# Patient Record
Sex: FEMALE | Race: WHITE | NOT HISPANIC OR LATINO | Employment: FULL TIME | URBAN - METROPOLITAN AREA
[De-identification: names, ages, dates, MRNs, and addresses within clinical notes are randomized per-mention and may not be internally consistent; named-entity substitution may affect disease eponyms.]

---

## 2019-06-18 ENCOUNTER — OFFICE VISIT (OUTPATIENT)
Dept: OBGYN CLINIC | Facility: MEDICAL CENTER | Age: 59
End: 2019-06-18
Payer: COMMERCIAL

## 2019-06-18 ENCOUNTER — TELEPHONE (OUTPATIENT)
Dept: OBGYN CLINIC | Facility: MEDICAL CENTER | Age: 59
End: 2019-06-18

## 2019-06-18 VITALS
SYSTOLIC BLOOD PRESSURE: 112 MMHG | HEIGHT: 62 IN | BODY MASS INDEX: 25.83 KG/M2 | WEIGHT: 140.38 LBS | DIASTOLIC BLOOD PRESSURE: 72 MMHG

## 2019-06-18 DIAGNOSIS — N95.0 POSTMENOPAUSAL BLEEDING: Primary | ICD-10-CM

## 2019-06-18 PROCEDURE — 58100 BIOPSY OF UTERUS LINING: CPT | Performed by: PHYSICIAN ASSISTANT

## 2019-06-18 PROCEDURE — 99202 OFFICE O/P NEW SF 15 MIN: CPT | Performed by: PHYSICIAN ASSISTANT

## 2019-06-18 RX ORDER — ATENOLOL 25 MG/1
25 TABLET ORAL DAILY
Refills: 1 | COMMUNITY
Start: 2019-05-01 | End: 2022-05-24 | Stop reason: SDUPTHER

## 2019-06-18 RX ORDER — MISOPROSTOL 200 UG/1
TABLET ORAL
Qty: 4 TABLET | Refills: 0 | Status: SHIPPED | OUTPATIENT
Start: 2019-06-18 | End: 2022-05-24

## 2019-06-18 RX ORDER — CITALOPRAM 10 MG/1
10 TABLET ORAL DAILY
Refills: 0 | COMMUNITY
Start: 2019-03-13 | End: 2022-05-24 | Stop reason: SDUPTHER

## 2019-06-18 RX ORDER — LORATADINE 10 MG/1
10 TABLET ORAL DAILY
COMMUNITY

## 2019-06-24 ENCOUNTER — TELEPHONE (OUTPATIENT)
Dept: OBGYN CLINIC | Facility: CLINIC | Age: 59
End: 2019-06-24

## 2019-06-24 DIAGNOSIS — N95.0 POSTMENOPAUSAL BLEEDING: ICD-10-CM

## 2019-06-24 RX ORDER — MISOPROSTOL 200 UG/1
TABLET ORAL
Qty: 4 TABLET | Refills: 0 | Status: CANCELLED | OUTPATIENT
Start: 2019-06-24

## 2019-06-26 ENCOUNTER — HOSPITAL ENCOUNTER (OUTPATIENT)
Dept: RADIOLOGY | Facility: MEDICAL CENTER | Age: 59
Discharge: HOME | End: 2019-06-26
Payer: COMMERCIAL

## 2019-06-26 ENCOUNTER — PROCEDURE VISIT (OUTPATIENT)
Dept: OBGYN CLINIC | Facility: CLINIC | Age: 59
End: 2019-06-26
Payer: COMMERCIAL

## 2019-06-26 VITALS — WEIGHT: 140.4 LBS | BODY MASS INDEX: 25.68 KG/M2 | DIASTOLIC BLOOD PRESSURE: 82 MMHG | SYSTOLIC BLOOD PRESSURE: 118 MMHG

## 2019-06-26 DIAGNOSIS — N95.0 POSTMENOPAUSAL BLEEDING: Primary | ICD-10-CM

## 2019-06-26 DIAGNOSIS — N95.0 POSTMENOPAUSAL BLEEDING: ICD-10-CM

## 2019-06-26 PROCEDURE — 76856 US EXAM PELVIC COMPLETE: CPT

## 2019-06-26 PROCEDURE — 58100 BIOPSY OF UTERUS LINING: CPT | Performed by: PHYSICIAN ASSISTANT

## 2019-06-26 PROCEDURE — 76830 TRANSVAGINAL US NON-OB: CPT

## 2019-06-28 ENCOUNTER — TELEPHONE (OUTPATIENT)
Dept: OBGYN CLINIC | Facility: CLINIC | Age: 59
End: 2019-06-28

## 2019-06-28 LAB
CLINICAL INFO: NORMAL
PATH REPORT.COMMENTS IMP SPEC: NORMAL
PATH REPORT.FINAL DX SPEC: NORMAL
PROCEDURE TYPE: NORMAL
SPECIMEN SOURCE: NORMAL

## 2019-06-28 NOTE — TELEPHONE ENCOUNTER
Dear Gilberto Albert:    Pt called office today to request recent pelvic ultrasound result (pelvic ultrasound completed on 6/26/19)  Pt informed that medical staff will notify Pt of said result upon receipt and review of result by provider  Please advise  Thank you!     Katie Loya MA

## 2019-06-28 NOTE — TELEPHONE ENCOUNTER
Attempted to leave voicemail message today, however, unable to leave message due to Pt's answering service stating Pt's  voicemail mailbox is full at this time

## 2019-07-03 ENCOUNTER — OFFICE VISIT (OUTPATIENT)
Dept: DERMATOLOGY | Facility: CLINIC | Age: 59
End: 2019-07-03
Payer: COMMERCIAL

## 2019-07-03 DIAGNOSIS — L81.9 DYSCHROMIA: Primary | ICD-10-CM

## 2019-07-03 DIAGNOSIS — Z13.89 SCREENING FOR SKIN CONDITION: ICD-10-CM

## 2019-07-03 DIAGNOSIS — D22.9 NEVUS: ICD-10-CM

## 2019-07-03 PROCEDURE — 99203 OFFICE O/P NEW LOW 30 MIN: CPT | Performed by: DERMATOLOGY

## 2019-07-03 NOTE — PROGRESS NOTES
[address]  [address] [facility] [address]  [phone]  [phone]     MRN: [id number] : 1960  Encounter: [id number]  Patient Information: Kamran Askew  Chief complaint:  Skin cancer checkup    History of present illness:  70-year-old female who likes to tan presents for overall skin check for concerns regarding skin cancer patient cyst as has squamous cell cancer no specific concerns nose except some darkened area on the chest and face  Past Medical History:   Diagnosis Date    Anxiety     Arthritis     Fibroids     GERD (gastroesophageal reflux disease)     Heart disease     Varicella      Past Surgical History:   Procedure Laterality Date    CARDIAC CATHETERIZATION      DILATION AND EVACUATION      TONSILECTOMY AND ADNOIDECTOMY      TUBAL LIGATION       Social History   Social History     Substance and Sexual Activity   Alcohol Use Yes    Frequency: Monthly or less    Comment: socially      Social History     Substance and Sexual Activity   Drug Use Never     Social History     Tobacco Use   Smoking Status Never Smoker   Smokeless Tobacco Never Used     Family History   Problem Relation Age of Onset    Breast cancer Family     Colon cancer Family     Breast cancer Mother     Colon cancer Father     Ovarian cancer Neg Hx      Meds/Allergies   No Known Allergies    Meds:  Prior to Admission medications    Medication Sig Start Date End Date Taking?  Authorizing Provider   atenolol (TENORMIN) 25 mg tablet Take 25 mg by mouth daily 19  Yes Historical Provider, MD   citalopram (CeleXA) 10 mg tablet Take 10 mg by mouth daily 3/13/19  Yes Historical Provider, MD   loratadine (CLARITIN) 10 mg tablet Take 10 mg by mouth daily   Yes Historical Provider, MD   misoprostol (CYTOTEC) 200 mcg tablet 2 tabs VAGINALLY evening prior to procedure, 2 tabs VAGINALLY morning of procedure  Patient not taking: Reported on 7/3/2019 6/18/19 Chuy Blackwood PA-C       Subjective:     Review of Systems:    General: negative for - chills, fatigue, fever,  weight gain or weight loss  Psychological: negative for - anxiety, behavioral disorder, concentration difficulties, decreased libido, depression, irritability, memory difficulties, mood swings, sleep disturbances or suicidal ideation  ENT: negative for - hearing difficulties , nasal congestion, nasal discharge, oral lesions, sinus pain, sneezing, sore throat  Allergy and Immunology: negative for - hives, insect bite sensitivity,  Hematological and Lymphatic: negative for - bleeding problems, blood clots,bruising, swollen lymph nodes  Endocrine: negative for - hair pattern changes, hot flashes, malaise/lethargy, mood swings, palpitations, polydipsia/polyuria, skin changes, temperature intolerance or unexpected weight change  Respiratory: negative for - cough, hemoptysis, orthopnea, shortness of breath, or wheezing  Cardiovascular: negative for - chest pain, dyspnea on exertion, edema,  Gastrointestinal: negative for - abdominal pain, nausea/vomiting  Genito-Urinary: negative for - dysuria, incontinence, irregular/heavy menses or urinary frequency/urgency  Musculoskeletal: negative for - gait disturbance, joint pain, joint stiffness, joint swelling, muscle pain, muscular weakness  Dermatological:  As in HPI  Neurological: negative for confusion, dizziness, headaches, impaired coordination/balance, memory loss, numbness/tingling, seizures, speech problems, tremors or weakness       Objective: There were no vitals taken for this visit      Physical Exam:    General Appearance:    Alert, cooperative, no distress   Head:    Normocephalic, without obvious abnormality, atraumatic           Skin:   A full skin exam was performed including scalp, head scalp, eyes, ears, nose, lips, neck, chest, axilla, abdomen, back, buttocks, bilateral upper extremities, bilateral lower extremities, hands, feet, fingers, toes, fingernails, and toenails normal pigmented lesion regular shape and color some hyperpigmentation noted on the chest wall and face nothing else remarkable on exam     Assessment:     1  Dyschromia     2  Screening for skin condition     3  Nevus           Plan:   Dyschromia question melasma results of chronic sun exposure no real problems noted  Nevi reviewed the concept of ABCDE and ugly duckling nothing markedly atypical patient reassured  Screening for Dermatologic Disorders: Nothing else of concern noted on complete exam follow up in 1 year       Liana Gitelman, MD  7/3/2019,8:45 AM    Portions of the record may have been created with voice recognition software   Occasional wrong word or "sound a like" substitutions may have occurred due to the inherent limitations of voice recognition software   Read the chart carefully and recognize, using context, where substitutions have occurred

## 2019-07-03 NOTE — PATIENT INSTRUCTIONS
Dyschromia question melasma results of chronic sun exposure no real problems noted  Nevi reviewed the concept of ABCDE and ugly duckling nothing markedly atypical patient reassured  Screening for Dermatologic Disorders: Nothing else of concern noted on complete exam follow up in 1 year

## 2019-07-05 ENCOUNTER — TELEPHONE (OUTPATIENT)
Dept: OBGYN CLINIC | Facility: CLINIC | Age: 59
End: 2019-07-05

## 2019-07-05 NOTE — TELEPHONE ENCOUNTER
Pt called very upset  Pt states that she received a call from [employer] on Monday 7/1 with u/s and biopsy results  Luz Maria Cook explained that everything was normal  Pt states izabel then called on Wednesday 7/3 and left a message for pt to call back  Pt then called back within 15 minutes and we explained that she is with a patient and she will return her call  Pt never received a call back from [employer] and we were closed yesterday 7/4  Pt is leaving for [place] on Monday and would like a call back asap pt is very worried something is wrong   Pt assumed everything was fine from the call with izabel on Monday 7/1 please advise

## 2019-07-05 NOTE — TELEPHONE ENCOUNTER
Spoke with pt    expl I saw nothing new on her results from what izabel discussed with her  She is insisting on  a call on 07/08 early am to confirm from [facility]     Leaving for [place]

## 2019-07-08 NOTE — TELEPHONE ENCOUNTER
Reviewed results with patient, tissue was normal but scant and paired with US, rec consult with physician and possible D&C patient aware and scheduling appointment  Did not seem upset

## 2019-08-15 ENCOUNTER — OFFICE VISIT (OUTPATIENT)
Dept: OBGYN CLINIC | Facility: MEDICAL CENTER | Age: 59
End: 2019-08-15
Payer: COMMERCIAL

## 2019-08-15 VITALS
WEIGHT: 141.8 LBS | SYSTOLIC BLOOD PRESSURE: 118 MMHG | DIASTOLIC BLOOD PRESSURE: 78 MMHG | RESPIRATION RATE: 14 BRPM | BODY MASS INDEX: 26.09 KG/M2 | HEIGHT: 62 IN

## 2019-08-15 DIAGNOSIS — N95.0 PMB (POSTMENOPAUSAL BLEEDING): Primary | ICD-10-CM

## 2019-08-15 DIAGNOSIS — Z01.419 ENCOUNTER FOR ANNUAL ROUTINE GYNECOLOGICAL EXAMINATION: ICD-10-CM

## 2019-08-15 PROCEDURE — 87624 HPV HI-RISK TYP POOLED RSLT: CPT | Performed by: OBSTETRICS & GYNECOLOGY

## 2019-08-15 PROCEDURE — G0145 SCR C/V CYTO,THINLAYER,RESCR: HCPCS | Performed by: OBSTETRICS & GYNECOLOGY

## 2019-08-15 PROCEDURE — 99214 OFFICE O/P EST MOD 30 MIN: CPT | Performed by: OBSTETRICS & GYNECOLOGY

## 2019-08-15 RX ORDER — ALPRAZOLAM 0.25 MG/1
0.25 TABLET ORAL DAILY PRN
Refills: 0 | COMMUNITY
Start: 2019-07-03

## 2019-08-15 NOTE — PROGRESS NOTES
Assessment/Plan:      Diagnoses and all orders for this visit:    PMB (postmenopausal bleeding)    Encounter for annual routine gynecological examination  -     Liquid-based pap, screening    Other orders  -     ALPRAZolam (XANAX) 0 25 mg tablet; Take 0 25 mg by mouth daily as needed        We had a long discussion regarding the ability to properly evaluate the uterus after ablation  Postmenopausal bleeding often results and recommendation for hysterectomy in the setting of endometrial ablation  On the side of being benign was the fact that she had minimal bleeding for 1 day and her endometrium was properly evaluated before the ablation with an endometrial biopsy and D&C; both specimens showed no Hyperplasia or atypia  The biopsy did show disordered proliferative endometrium (Per Dr Garg Beverage note, I was not able to view a copy path report) This may be interpreted as simple hyperplasia without atypia  The rate of progression to endometrial cancer would be expected to be low  We discussed observation, D&C hysteroscopy, hysterectomy  I recommended D&C hysteroscopy despite its limited diagnostic value  I offered a 2nd opinion from GYN/ONC  I spent greater than 30 minutes with the patient greater than 50% of the time was spent counseling and coordinating care  Subjective:     Patient ID: Braxton Hu is a 62 y o  female  Patient is a 80-year-old female, para 2, endometrial ablation 4 years ago, presents for consultation for D&C  Patient had 1 day of very light pink spotting in June of 2019  Patient has had no postmenopausal bleeding since her ablation 4 years ago  Prior to her ablation she had an endometrial biopsy and a hysteroscopy D&C  The pathology report from her D&C in 2015 is available on Care everywhere and it shows proliferative endometrium with no evidence of hyperplasia or atypia  Patient denies further bleeding since that one day of spotting in June     Patient denies pelvic or abdominal pain    Past medical history significant for anxiety irregular heartbeat and mitral valve prolapse  Current medications are citalopram and to [facility] Nazario  Past surgical history significant for tubal ligation cardiac catheterization and T&A  No known drug allergies        Review of Systems      Objective:     Physical Exam

## 2019-08-17 LAB
HPV HR 12 DNA CVX QL NAA+PROBE: NEGATIVE
HPV16 DNA CVX QL NAA+PROBE: NEGATIVE
HPV18 DNA CVX QL NAA+PROBE: NEGATIVE

## 2019-08-21 LAB
LAB AP GYN PRIMARY INTERPRETATION: NORMAL
Lab: NORMAL

## 2020-01-02 ENCOUNTER — OFFICE VISIT (OUTPATIENT)
Dept: URGENT CARE | Facility: MEDICAL CENTER | Age: 60
End: 2020-01-02
Payer: COMMERCIAL

## 2020-01-02 VITALS
TEMPERATURE: 97.6 F | OXYGEN SATURATION: 98 % | RESPIRATION RATE: 20 BRPM | HEART RATE: 52 BPM | DIASTOLIC BLOOD PRESSURE: 88 MMHG | SYSTOLIC BLOOD PRESSURE: 157 MMHG

## 2020-01-02 DIAGNOSIS — H65.02 ACUTE SEROUS OTITIS MEDIA OF LEFT EAR, RECURRENCE NOT SPECIFIED: Primary | ICD-10-CM

## 2020-01-02 PROCEDURE — G0382 LEV 3 HOSP TYPE B ED VISIT: HCPCS | Performed by: PHYSICIAN ASSISTANT

## 2020-01-02 PROCEDURE — S9083 URGENT CARE CENTER GLOBAL: HCPCS | Performed by: PHYSICIAN ASSISTANT

## 2020-01-02 RX ORDER — AMOXICILLIN 500 MG/1
500 TABLET, FILM COATED ORAL 3 TIMES DAILY
Qty: 30 TABLET | Refills: 0 | Status: SHIPPED | OUTPATIENT
Start: 2020-01-02 | End: 2020-01-12

## 2020-01-02 NOTE — PATIENT INSTRUCTIONS
1  Take Amox 500mg 3x daily x 10 days  2  Motrin as needed for ear pain  3   Follow up with PCP in 3-5 days if symptoms persist

## 2020-01-02 NOTE — PROGRESS NOTES
[facility]        NAME: Lucero Miranda is a 61 y o  female  : 1960    MRN: [id number]  DATE: 2020  TIME: 1:20 PM    Assessment and Plan   Acute serous otitis media of left ear, recurrence not specified [H65 02]  1  Acute serous otitis media of left ear, recurrence not specified  amoxicillin (AMOXIL) 500 MG tablet         Patient Instructions     1  Take Amox 500mg 3x daily x 10 days  2  Motrin as needed for ear pain  3  Follow up with PCP in 3-5 days if symptoms persist       Chief Complaint     Chief Complaint   Patient presents with    Earache     x1 week feels movement in left ear, had veritgo a few days ago   feels pain in jaw on left side  History of Present Illness       Bobo Handler is a 49-year-old female presents with the increasing left-sided ear pain that developed over the past 2 days  Patient reports sensation of Tonna Aly in her ear over the last 6 days, she reports increasing left ear and jaw pain that developed over the past 24 hours  Patient denies any new fever or ear drainage  Review of Systems   Review of Systems   Constitutional: Negative  HENT: Positive for ear pain  Negative for ear discharge  Respiratory: Negative            Current Medications       Current Outpatient Medications:     ALPRAZolam (XANAX) 0 25 mg tablet, Take 0 25 mg by mouth daily as needed, Disp: , Rfl: 0    atenolol (TENORMIN) 25 mg tablet, Take 25 mg by mouth daily, Disp: , Rfl: 1    citalopram (CeleXA) 10 mg tablet, Take 10 mg by mouth daily, Disp: , Rfl: 0    loratadine (CLARITIN) 10 mg tablet, Take 10 mg by mouth daily, Disp: , Rfl:     misoprostol (CYTOTEC) 200 mcg tablet, 2 tabs VAGINALLY evening prior to procedure, 2 tabs VAGINALLY morning of procedure, Disp: 4 tablet, Rfl: 0    amoxicillin (AMOXIL) 500 MG tablet, Take 1 tablet (500 mg total) by mouth 3 (three) times a day for 10 days, Disp: 30 tablet, Rfl: 0    Current Allergies     Allergies as of 2020    (No Known Allergies)            The following portions of the patient's history were reviewed and updated as appropriate: allergies, current medications, past family history, past medical history, past social history, past surgical history and problem list      Past Medical History:   Diagnosis Date    Anxiety     Arthritis     Fibroids     GERD (gastroesophageal reflux disease)     Heart disease     Varicella        Past Surgical History:   Procedure Laterality Date    CARDIAC CATHETERIZATION      DILATION AND EVACUATION      TONSILECTOMY AND ADNOIDECTOMY      TUBAL LIGATION         Family History   Problem Relation Age of Onset    Breast cancer Family     Colon cancer Family     Breast cancer Mother     Colon cancer Father     Ovarian cancer Neg Hx          Medications have been verified  Objective   /88   Pulse (!) 52   Temp 97 6 °F (36 4 °C)   Resp 20   SpO2 98%        Physical Exam     Physical Exam   Constitutional: She appears well-developed and well-nourished  No distress  HENT:   Head: Normocephalic and atraumatic  Right Ear: Tympanic membrane and ear canal normal    Left Ear: Tympanic membrane is injected  A middle ear effusion is present  Nose: Nose normal    Mouth/Throat: Uvula is midline, oropharynx is clear and moist and mucous membranes are normal    Cardiovascular: Normal rate, regular rhythm and normal heart sounds  No murmur heard  Pulmonary/Chest: Effort normal and breath sounds normal    Lymphadenopathy:        Head (left side): Submandibular adenopathy present

## 2021-04-16 ENCOUNTER — OFFICE VISIT (OUTPATIENT)
Dept: OBGYN CLINIC | Facility: CLINIC | Age: 61
End: 2021-04-16
Payer: COMMERCIAL

## 2021-04-16 VITALS — WEIGHT: 146.2 LBS | BODY MASS INDEX: 26.74 KG/M2 | DIASTOLIC BLOOD PRESSURE: 80 MMHG | SYSTOLIC BLOOD PRESSURE: 124 MMHG

## 2021-04-16 DIAGNOSIS — Z01.419 ENCOUNTER FOR GYNECOLOGICAL EXAMINATION: ICD-10-CM

## 2021-04-16 DIAGNOSIS — N93.0 POSTCOITAL BLEEDING: Primary | ICD-10-CM

## 2021-04-16 PROCEDURE — 87624 HPV HI-RISK TYP POOLED RSLT: CPT | Performed by: OBSTETRICS & GYNECOLOGY

## 2021-04-16 PROCEDURE — G0145 SCR C/V CYTO,THINLAYER,RESCR: HCPCS | Performed by: OBSTETRICS & GYNECOLOGY

## 2021-04-16 PROCEDURE — 99213 OFFICE O/P EST LOW 20 MIN: CPT | Performed by: OBSTETRICS & GYNECOLOGY

## 2021-04-16 NOTE — PROGRESS NOTES
Assessment/Plan:    Postcoital bleeding  Pap collected  Ultrasound ordered  Exam benign  If lining thickened will need D&C  Has attempt at office biopsy a few years ago and this was painful  Would prefer OR if this is needed  Vulvovaginal atrophy noted - has not needed lubricant but atrophy may be cause of bleeding  Diagnoses and all orders for this visit:    Postcoital bleeding  -     US pelvis complete w transvaginal; Future  -     UA (URINE) with reflex to Scope    Encounter for gynecological examination  -     Liquid-based pap, screening          Subjective:      Patient ID: Anna Cronin is a 61 y o  female  Patient presents for a problem visit  Post coital bleeding  Stated it has only happened twice ( a month ago and this past weekend) and only when wiping  Light pinkish in color  Did not need a pad or panty liner  Also stated she has an odor  61year old G3P 2 0 1 2 with new onset postcoital bleeding  Two episodes noted  Both following intercourse  Not painful  Light pink spotting, quickly resolved  No discharge  No dyspareunia  No lubricant use  Partner x 5 years just got  8 months ago  Gynecologic Exam  The patient's primary symptoms include vaginal bleeding  The patient's pertinent negatives include no genital itching, genital lesions, genital odor, genital rash, pelvic pain or vaginal discharge  This is a new problem  The current episode started 1 to 4 weeks ago  The problem occurs rarely  The problem has been resolved  The patient is experiencing no pain  Pertinent negatives include no chills, constipation, diarrhea, dysuria, fever, frequency, nausea, painful intercourse, sore throat, urgency or vomiting  The vaginal bleeding is spotting  She has not been passing clots  She has not been passing tissue  The symptoms are aggravated by intercourse  She has tried nothing for the symptoms  She is sexually active  She uses nothing for contraception   She is postmenopausal  The following portions of the patient's history were reviewed and updated as appropriate:   She  has a past medical history of Anxiety, Arthritis, Fibroids, GERD (gastroesophageal reflux disease), Heart disease, and Varicella  She   Patient Active Problem List    Diagnosis Date Noted    Postcoital bleeding 04/16/2021    Postmenopausal bleeding 06/18/2019    Uterine leiomyoma 11/10/2015    Mitral valve prolapse 02/26/2015    SI (sacroiliac) pain 02/26/2015     She  has a past surgical history that includes TONSILECTOMY AND ADNOIDECTOMY; Tubal ligation; Cardiac catheterization; and Dilation and evacuation  Her family history includes Breast cancer in her family and mother; Colon cancer in her family and father  She  reports that she has never smoked  She has never used smokeless tobacco  She reports current alcohol use  She reports that she does not use drugs  Current Outpatient Medications   Medication Sig Dispense Refill    ALPRAZolam (XANAX) 0 25 mg tablet Take 0 25 mg by mouth daily as needed  0    atenolol (TENORMIN) 25 mg tablet Take 25 mg by mouth daily  1    citalopram (CeleXA) 10 mg tablet Take 10 mg by mouth daily  0    loratadine (CLARITIN) 10 mg tablet Take 10 mg by mouth daily      misoprostol (CYTOTEC) 200 mcg tablet 2 tabs VAGINALLY evening prior to procedure, 2 tabs VAGINALLY morning of procedure 4 tablet 0     No current facility-administered medications for this visit        Current Outpatient Medications on File Prior to Visit   Medication Sig    ALPRAZolam (XANAX) 0 25 mg tablet Take 0 25 mg by mouth daily as needed    atenolol (TENORMIN) 25 mg tablet Take 25 mg by mouth daily    citalopram (CeleXA) 10 mg tablet Take 10 mg by mouth daily    loratadine (CLARITIN) 10 mg tablet Take 10 mg by mouth daily    misoprostol (CYTOTEC) 200 mcg tablet 2 tabs VAGINALLY evening prior to procedure, 2 tabs VAGINALLY morning of procedure     No current facility-administered medications on file prior to visit  She has No Known Allergies       Review of Systems   Constitutional: Negative for activity change, appetite change, chills, fatigue and fever  HENT: Negative for rhinorrhea, sneezing and sore throat  Eyes: Negative for visual disturbance  Respiratory: Negative for cough, shortness of breath and wheezing  Cardiovascular: Negative for chest pain, palpitations and leg swelling  Gastrointestinal: Negative for abdominal distention, constipation, diarrhea, nausea and vomiting  Genitourinary: Negative for difficulty urinating, dysuria, frequency, pelvic pain, urgency and vaginal discharge  Neurological: Negative for syncope and light-headedness  Objective:      /80   Wt 66 3 kg (146 lb 3 2 oz)   BMI 26 74 kg/m²          Physical Exam  Constitutional:       General: She is not in acute distress  Appearance: She is well-developed  She is not diaphoretic  Abdominal:      General: Bowel sounds are normal  There is no distension  Palpations: Abdomen is soft  There is no mass  Tenderness: There is no abdominal tenderness  There is no guarding or rebound  Genitourinary:     Labia:         Right: No rash, tenderness, lesion or injury  Left: No rash, tenderness, lesion or injury  Vagina: No vaginal discharge or bleeding  Cervix: No cervical motion tenderness, discharge or friability  Uterus: Not deviated, not enlarged, not fixed and not tender  Adnexa:         Right: No mass, tenderness or fullness  Left: No mass, tenderness or fullness  Comments: Urethral meatus- no lesions, non tender  Urethra non tender  Bladder non tender  Thin, atrophic vaginal mucosa    Skin:     General: Skin is warm and dry  Coloration: Skin is not pale  Findings: No erythema or rash

## 2021-04-16 NOTE — PATIENT INSTRUCTIONS
Postmenopausal Bleeding   WHAT YOU NEED TO KNOW:   What do I need to know about postmenopausal bleeding? Postmenopausal bleeding is bleeding that occurs after menopause  A woman who has not had a period for a full year after the age of 36 is considered to be in menopause  Postmenopausal bleeding may range from spotting to very heavy bleeding  What causes postmenopausal bleeding? · Thinning of the endometrium (lining of the uterus) called endometrial atrophy    · Polyps (noncancerous growths) that develop on the inner wall of your uterus or cervix    · Hormone replacement therapy    · Abnormal thickening of the endometrium called endometrial hyperplasia    · Tamoxifen (medicine used to treat breast cancer)    · Cervical, endometrial, or uterine cancer    How is postmenopausal bleeding diagnosed? Your healthcare provider will ask about medical conditions that you have, and that run in your family  He will also do a pelvic exam to check for problems with your cervix, uterus, and ovaries  You may also need any of the following:  · An ultrasound  uses sound waves to show pictures of your uterus on a monitor  Your healthcare provider may place saline fluid into your uterus with a catheter  The fluid helps show more detail in the ultrasound pictures of your uterus  · Endometrial biopsy  is used to collect a sample of cells from the inside of your uterus to be tested for cancer  · Hysteroscopy  is a procedure that is done to look inside your uterus  A small scope with a light and camera is placed into your vagina and cervix  Liquid or gas may be put through the scope to help healthcare providers see better  · Dilation and curettage (D&C)  is a procedure to remove tissue from the lining of your uterus  The tissue is sent to a lab for tests  How is postmenopausal bleeding treated? Treatment depends on the cause of your postmenopausal bleeding  If you have polyps, you may need surgery to remove them  Endometrial atrophy can be treated with medicines  Endometrial hyperplasia may be treated with progestin hormone therapy  Surgery to remove your uterus will be needed if you have endometrial or uterine cancer  Your fallopian tubes, ovaries, and nearby lymph nodes may also be removed  When should I contact my healthcare provider? · You continue to have vaginal bleeding, even with treatment  · You have pain in your abdomen or pelvis  · You have questions or concerns about your condition or care  CARE AGREEMENT:   You have the right to help plan your care  Learn about your health condition and how it may be treated  Discuss treatment options with your healthcare providers to decide what care you want to receive  You always have the right to refuse treatment  The above information is an  only  It is not intended as medical advice for individual conditions or treatments  Talk to your doctor, nurse or pharmacist before following any medical regimen to see if it is safe and effective for you  © Copyright [address] Information is for End User's use only and may not be sold, redistributed or otherwise used for commercial purposes   All illustrations and images included in CareNotes® are the copyrighted property of [employer]  or [address] [place] [address]

## 2021-04-16 NOTE — ASSESSMENT & PLAN NOTE
Pap collected  Ultrasound ordered  Exam benign  If lining thickened will need D&C  Has attempt at office biopsy a few years ago and this was painful  Would prefer OR if this is needed  Vulvovaginal atrophy noted - has not needed lubricant but atrophy may be cause of bleeding

## 2021-04-21 LAB
LAB AP GYN PRIMARY INTERPRETATION: NORMAL
Lab: NORMAL
PATH INTERP SPEC-IMP: NORMAL

## 2021-04-30 ENCOUNTER — HOSPITAL ENCOUNTER (OUTPATIENT)
Dept: RADIOLOGY | Facility: MEDICAL CENTER | Age: 61
Discharge: HOME | End: 2021-04-30
Payer: COMMERCIAL

## 2021-04-30 DIAGNOSIS — N93.0 POSTCOITAL BLEEDING: ICD-10-CM

## 2021-04-30 PROCEDURE — 76856 US EXAM PELVIC COMPLETE: CPT

## 2021-04-30 PROCEDURE — 76830 TRANSVAGINAL US NON-OB: CPT

## 2021-06-04 ENCOUNTER — APPOINTMENT (OUTPATIENT)
Dept: RADIOLOGY | Facility: MEDICAL CENTER | Age: 61
End: 2021-06-04
Payer: COMMERCIAL

## 2021-06-04 ENCOUNTER — OFFICE VISIT (OUTPATIENT)
Dept: URGENT CARE | Facility: MEDICAL CENTER | Age: 61
End: 2021-06-04
Payer: COMMERCIAL

## 2021-06-04 VITALS
SYSTOLIC BLOOD PRESSURE: 143 MMHG | HEIGHT: 62 IN | BODY MASS INDEX: 25.76 KG/M2 | OXYGEN SATURATION: 98 % | HEART RATE: 57 BPM | DIASTOLIC BLOOD PRESSURE: 70 MMHG | TEMPERATURE: 97.6 F | WEIGHT: 140 LBS | RESPIRATION RATE: 18 BRPM

## 2021-06-04 DIAGNOSIS — M79.641 RIGHT HAND PAIN: ICD-10-CM

## 2021-06-04 DIAGNOSIS — M65.4 DE QUERVAIN'S TENOSYNOVITIS, RIGHT: Primary | ICD-10-CM

## 2021-06-04 PROCEDURE — S9083 URGENT CARE CENTER GLOBAL: HCPCS | Performed by: PHYSICIAN ASSISTANT

## 2021-06-04 PROCEDURE — 73130 X-RAY EXAM OF HAND: CPT

## 2021-06-04 PROCEDURE — G0382 LEV 3 HOSP TYPE B ED VISIT: HCPCS | Performed by: PHYSICIAN ASSISTANT

## 2021-06-04 NOTE — PATIENT INSTRUCTIONS
Tenosynovitis   WHAT YOU NEED TO KNOW:   Tenosynovitis is inflammation of a tendon and its synovium  Tendons are cords of tissue that connect muscles to bones  The synovium is the lining of the sheath around the tendon  The tendons may become thickened and not slide smoothly through the swollen lining  The cause of tenosynovitis is not known  DISCHARGE INSTRUCTIONS:   Medicines:   · Pain medicines  may be given  Ask how to take this medicine safely  · NSAIDs , such as ibuprofen, help decrease swelling, pain, and fever  This medicine is available with or without a doctor's order  NSAIDs can cause stomach bleeding or kidney problems in certain people  If you take blood thinner medicine, always ask your healthcare provider if NSAIDs are safe for you  Always read the medicine label and follow directions  · Antibiotics  help treat a bacterial infection  · Antifungals  help treat a fungal infection  · Take your medicine as directed  Contact your healthcare provider if you think your medicine is not helping or if you have side effects  Tell him of her if you are allergic to any medicine  Keep a list of the medicines, vitamins, and herbs you take  Include the amounts, and when and why you take them  Bring the list or the pill bottles to follow-up visits  Carry your medicine list with you in case of an emergency  Follow up with your healthcare provider as directed:  Write down your questions so you remember to ask them during your visits  Self-care:   · Rest  your inflamed area as directed  · Apply ice  on your inflamed area for 15 to 20 minutes every hour or as directed  Use an ice pack, or put crushed ice in a plastic bag  Cover it with a towel  Ice helps prevent tissue damage and decreases swelling and pain  · Elevate  your inflamed area above the level of your heart as often as you can  This will help decrease swelling and pain   Prop your inflamed area on pillows or blankets to keep it elevated comfortably  · Use your assistive device as directed  You may need a brace, splint, walking boot, or cast  You may also need to use crutches or orthotics  These devices prevent movement, decrease pain, and help your tendon heal          · Go to physical or occupational therapy  A physical therapist teaches you exercises to help improve movement and strength and decrease pain  An occupational therapist teaches you skills to help with your daily activities  Contact your healthcare provider if:   · You have a fever  · A joint near your inflamed area is red and swollen  · Your symptoms do not go away or they get worse, even after treatment  · You have questions or concerns about your condition or care  Return to the emergency department if:   · You have sudden trouble breathing or chest pain  · Your arm, leg, fingers, or toes are numb, tingle, or are pale  · You hear or feel a pop  · You have severe pain  © Copyright [address] Information is for End User's use only and may not be sold, redistributed or otherwise used for commercial purposes  All illustrations and images included in CareNotes® are the copyrighted property of A D A M , Inc  or [facility] Abbi Pérez   The above information is an  only  It is not intended as medical advice for individual conditions or treatments  Talk to your doctor, nurse or pharmacist before following any medical regimen to see if it is safe and effective for you

## 2021-06-04 NOTE — PROGRESS NOTES
[facility]        NAME: Ross Morales is a 61 y o  female  : 1960    MRN: [id number]  DATE: 2021  TIME: 8:20 AM    Assessment and Plan   De Quervain's tenosynovitis, right [M65 4]  1  De Quervain's tenosynovitis, right  XR hand 3+ vw right       X-ray shows no acute abnormality  Symptoms consistent with de Quervain  Recommended thumb spica brace and continued icing and NSAIDs  If no improvement follow-up with orthopedics  Patient Instructions     Continue Advil   ice the thumb   use brace during the day   follow-up with orthopedics if symptoms continue  Follow up with PCP in 3-5 days  Proceed to  ER if symptoms worsen  Chief Complaint     Chief Complaint   Patient presents with    Hand Pain     Started week ago with right hand pain, at times the thumb will tingle  Has been taking Advil  History of Present Illness       Patient is a 54-year-old female who comes in today with right thumb pain and swelling for the past week  She reports approximately 2 weeks ago she was carrying luggage in [facility] hts the  Thumb while she was doing so  She reports mild swelling started recently  Has been taking 1-2 Advil daily without much relief  She just bought a thumb spica brace that she started wearing as well  Review of Systems   Review of Systems   Constitutional: Negative for fever  Respiratory: Negative for shortness of breath  Cardiovascular: Negative for chest pain  Musculoskeletal: Positive for arthralgias and joint swelling  Skin: Negative for color change  Neurological: Positive for numbness           Current Medications       Current Outpatient Medications:     ALPRAZolam (XANAX) 0 25 mg tablet, Take 0 25 mg by mouth daily as needed, Disp: , Rfl: 0    atenolol (TENORMIN) 25 mg tablet, Take 25 mg by mouth daily, Disp: , Rfl: 1    citalopram (CeleXA) 10 mg tablet, Take 10 mg by mouth daily, Disp: , Rfl: 0    loratadine (CLARITIN) 10 mg tablet, Take 10 mg by mouth daily, Disp: , Rfl:     misoprostol (CYTOTEC) 200 mcg tablet, 2 tabs VAGINALLY evening prior to procedure, 2 tabs VAGINALLY morning of procedure (Patient not taking: Reported on 6/4/2021), Disp: 4 tablet, Rfl: 0    Current Allergies     Allergies as of 06/04/2021    (No Known Allergies)            The following portions of the patient's history were reviewed and updated as appropriate: allergies, current medications, past family history, past medical history, past social history, past surgical history and problem list      Past Medical History:   Diagnosis Date    Anxiety     Arthritis     Fibroids     GERD (gastroesophageal reflux disease)     Heart disease     Varicella        Past Surgical History:   Procedure Laterality Date    CARDIAC CATHETERIZATION      DILATION AND EVACUATION      TONSILECTOMY AND ADNOIDECTOMY      TUBAL LIGATION         Family History   Problem Relation Age of Onset    Breast cancer Family     Colon cancer Family     Breast cancer Mother     Colon cancer Father     Ovarian cancer Neg Hx          Medications have been verified  Objective   /70   Pulse 57   Temp 97 6 °F (36 4 °C) (Temporal)   Resp 18   Ht 5' 2" (1 575 m)   Wt 63 5 kg (140 lb)   SpO2 98%   BMI 25 61 kg/m²        Physical Exam     Physical Exam  Constitutional:       General: She is not in acute distress  Appearance: Normal appearance  She is not ill-appearing  Cardiovascular:      Rate and Rhythm: Normal rate and regular rhythm  Pulmonary:      Effort: Pulmonary effort is normal    Musculoskeletal:         General: Swelling and tenderness present  No deformity or signs of injury  Right hand: She exhibits decreased range of motion, tenderness, bony tenderness and swelling  She exhibits normal capillary refill, no deformity and no laceration  Normal sensation noted  Normal strength noted  Hands:    Skin:     General: Skin is warm and dry     Neurological: Mental Status: She is alert

## 2022-05-24 ENCOUNTER — OFFICE VISIT (OUTPATIENT)
Dept: FAMILY MEDICINE CLINIC | Facility: MEDICAL CENTER | Age: 62
End: 2022-05-24
Payer: COMMERCIAL

## 2022-05-24 VITALS
WEIGHT: 147 LBS | SYSTOLIC BLOOD PRESSURE: 106 MMHG | HEART RATE: 64 BPM | RESPIRATION RATE: 16 BRPM | BODY MASS INDEX: 27.05 KG/M2 | HEIGHT: 62 IN | DIASTOLIC BLOOD PRESSURE: 70 MMHG | TEMPERATURE: 97.8 F

## 2022-05-24 DIAGNOSIS — F40.243 FEAR OF FLYING: ICD-10-CM

## 2022-05-24 DIAGNOSIS — Z76.89 ENCOUNTER TO ESTABLISH CARE WITH NEW DOCTOR: Primary | ICD-10-CM

## 2022-05-24 DIAGNOSIS — Z11.4 SCREENING FOR HIV (HUMAN IMMUNODEFICIENCY VIRUS): ICD-10-CM

## 2022-05-24 DIAGNOSIS — M25.541 ARTHRALGIA OF BOTH HANDS: ICD-10-CM

## 2022-05-24 DIAGNOSIS — M25.542 ARTHRALGIA OF BOTH HANDS: ICD-10-CM

## 2022-05-24 DIAGNOSIS — Z79.899 CURRENT USE OF BETA BLOCKER: ICD-10-CM

## 2022-05-24 DIAGNOSIS — F41.9 ANXIETY: ICD-10-CM

## 2022-05-24 DIAGNOSIS — M75.42 SHOULDER IMPINGEMENT, LEFT: ICD-10-CM

## 2022-05-24 DIAGNOSIS — Z13.89 SCREENING FOR NEPHROPATHY: ICD-10-CM

## 2022-05-24 DIAGNOSIS — Z11.59 NEED FOR HEPATITIS C SCREENING TEST: ICD-10-CM

## 2022-05-24 DIAGNOSIS — J30.2 SEASONAL ALLERGIES: ICD-10-CM

## 2022-05-24 DIAGNOSIS — Z13.220 SCREENING CHOLESTEROL LEVEL: ICD-10-CM

## 2022-05-24 PROCEDURE — 3008F BODY MASS INDEX DOCD: CPT | Performed by: STUDENT IN AN ORGANIZED HEALTH CARE EDUCATION/TRAINING PROGRAM

## 2022-05-24 PROCEDURE — 3725F SCREEN DEPRESSION PERFORMED: CPT | Performed by: STUDENT IN AN ORGANIZED HEALTH CARE EDUCATION/TRAINING PROGRAM

## 2022-05-24 PROCEDURE — 99214 OFFICE O/P EST MOD 30 MIN: CPT | Performed by: STUDENT IN AN ORGANIZED HEALTH CARE EDUCATION/TRAINING PROGRAM

## 2022-05-24 PROCEDURE — 1036F TOBACCO NON-USER: CPT | Performed by: STUDENT IN AN ORGANIZED HEALTH CARE EDUCATION/TRAINING PROGRAM

## 2022-05-24 RX ORDER — CITALOPRAM 10 MG/1
10 TABLET ORAL DAILY
Qty: 90 TABLET | Refills: 0 | Status: SHIPPED | OUTPATIENT
Start: 2022-05-24

## 2022-05-24 RX ORDER — ATENOLOL 25 MG/1
25 TABLET ORAL DAILY
Qty: 90 TABLET | Refills: 0 | Status: SHIPPED | OUTPATIENT
Start: 2022-05-24

## 2022-05-24 RX ORDER — METAPROTERENOL SULFATE 10 MG
600 TABLET ORAL DAILY
COMMUNITY

## 2022-05-24 RX ORDER — LANOLIN ALCOHOL/MO/W.PET/CERES
1 CREAM (GRAM) TOPICAL DAILY
COMMUNITY

## 2022-05-24 NOTE — PROGRESS NOTES
[address] - Clinic Note  F[place]ando [place], [place], 22     Jose Anderson MRN: [id number] : 1960 Age: 64 y o  Assessment/Plan     1  Encounter to establish care with new doctor    - presents today to establish care with new provider  - patient will complete lab work per orders  - she will return for annual physical and sooner as needed, immunizations to be addressed  - established with gynecologist for woman's health  - patient states she completed colonoscopy 2021, will obtain records    2  Shoulder impingement, left    - Ambulatory Referral to Physical Therapy; Future  - Ambulatory Referral to Sports Medicine; Future    3  Arthralgia of both hands    - Ambulatory Referral to Sports Medicine; Future  - CBC and differential; Future  - C-reactive protein; Future  - Antinuclear Antibodies (MITA), IFA; Future  - RF Screen w/ Reflex to Titer; Future    4  Anxiety    - citalopram (CeleXA) 10 mg tablet; Take 1 tablet (10 mg total) by mouth in the morning  Dispense: 90 tablet; Refill: 0  - uses Xanax p r n  for fear of flying    5  Current use of beta blocker    - atenolol (TENORMIN) 25 mg tablet; Take 1 tablet (25 mg total) by mouth in the morning  Dispense: 90 tablet; Refill: 0    6  Screening cholesterol level    - Lipid Panel with Direct LDL reflex; Future    7  Screening for nephropathy    - patient does have arthralgias, will check renal function prior to advisement about NSAID use p r n   - Comprehensive metabolic panel; Future    8  Need for hepatitis C screening test    - Hepatitis C Antibody ([facility]); Future    9  Screening for HIV (human immunodeficiency virus)    - HIV 1/2 Antigen/Antibody (4th Generation) w Reflex [facility]; Future    10  Seasonal allergies    - continue loratadine 10 milligram p o  daily    Jose Anderson acknowledged understanding of treatment plan, all questions answered      Subjective      Jose Anderson is a 64 y o  female who presents to establish care   Patient has past medical history including anxiety, fear of flying, history of mitral valve prolapse  The patient does have complaints today of pain at bilateral 5th digit MCP joint  Has been ongoing for the past 4 months, notices related swelling at times "almost like a cyst"  "When swollen, shiny"  No erythema, no warmth  No stiffness of hand joints  Patient also complaining of left shoulder pain ongoing for 2 months  No numbness or tingling radiating down extremity  No neck pain  Patient notes was difficulty extension  Works preparing food at school       The following portions of the patient's history were reviewed and updated as appropriate: allergies, current medications, past family history, past medical history, past social history, past surgical history and problem list      Past Medical History:   Diagnosis Date    Anxiety     Arthritis     Fibroids     GERD (gastroesophageal reflux disease)     Heart disease     Varicella        No Known Allergies    Past Surgical History:   Procedure Laterality Date    CARDIAC CATHETERIZATION      DILATION AND EVACUATION      TONSILECTOMY AND ADNOIDECTOMY      TUBAL LIGATION         Family History   Problem Relation Age of Onset    Breast cancer Family     Colon cancer Family     Breast cancer Mother     Colon cancer Father     Ovarian cancer Neg Hx        Social History     Socioeconomic History    Marital status: /Civil Union     Spouse name: None    Number of children: None    Years of education: None    Highest education level: None   Occupational History    None   Tobacco Use    Smoking status: Former Smoker    Smokeless tobacco: Never Used   Vaping Use    Vaping Use: Never used   Substance and Sexual Activity    Alcohol use: Yes     Comment: socially     Drug use: Never    Sexual activity: Yes     Partners: Male     Birth control/protection: Post-menopausal   Other Topics Concern    None   Social History Narrative    None     Social Determinants of Health     Financial Resource Strain: Not on file   Food Insecurity: Not on file   Transportation Needs: Not on file   Physical Activity: Not on file   Stress: Not on file   Social Connections: Not on file   Intimate Partner Violence: Not on file   Housing Stability: Not on file       Current Outpatient Medications   Medication Sig Dispense Refill    ALPRAZolam (XANAX) 0 25 mg tablet Take 0 25 mg by mouth daily as needed  0    atenolol (TENORMIN) 25 mg tablet Take 25 mg by mouth daily  1    citalopram (CeleXA) 10 mg tablet Take 10 mg by mouth daily  0    glucosamine-chondroitin 500-400 MG tablet Take 1 tablet by mouth in the morning   loratadine (CLARITIN) 10 mg tablet Take 10 mg by mouth daily      Omega-3 Fatty Acids (Omega-3 Fish Oil) 500 MG CAPS Take 600 mg by mouth in the morning       No current facility-administered medications for this visit  Review of Systems     As noted in HPI    Objective      /70 (BP Location: Left arm, Patient Position: Sitting, Cuff Size: Adult)   Pulse 64   Temp 97 8 °F (36 6 °C)   Resp 16   Ht 5' 2" (1 575 m)   Wt 66 7 kg (147 lb)   BMI 26 89 kg/m²     Physical Exam  Vitals reviewed  Constitutional:       General: She is not in acute distress  Appearance: Normal appearance  HENT:      Head: Normocephalic and atraumatic  Right Ear: External ear normal       Left Ear: External ear normal       Nose: Nose normal       Mouth/Throat:      Mouth: Mucous membranes are moist       Pharynx: Oropharynx is clear  Eyes:      Extraocular Movements: Extraocular movements intact  Conjunctiva/sclera: Conjunctivae normal       Pupils: Pupils are equal, round, and reactive to light  Cardiovascular:      Rate and Rhythm: Normal rate and regular rhythm  Pulses: Normal pulses  Pulmonary:      Effort: Pulmonary effort is normal       Breath sounds: Normal breath sounds  Abdominal:      General: Abdomen is flat  Bowel sounds are normal       Palpations: Abdomen is soft  Tenderness: There is no abdominal tenderness  Musculoskeletal:      Right shoulder: No swelling or deformity  Normal range of motion  Normal strength  Left shoulder: No swelling or deformity  Normal range of motion  Normal strength  Right hand: Tenderness (5th MCP) present  Normal strength  Normal sensation  Left hand: Tenderness (5th MCP) present  Normal strength  Normal sensation  Comments: Positive Figueroa sign left shoulder    Negative drop-arm test   Skin:     General: Skin is warm and dry  Neurological:      Mental Status: She is alert and oriented to person, place, and time  Psychiatric:         Mood and Affect: Mood normal          Behavior: Behavior normal          Thought Content: Thought content normal          Judgment: Judgment normal              Some portions of this record may have been generated with voice recognition software  There may be translation, syntax, or grammatical errors  Occasional wrong word or "sound-a-like" substitutions may have occurred due to the inherent limitations of the voice recognition software  Read the chart carefully and recognize, using context, where substations may have occurred  If you have any questions, please contact the dictating provider for clarification or correction, as needed

## 2022-05-25 ENCOUNTER — TELEPHONE (OUTPATIENT)
Dept: ADMINISTRATIVE | Facility: OTHER | Age: 62
End: 2022-05-25

## 2022-05-25 NOTE — TELEPHONE ENCOUNTER
----- Message from Js Eddy sent at 5/24/2022  4:25 PM EDT -----  Regarding: colonoscopy  05/24/22 4:27 PM    Hello, our patient  has had CRC: Colonoscopy completed/performed   Please assist in updating the patient chart by making an External outreach to [facility]  The date of service is November 2021    Thank you,  Js Eddy  [facility]

## 2022-05-25 NOTE — LETTER
Procedure Request Form: Colonoscopy      Date Requested: 22  Patient: Gayla Sabot  Patient : 1960   Referring Provider: Joslyn Longk, DO        Date of Procedure ______________________________       The above patient has informed us that they have completed their   most recent Colonoscopy at your facility  Please complete   this form and attach all corresponding procedure reports/results  Comments __________________________________________________________  ____________________________________________________________________  ____________________________________________________________________  ____________________________________________________________________    Facility Completing Procedure _________________________________________    Form Completed By (print name) _______________________________________      Signature __________________________________________________________      These reports are needed for  compliance  Please fax this completed form and a copy of the procedure report to our office located at [place] [address] as soon as possible to [phone] belkis Mckeon: Phone [phone]    We thank you for your assistance in treating our mutual patient

## 2022-05-26 NOTE — TELEPHONE ENCOUNTER
Upon review of the In Basket request we were able to locate, review, and update the patient chart as requested for CRC: Colonoscopy  Any additional questions or concerns should be emailed to the Practice Liaisons via [email or web address] email, please do not reply via In Basket      Thank you  Lili Kenney

## 2022-05-31 ENCOUNTER — EVALUATION (OUTPATIENT)
Dept: PHYSICAL THERAPY | Facility: MEDICAL CENTER | Age: 62
End: 2022-05-31
Payer: COMMERCIAL

## 2022-05-31 DIAGNOSIS — M75.42 SHOULDER IMPINGEMENT, LEFT: ICD-10-CM

## 2022-05-31 PROCEDURE — 97161 PT EVAL LOW COMPLEX 20 MIN: CPT | Performed by: PHYSICAL THERAPIST

## 2022-05-31 NOTE — PROGRESS NOTES
PT Evaluation     Today's date: 2022  Patient name: Mily Longoria  : 1960  MRN: [id number]  Referring provider: Rebecca Matson DO  Dx:   Encounter Diagnosis     ICD-10-CM    1  Shoulder impingement, left  M75 42 Ambulatory Referral to Physical Therapy       Start Time: 934  Stop Time: 1015  Total time in clinic (min): 41 minutes    Assessment  Assessment details: Pt is a 64 y o female who presents with increased L shoulder pain, decreased L shoulder ROM, decreased L shoulder strength and decreased activity tolerance  These impairments limit the patient from participating in work related activities, decreased tolerance to reaching behind herself and decreased ability to perform ADLs at [facility]  Pt presents with signs and symptoms consistent with referring diagnosis  Pt was educated about impingment syndrome, causes of this and plan for PT moving forward  HEP was performed in clinic with good tolerance and no increase in symptoms  I believe this patient is a good candidate for and will benefit from skilled physical therapy for manual therapy to the L shoulder, L shoulder ROM exercises, L shoulder strengthening exercises, postural re-education and mechanics training to improve symptoms and assist the patient to return to PLOF  Thank you for the opportunity to participate in [place] care      Positive Prognostic Indicators: desire to improve    Negative Prognostic Indicators: chronic pathology  Impairments: abnormal muscle firing, abnormal muscle tone, abnormal or restricted ROM, activity intolerance, impaired physical strength, lacks appropriate home exercise program, pain with function, scapular dyskinesis and poor posture     Symptom irritability: lowUnderstanding of Dx/Px/POC: good   Prognosis: good    Goals  STGs: 4 weeks  1) Pt will have SPR decrease of 2 units at worse  2) pt will have improved L shoulder flexion strength by 1/2 muscle grade  3) pt will have improved foto score of 10 points    LTGs: 8 weeks  1) pt will be independent with HEP by D/C  2) pt will be independent with symptom management by D/C  3) pt will pt will subjectively report improved ability to reach behind her with no more than 2/10 pain in order to demonstrate improved functional ability with improved symptom management by DC  Plan  Patient would benefit from: skilled physical therapy  Planned modality interventions: cryotherapy and thermotherapy: hydrocollator packs  Planned therapy interventions: joint mobilization, manual therapy, neuromuscular re-education, patient education, postural training, strengthening, stretching, therapeutic activities, therapeutic exercise, home exercise program and functional ROM exercises  Frequency: 2x week  Duration in weeks: 12  Plan of Care beginning date: 5/31/2022  Plan of Care expiration date: 8/23/2022  Treatment plan discussed with: patient        Subjective Evaluation    History of Present Illness  Mechanism of injury: DOO: 3 months  RAÚL: insidious       Subjective Comments: pt reports that she has shoulder pain for about 3 months  She reports pain with motion but the worse being reaching backwards  Pt denies pain is not radiating, takes a couple seconds to go away with massage  Denies N&T in the UE  Denies neck pain  Pt is unaware of any injury to the shoulder  Pt describes the pain as sharp  RHD  Pt feels the L shoulder is weaker than the R      Pain   Rest: 0/10   Best: 0/10   Worst: 8-9/10    Relieving Factors: massage, has not tried ice or heat, advil    Exacerbating Factors: reaching backwards, reaching up, lifting    Sleeping: increased difficulty sleeping, not due to shoulder  Will have pain with laying on that side    Home Set-up: putting dishes away on tall shelves  Changing sheets is difficult    ADLs: putting something over head is painful    Work/Hobbies: prepares food high school   Lifting heavy trays is difficult    Previous Treatment: n/a    Goals:  Wants to improve lifting ability  Has plans to paint room over the summer and wants to be able to do this  Objective     Palpation   Left   Hypertonic in the infraspinatus and upper trapezius  Tenderness of the infraspinatus and rhomboids  Right   Hypertonic in the upper trapezius  Tenderness of the rhomboids  Tenderness     Left Shoulder   Tenderness in the [facility] joint and biceps tendon (proximal)  Active Range of Motion   Left Shoulder   Flexion: 131 degrees with pain  Abduction: 110 degrees with pain  External rotation BTH: T5   Internal rotation BTB: T12 with pain    Right Shoulder   Flexion: 174 degrees   Abduction: 155 degrees   External rotation BTH: T6   Internal rotation BTB: T4     Passive Range of Motion   Left Shoulder   Flexion: 165 degrees   Abduction: 165 degrees   External rotation 45°: 90 degrees   Internal rotation 45°: 70 degrees     Additional Passive Range of Motion Details  Pain at end range in all directions  Strength/Myotome Testing     Left Shoulder     Planes of Motion   Flexion: 4+   Abduction: 4+   External rotation at 0°: 4+   Internal rotation at 0°: 4+     Isolated Muscles   Infraspinatus: 3+   Rhomboids: 4     Right Shoulder     Planes of Motion   Flexion: 5   Abduction: 5   External rotation at 0°: 5   Internal rotation at 0°: 5     Isolated Muscles   Infraspinatus: 3+   Rhomboids: 4     Tests     Left Shoulder   Positive empty can, Deric/Aguayo, Neer's and Speed's  Negative drop arm                Precautions: MVP, anxiety      Manuals 5/31            L shoulder PROM  RK                                                   Neuro Re-Ed             scap retract 5"x10            No monies             Band rows             Band ext             Band ER             Band IR                          Ther Ex             pulleys             Wall slides x10            UT stretch 30"x3            Cane flexion             [employer] ER Ther Activity                                       Gait Training                                       Modalities

## 2022-06-02 ENCOUNTER — OFFICE VISIT (OUTPATIENT)
Dept: PHYSICAL THERAPY | Facility: MEDICAL CENTER | Age: 62
End: 2022-06-02
Payer: COMMERCIAL

## 2022-06-02 DIAGNOSIS — M75.42 SHOULDER IMPINGEMENT, LEFT: Primary | ICD-10-CM

## 2022-06-02 PROCEDURE — 97110 THERAPEUTIC EXERCISES: CPT | Performed by: PHYSICAL THERAPIST

## 2022-06-02 PROCEDURE — 97112 NEUROMUSCULAR REEDUCATION: CPT | Performed by: PHYSICAL THERAPIST

## 2022-06-02 PROCEDURE — 97140 MANUAL THERAPY 1/> REGIONS: CPT | Performed by: PHYSICAL THERAPIST

## 2022-06-02 NOTE — PROGRESS NOTES
Daily Note     Today's date: 2022  Patient name: Jesus Infante  : 1960  MRN: [id number]  Referring provider: Eric Jones DO  Dx:   Encounter Diagnosis     ICD-10-CM    1  Shoulder impingement, left  M75 42        Start Time: 929  Stop Time: 1013  Total time in clinic (min): 44 minutes    Subjective: pt reports that she continues to have increased symptoms  Exercises were tolerated well at home  Objective: See treatment diary below      Assessment: Tolerated treatment well  Pt reported increased tightness in the shoulder with ROM exercises that improved with continued repetitions  Gentle postural and RTC strengthening was completed and tolerated well  HEP progressed with additional ROM exercises  We will continue to progress as tolerated  Patient would benefit from continued PT      Plan: Continue per plan of care        Precautions: MVP, anxiety      Manuals            L shoulder PROM  RK RK                                                  Neuro Re-Ed             scap retract 5"x10 5"x10           No monies             Band rows  rtb 2x10 3"           Band ext  rtb 2x10 3"           Band ER  rtb 2x10            Band IR  rtb 2x10                         Ther Ex             pulleys             Wall slides x10            UT stretch 30"x3            Cane flexion  x10           Cane abd  x10           Cane ER  x10                                     Ther Activity                                       Gait Training                                       Modalities

## 2022-06-07 ENCOUNTER — APPOINTMENT (OUTPATIENT)
Dept: LAB | Facility: MEDICAL CENTER | Age: 62
End: 2022-06-07
Payer: COMMERCIAL

## 2022-06-07 ENCOUNTER — OFFICE VISIT (OUTPATIENT)
Dept: PHYSICAL THERAPY | Facility: MEDICAL CENTER | Age: 62
End: 2022-06-07
Payer: COMMERCIAL

## 2022-06-07 DIAGNOSIS — Z11.59 NEED FOR HEPATITIS C SCREENING TEST: ICD-10-CM

## 2022-06-07 DIAGNOSIS — M75.42 SHOULDER IMPINGEMENT, LEFT: Primary | ICD-10-CM

## 2022-06-07 DIAGNOSIS — Z13.220 SCREENING CHOLESTEROL LEVEL: ICD-10-CM

## 2022-06-07 DIAGNOSIS — M25.541 ARTHRALGIA OF BOTH HANDS: ICD-10-CM

## 2022-06-07 DIAGNOSIS — Z13.89 SCREENING FOR NEPHROPATHY: ICD-10-CM

## 2022-06-07 DIAGNOSIS — Z11.4 SCREENING FOR HIV (HUMAN IMMUNODEFICIENCY VIRUS): ICD-10-CM

## 2022-06-07 DIAGNOSIS — M25.542 ARTHRALGIA OF BOTH HANDS: ICD-10-CM

## 2022-06-07 LAB
ALBUMIN SERPL BCP-MCNC: 3.8 G/DL (ref 3.5–5)
ALP SERPL-CCNC: 74 U/L (ref 46–116)
ALT SERPL W P-5'-P-CCNC: 36 U/L (ref 12–78)
ANION GAP SERPL CALCULATED.3IONS-SCNC: 6 MMOL/L (ref 4–13)
AST SERPL W P-5'-P-CCNC: 18 U/L (ref 5–45)
BASOPHILS # BLD AUTO: 0.04 THOUSANDS/ΜL (ref 0–0.1)
BASOPHILS NFR BLD AUTO: 1 % (ref 0–1)
BILIRUB SERPL-MCNC: 0.69 MG/DL (ref 0.2–1)
BILIRUB UR QL STRIP: NEGATIVE
BUN SERPL-MCNC: 14 MG/DL (ref 5–25)
CALCIUM SERPL-MCNC: 9.4 MG/DL (ref 8.3–10.1)
CHLORIDE SERPL-SCNC: 106 MMOL/L (ref 100–108)
CHOLEST SERPL-MCNC: 227 MG/DL
CLARITY UR: CLEAR
CO2 SERPL-SCNC: 27 MMOL/L (ref 21–32)
COLOR UR: YELLOW
CREAT SERPL-MCNC: 0.7 MG/DL (ref 0.6–1.3)
CRP SERPL QL: <3 MG/L
EOSINOPHIL # BLD AUTO: 0.17 THOUSAND/ΜL (ref 0–0.61)
EOSINOPHIL NFR BLD AUTO: 3 % (ref 0–6)
ERYTHROCYTE [DISTWIDTH] IN BLOOD BY AUTOMATED COUNT: 13.6 % (ref 11.6–15.1)
GFR SERPL CREATININE-BSD FRML MDRD: 93 ML/MIN/1.73SQ M
GLUCOSE P FAST SERPL-MCNC: 103 MG/DL (ref 65–99)
GLUCOSE UR STRIP-MCNC: NEGATIVE MG/DL
HCT VFR BLD AUTO: 41.3 % (ref 34.8–46.1)
HCV AB SER QL: NORMAL
HDLC SERPL-MCNC: 56 MG/DL
HGB BLD-MCNC: 13.5 G/DL (ref 11.5–15.4)
HGB UR QL STRIP.AUTO: NEGATIVE
IMM GRANULOCYTES # BLD AUTO: 0.02 THOUSAND/UL (ref 0–0.2)
IMM GRANULOCYTES NFR BLD AUTO: 0 % (ref 0–2)
KETONES UR STRIP-MCNC: NEGATIVE MG/DL
LDLC SERPL CALC-MCNC: 150 MG/DL (ref 0–100)
LEUKOCYTE ESTERASE UR QL STRIP: NEGATIVE
LYMPHOCYTES # BLD AUTO: 1.4 THOUSANDS/ΜL (ref 0.6–4.47)
LYMPHOCYTES NFR BLD AUTO: 23 % (ref 14–44)
MCH RBC QN AUTO: 28.9 PG (ref 26.8–34.3)
MCHC RBC AUTO-ENTMCNC: 32.7 G/DL (ref 31.4–37.4)
MCV RBC AUTO: 88 FL (ref 82–98)
MONOCYTES # BLD AUTO: 0.38 THOUSAND/ΜL (ref 0.17–1.22)
MONOCYTES NFR BLD AUTO: 6 % (ref 4–12)
NEUTROPHILS # BLD AUTO: 3.98 THOUSANDS/ΜL (ref 1.85–7.62)
NEUTS SEG NFR BLD AUTO: 67 % (ref 43–75)
NITRITE UR QL STRIP: NEGATIVE
NRBC BLD AUTO-RTO: 0 /100 WBCS
PH UR STRIP.AUTO: 6 [PH]
PLATELET # BLD AUTO: 302 THOUSANDS/UL (ref 149–390)
PMV BLD AUTO: 11.7 FL (ref 8.9–12.7)
POTASSIUM SERPL-SCNC: 3.8 MMOL/L (ref 3.5–5.3)
PROT SERPL-MCNC: 7.4 G/DL (ref 6.4–8.2)
PROT UR STRIP-MCNC: NEGATIVE MG/DL
RBC # BLD AUTO: 4.67 MILLION/UL (ref 3.81–5.12)
RHEUMATOID FACT SER QL LA: NEGATIVE
SODIUM SERPL-SCNC: 139 MMOL/L (ref 136–145)
SP GR UR STRIP.AUTO: 1.01 (ref 1–1.03)
TRIGL SERPL-MCNC: 107 MG/DL
UROBILINOGEN UR STRIP-ACNC: <2 MG/DL
WBC # BLD AUTO: 5.99 THOUSAND/UL (ref 4.31–10.16)

## 2022-06-07 PROCEDURE — 80053 COMPREHEN METABOLIC PANEL: CPT

## 2022-06-07 PROCEDURE — 97112 NEUROMUSCULAR REEDUCATION: CPT | Performed by: PHYSICAL THERAPIST

## 2022-06-07 PROCEDURE — 36415 COLL VENOUS BLD VENIPUNCTURE: CPT

## 2022-06-07 PROCEDURE — 85025 COMPLETE CBC W/AUTO DIFF WBC: CPT

## 2022-06-07 PROCEDURE — 86140 C-REACTIVE PROTEIN: CPT

## 2022-06-07 PROCEDURE — 87389 HIV-1 AG W/HIV-1&-2 AB AG IA: CPT

## 2022-06-07 PROCEDURE — 97110 THERAPEUTIC EXERCISES: CPT | Performed by: PHYSICAL THERAPIST

## 2022-06-07 PROCEDURE — 86038 ANTINUCLEAR ANTIBODIES: CPT

## 2022-06-07 PROCEDURE — 86803 HEPATITIS C AB TEST: CPT

## 2022-06-07 PROCEDURE — 81003 URINALYSIS AUTO W/O SCOPE: CPT | Performed by: OBSTETRICS & GYNECOLOGY

## 2022-06-07 PROCEDURE — 80061 LIPID PANEL: CPT

## 2022-06-07 PROCEDURE — 97140 MANUAL THERAPY 1/> REGIONS: CPT | Performed by: PHYSICAL THERAPIST

## 2022-06-07 PROCEDURE — 86430 RHEUMATOID FACTOR TEST QUAL: CPT

## 2022-06-07 NOTE — PROGRESS NOTES
Daily Note     Today's date: 2022  Patient name: Cari Ness  : 1960  MRN: [id number]  Referring provider: Pramod Glass DO  Dx:   Encounter Diagnosis     ICD-10-CM    1  Shoulder impingement, left  M75 42        Start Time: 713  Stop Time: 756  Total time in clinic (min): 43 minutes    Subjective: pt reports that she continues to have shoulder symptoms  She notes that she is doing activity that she probably shouldn't be doing such as yard work  Objective: See treatment diary below      Assessment: Tolerated treatment well  Pt completed all exercises well with improved tolerance  Increased intensity of band exercise was completed and tolerated well  HEP progressed and pt was educated about performance at home  We will continue to progress as tolerated  Patient would benefit from continued PT      Plan: Continue per plan of care        Precautions: MVP, anxiety      Manuals           L shoulder PROM  RK RK RK                                                 Neuro Re-Ed             scap retract 5"x10 5"x10           No monies             Band rows  rtb 2x10 3" gtb 2x10 3"          Band ext  rtb 2x10 3" gtb 2x10 3"          Band ER  rtb 2x10  gtb 2x10          Band IR  rtb 2x10  gtb 2x10                        Ther Ex             pulleys             Wall slides x10  x20          UT stretch 30"x3            Cane flexion  x10 2x10 supine and seated          Cane abd  x10 2x10          Cane ER  x10                                     Ther Activity                                       Gait Training                                       Modalities

## 2022-06-08 LAB
ANA SPECKLED TITR SER: ABNORMAL {TITER}
ANA TITR SER IF: POSITIVE {TITER}
HIV 1+2 AB+HIV1 P24 AG SERPL QL IA: NORMAL
SL AMB NOTE:: ABNORMAL

## 2022-06-09 ENCOUNTER — OFFICE VISIT (OUTPATIENT)
Dept: PHYSICAL THERAPY | Facility: MEDICAL CENTER | Age: 62
End: 2022-06-09
Payer: COMMERCIAL

## 2022-06-09 DIAGNOSIS — M75.42 SHOULDER IMPINGEMENT, LEFT: Primary | ICD-10-CM

## 2022-06-09 PROCEDURE — 97140 MANUAL THERAPY 1/> REGIONS: CPT | Performed by: PHYSICAL THERAPIST

## 2022-06-09 PROCEDURE — 97110 THERAPEUTIC EXERCISES: CPT | Performed by: PHYSICAL THERAPIST

## 2022-06-09 PROCEDURE — 97112 NEUROMUSCULAR REEDUCATION: CPT | Performed by: PHYSICAL THERAPIST

## 2022-06-09 NOTE — PROGRESS NOTES
Daily Note     Today's date: 2022  Patient name: Ammon Bravo  : 1960  MRN: [id number]  Referring provider: Kennard Peabody, DO  Dx:   Encounter Diagnosis     ICD-10-CM    1  Shoulder impingement, left  M75 42        Start Time: 844  Stop Time: 924  Total time in clinic (min): 40 minutes    Subjective: pt reports that she is doing better  She states the hardest thing to do is reach her seat belt  Objective: See treatment diary below      Assessment: Tolerated treatment well  Pt completed all exercises well with good tolerance nad observable improvement in ROM  IASTM was performed to the posterior shoulder and tolerated well  We will continue to progress as tolerated  Patient would benefit from continued PT      Plan: Continue per plan of care        Precautions: MVP, anxiety      Manuals          L shoulder PROM  RK RK RK RK         IASTM L shoulder    posterior shoulder RK         L shoulder jt mobs    Post and inf RK                      Neuro Re-Ed             scap retract 5"x10 5"x10           No monies             Band rows  rtb 2x10 3" gtb 2x10 3" btb 2x10 3"         Band ext  rtb 2x10 3" gtb 2x10 3" btb 2x10 3"         Band ER  rtb 2x10  gtb 2x10 btb 2x10         Band IR  rtb 2x10  gtb 2x10  btb 2x10                      Ther Ex             pulleys             Wall slides x10  x20 10"x10         UT stretch 30"x3            Cane flexion  x10 2x10 supine and seated 2x10 AROM         Cane abd  x10 2x10          Cane ER  x10                                     Ther Activity                                       Gait Training                                       Modalities

## 2022-06-12 DIAGNOSIS — R76.8 POSITIVE ANA (ANTINUCLEAR ANTIBODY): Primary | ICD-10-CM

## 2022-06-12 DIAGNOSIS — M25.549 ARTHRALGIA OF HAND, UNSPECIFIED LATERALITY: ICD-10-CM

## 2022-06-14 ENCOUNTER — OFFICE VISIT (OUTPATIENT)
Dept: OBGYN CLINIC | Facility: MEDICAL CENTER | Age: 62
End: 2022-06-14
Payer: COMMERCIAL

## 2022-06-14 ENCOUNTER — APPOINTMENT (OUTPATIENT)
Dept: RADIOLOGY | Facility: MEDICAL CENTER | Age: 62
End: 2022-06-14
Payer: COMMERCIAL

## 2022-06-14 ENCOUNTER — OFFICE VISIT (OUTPATIENT)
Dept: PHYSICAL THERAPY | Facility: MEDICAL CENTER | Age: 62
End: 2022-06-14
Payer: COMMERCIAL

## 2022-06-14 VITALS
SYSTOLIC BLOOD PRESSURE: 106 MMHG | BODY MASS INDEX: 26.87 KG/M2 | HEIGHT: 62 IN | DIASTOLIC BLOOD PRESSURE: 70 MMHG | HEART RATE: 64 BPM | WEIGHT: 146 LBS

## 2022-06-14 DIAGNOSIS — M25.512 ACUTE PAIN OF LEFT SHOULDER: Primary | ICD-10-CM

## 2022-06-14 DIAGNOSIS — M25.541 ARTHRALGIA OF BOTH HANDS: ICD-10-CM

## 2022-06-14 DIAGNOSIS — M75.42 SHOULDER IMPINGEMENT, LEFT: ICD-10-CM

## 2022-06-14 DIAGNOSIS — M25.512 ACUTE PAIN OF LEFT SHOULDER: ICD-10-CM

## 2022-06-14 DIAGNOSIS — M75.42 SHOULDER IMPINGEMENT, LEFT: Primary | ICD-10-CM

## 2022-06-14 DIAGNOSIS — M25.542 ARTHRALGIA OF BOTH HANDS: ICD-10-CM

## 2022-06-14 PROCEDURE — 97140 MANUAL THERAPY 1/> REGIONS: CPT | Performed by: PHYSICAL THERAPIST

## 2022-06-14 PROCEDURE — 97110 THERAPEUTIC EXERCISES: CPT | Performed by: PHYSICAL THERAPIST

## 2022-06-14 PROCEDURE — 3008F BODY MASS INDEX DOCD: CPT | Performed by: EMERGENCY MEDICINE

## 2022-06-14 PROCEDURE — 1036F TOBACCO NON-USER: CPT | Performed by: EMERGENCY MEDICINE

## 2022-06-14 PROCEDURE — 99203 OFFICE O/P NEW LOW 30 MIN: CPT | Performed by: EMERGENCY MEDICINE

## 2022-06-14 PROCEDURE — 97112 NEUROMUSCULAR REEDUCATION: CPT | Performed by: PHYSICAL THERAPIST

## 2022-06-14 PROCEDURE — 73030 X-RAY EXAM OF SHOULDER: CPT

## 2022-06-14 NOTE — PROGRESS NOTES
Daily Note     Today's date: 2022  Patient name: Remedios Duaret  : 1960  MRN: [id number]  Referring provider: Greta Flaherty DO  Dx:   Encounter Diagnosis     ICD-10-CM    1  Shoulder impingement, left  M75 42        Start Time: 1101  Stop Time: 1148  Total time in clinic (min): 47 minutes    Subjective: pt reports that she has some soreness in the L shoulder possibly from sleeping on it  Other than this, she is doing well  Objective: See treatment diary below      Assessment: Tolerated treatment well  Pt completed all exercises well with minimal complaints of symptoms  Pt experienced some discomfort with L shoulder flexion that immediately improved with STM to the L anterior delt  Pt reports that she only has increased difficulty with reaching behind her back  Shoulder extension exercise was completed and tolerated well  Pt has MD follow up following treatment session  Patient would benefit from continued PT      Plan: Continue per plan of care        Precautions: MVP, anxiety      Manuals  6        L shoulder PROM  RK RK RK RK RK        IASTM L shoulder    posterior shoulder RK anterior/posterior shoulder RK        L shoulder jt mobs    Post and inf RK Post and inf RK                     Neuro Re-Ed             scap retract 5"x10 5"x10           No monies             Band rows  rtb 2x10 3" gtb 2x10 3" btb 2x10 3" btb 2x10 3"        Band ext  rtb 2x10 3" gtb 2x10 3" btb 2x10 3" btb 2x10 3"        Band ER  rtb 2x10  gtb 2x10 btb 2x10 btb 2x10         Band IR  rtb 2x10  gtb 2x10  btb 2x10 btb 2x10                      Ther Ex             pulleys     5'        Wall slides x10  x20 10"x10         UT stretch 30"x3            Cane flexion  x10 2x10 supine and seated 2x10 AROM 2x10 AROM        Cane abd  x10 2x10          Cane ER  x10           Cane ext     2x10                     Ther Activity                                       Gait Training Modalities

## 2022-06-14 NOTE — PROGRESS NOTES
Assessment/Plan:    Diagnoses and all orders for this visit:    Acute pain of left shoulder  -     XR shoulder 2+ vw left; Future    Shoulder impingement, left  -     Ambulatory Referral to Sports Medicine  -     XR shoulder 2+ vw left; Future    Arthralgia of both hands  -     Ambulatory Referral to Sports Medicine    Continue PT  You may use Advil (ibuprofen) 600mg every 6 hours or at least twice per day OR Aleve (naproxen) 250-500mg every 12 hours as needed for pain and inflammation  You may also take Tylenol 500mg every 4-6 hours as needed OR max 1,000mg per dose up to 3 times per day for a total of 3,000mg per day  Check with your primary care physician to see if these medications are safe to take and to make sure they do not interfere with your other medications and medical issues  Return for 4-6 weeks  Chief Complaint:     Chief Complaint   Patient presents with    Left Hand - Pain    Right Hand - Pain    Left Shoulder - Pain     2 wks pt        Subjective:   Patient ID: Joaquin Prescott is a 64 y o  female  NP presents referred by PCP Dr Roman Taylor for Left shoulder pain since February, is mostly posterior and also anterior  She is participating in PT      Review of Systems    The following portions of the patient's chart were reviewed and updated as appropriate:    Allergy:  No Known Allergies      Past Medical History:   Diagnosis Date    Anxiety     Arthritis     Fibroids     GERD (gastroesophageal reflux disease)     Heart disease     Varicella        Past Surgical History:   Procedure Laterality Date    CARDIAC CATHETERIZATION      DILATION AND EVACUATION      TONSILECTOMY AND ADNOIDECTOMY      TUBAL LIGATION         Social History     Socioeconomic History    Marital status: /Civil Union     Spouse name: Not on file    Number of children: Not on file    Years of education: Not on file    Highest education level: Not on file   Occupational History    Not on file Tobacco Use    Smoking status: Former Smoker    Smokeless tobacco: Never Used   Vaping Use    Vaping Use: Never used   Substance and Sexual Activity    Alcohol use: Yes     Comment: socially     Drug use: Never    Sexual activity: Yes     Partners: Male     Birth control/protection: Post-menopausal   Other Topics Concern    Not on file   Social History Narrative    Not on file     Social Determinants of Health     Financial Resource Strain: Not on file   Food Insecurity: Not on file   Transportation Needs: Not on file   Physical Activity: Not on file   Stress: Not on file   Social Connections: Not on file   Intimate Partner Violence: Not on file   Housing Stability: Not on file       Family History   Problem Relation Age of Onset    Breast cancer Family     Colon cancer Family     Breast cancer Mother     Colon cancer Father     Ovarian cancer Neg Hx        Medications:    Current Outpatient Medications:     ALPRAZolam (XANAX) 0 25 mg tablet, Take 0 25 mg by mouth daily as needed, Disp: , Rfl: 0    atenolol (TENORMIN) 25 mg tablet, Take 1 tablet (25 mg total) by mouth in the morning , Disp: 90 tablet, Rfl: 0    citalopram (CeleXA) 10 mg tablet, Take 1 tablet (10 mg total) by mouth in the morning , Disp: 90 tablet, Rfl: 0    glucosamine-chondroitin 500-400 MG tablet, Take 1 tablet by mouth in the morning , Disp: , Rfl:     loratadine (CLARITIN) 10 mg tablet, Take 10 mg by mouth daily, Disp: , Rfl:     Omega-3 Fatty Acids (Omega-3 Fish Oil) 500 MG CAPS, Take 600 mg by mouth in the morning, Disp: , Rfl:     Patient Active Problem List   Diagnosis    Mitral valve prolapse    SI (sacroiliac) pain    Uterine leiomyoma    Postmenopausal bleeding    Postcoital bleeding    Anxiety    Fear of flying    Seasonal allergies       Objective:  /70   Pulse 64   Ht 5' 2" (1 575 m)   Wt 66 2 kg (146 lb)   BMI 26 70 kg/m²     Right Shoulder Exam     Range of Motion   Active abduction: normal External rotation: normal   Internal rotation 90 degrees: normal       Left Shoulder Exam     Range of Motion   Active abduction: normal   External rotation: normal   Internal rotation 0 degrees: abnormal     Muscle Strength   External rotation: 4/5   Supraspinatus: 4/5     Tests   Drop arm: negative    Other   Erythema: absent             Physical Exam      Neurologic Exam    Procedures    I have personally reviewed pertinent films in PACS

## 2022-06-14 NOTE — PATIENT INSTRUCTIONS
You may use Advil (ibuprofen) 600mg every 6 hours or at least twice per day OR Aleve (naproxen) 250-500mg every 12 hours as needed for pain and inflammation  You may also take Tylenol 500mg every 4-6 hours as needed OR max 1,000mg per dose up to 3 times per day for a total of 3,000mg per day  Check with your primary care physician to see if these medications are safe to take and to make sure they do not interfere with your other medications and medical issues  You may want to try a Aia 16 brace for your hand pain  Rotator Cuff Tendinitis   WHAT YOU NEED TO KNOW:   What is rotator cuff tendinitis? Rotator cuff tendinitis is inflammation of the tendons in your shoulder joint  A tendon is a cord of tough tissue that connects your muscles to your bones  The rotator cuff is made up of a group of muscles and tendons that hold the shoulder joint in place  What causes rotator cuff tendinitis? The tendon can become trapped by your arm bone when you lift your arm  Heavy lifting or throwing can cause overuse or an injury  This may happen if you play a sport such as baseball or tennis  A fall or other shoulder injury may also damage your rotator cuff  Calcium deposits may form in the tendons  This causes inflammation and swelling  What are the signs and symptoms of rotator cuff tendinitis? Pain and swelling in your shoulder, especially when you lift your arm over your head    Trouble moving your arm    Shoulder or arm weakness or stiffness    Pain that is worse after you sleep on the affected shoulder    Pain even when you are resting    How is rotator cuff tendinitis diagnosed? Your healthcare provider may test your shoulder by moving your arm in different directions and raising it over your head  Tell your provider if you had a shoulder injury, and when it happened  An x-ray, ultrasound, or MRI may be used to check for tendon injuries or problems   You may be given contrast liquid to help the pictures show up better  Tell the healthcare provider if you have ever had an allergic reaction to contrast liquid  Do not enter the MRI room with any metal  Metal can cause serious injury  Tell the healthcare provider if you have any metal in or on your body  How is rotator cuff tendinitis treated? Medicines  such as steroids or NSAIDs may be used to reduce swelling  This can help relieve pain  Steroids may be injected into the rotator cuff area  NSAIDs are available without a doctor's order  Ask your healthcare provider which medicine is right for you  Ask how much to take and when to take it  Take as directed  NSAIDs can cause stomach bleeding or kidney problems if not taken correctly  Surgery  may be needed if the pain and tightening in your shoulder do not go away  This may also be done if pain worsens or is so severe that it affects your daily activities  During surgery, your healthcare provider may remove bone spurs and inflamed tissue around the shoulder  Physical therapy  can help you improve movement and strength, and decrease pain  A physical therapist will teach you safe exercises  The exercises may help you move your shoulder normally again and strengthen your rotator cuff  You may learn changes to make to your daily activities that will help decrease stress on your tendons  How can I care for my rotator cuff tendinitis at home? Rest as directed  Limit activity on your affected shoulder to decrease stress on the tendon  This may help prevent further damage, decrease pain, and promote healing  Apply ice on your shoulder area  Ice helps decrease swelling and pain  Ice may also help prevent tissue damage  Use an ice pack, or put crushed ice in a plastic bag  Cover it with a towel and place it on your shoulder for 15 to 20 minutes every hour or as directed  Keep your shoulder in the correct position so it will heal faster    This may be done by increasing the height of armrests while you work, drive, and sit  Try not to sleep on the side of your injured shoulder  If you are a woman, wear a sports bra so that the straps are closer to your neck  This may help decrease stress in the affected shoulder  When should I call my doctor or orthopedist?   Elan Oakley have sudden shortness of breath or chest pain  Any part of your arm is numb, tingly, cold, blue, or pale  You have pain and swelling in your shoulder even after you take pain medicine  Your skin is itchy, swollen, or has a rash  Your symptoms are not getting better or are getting worse  You have questions or concerns about your condition or care  CARE AGREEMENT:   You have the right to help plan your care  Learn about your health condition and how it may be treated  Discuss treatment options with your healthcare providers to decide what care you want to receive  You always have the right to refuse treatment  The above information is an  only  It is not intended as medical advice for individual conditions or treatments  Talk to your doctor, nurse or pharmacist before following any medical regimen to see if it is safe and effective for you  © Copyright [employer] 2022 Information is for End User's use only and may not be sold, redistributed or otherwise used for commercial purposes   All illustrations and images included in CareNotes® are the copyrighted property of [employer]  or [address] [place] [address] [employer]

## 2022-06-16 ENCOUNTER — OFFICE VISIT (OUTPATIENT)
Dept: PHYSICAL THERAPY | Facility: MEDICAL CENTER | Age: 62
End: 2022-06-16
Payer: COMMERCIAL

## 2022-06-16 DIAGNOSIS — M75.42 SHOULDER IMPINGEMENT, LEFT: Primary | ICD-10-CM

## 2022-06-16 PROCEDURE — 97140 MANUAL THERAPY 1/> REGIONS: CPT | Performed by: PHYSICAL THERAPIST

## 2022-06-16 PROCEDURE — 97110 THERAPEUTIC EXERCISES: CPT | Performed by: PHYSICAL THERAPIST

## 2022-06-16 PROCEDURE — 97112 NEUROMUSCULAR REEDUCATION: CPT | Performed by: PHYSICAL THERAPIST

## 2022-06-16 NOTE — PROGRESS NOTES
Daily Note     Today's date: 2022  Patient name: Altaf Ashton  : 1960  MRN: [id number]  Referring provider: Natalie Basurto DO  Dx:   Encounter Diagnosis     ICD-10-CM    1  Shoulder impingement, left  M75 42        Start Time: 713  Stop Time: 755  Total time in clinic (min): 42 minutes    Subjective: pt reported that she had follow up with sports medicine MD   Pt was told to continue with PT  Pt reports that she notes improvement in shoulder  Objective: See treatment diary below      Assessment: Tolerated treatment well  Pt reports improvement in shoulder symptoms and only reports issues with reaching behind her  This was trialed in clinic with report of symptoms in infraspinatus musculature  Self massage technique was performed with good tolerance and improvement in motion  Pt was encouraged to continue HEP and add self massage to this muscle in order to improve upon remaining symptom  We will continue to progress as tolerated  Patient would benefit from continued PT      Plan: Continue per plan of care        Precautions: MVP, anxiety      Manuals  6 6 616       L shoulder PROM  RK RK RK RK RK RK       IASTM L shoulder    posterior shoulder RK anterior/posterior shoulder RK anterior/posterior shoulder RK       L shoulder jt mobs    Post and inf RK Post and inf RK        Self massage      infraspinatus RK       Neuro Re-Ed             scap retract 5"x10 5"x10           No monies             Band rows  rtb 2x10 3" gtb 2x10 3" btb 2x10 3" btb 2x10 3" btb 2x10 3"       Band ext  rtb 2x10 3" gtb 2x10 3" btb 2x10 3" btb 2x10 3" btb 2x10 3"       Band ER  rtb 2x10  gtb 2x10 btb 2x10 btb 2x10  btb 2x10       Band IR  rtb 2x10  gtb 2x10  btb 2x10 btb 2x10  btb 2x10                    Ther Ex             pulleys     5' 5'       Wall slides x10  x20 10"x10         UT stretch 30"x3            Cane flexion  x10 2x10 supine and seated 2x10 AROM 2x10 AROM 2x10 AROM #1 supine and seated       Cane abd  x10 2x10          Cane ER  x10           Cane ext     2x10        SL ER      2x10 #1       Ther Activity                                       Gait Training                                       Modalities

## 2022-06-21 ENCOUNTER — OFFICE VISIT (OUTPATIENT)
Dept: PHYSICAL THERAPY | Facility: MEDICAL CENTER | Age: 62
End: 2022-06-21
Payer: COMMERCIAL

## 2022-06-21 DIAGNOSIS — M75.42 SHOULDER IMPINGEMENT, LEFT: Primary | ICD-10-CM

## 2022-06-21 PROCEDURE — 97140 MANUAL THERAPY 1/> REGIONS: CPT | Performed by: PHYSICAL THERAPIST

## 2022-06-21 PROCEDURE — 97112 NEUROMUSCULAR REEDUCATION: CPT | Performed by: PHYSICAL THERAPIST

## 2022-06-21 PROCEDURE — 97110 THERAPEUTIC EXERCISES: CPT | Performed by: PHYSICAL THERAPIST

## 2022-06-23 ENCOUNTER — EVALUATION (OUTPATIENT)
Dept: PHYSICAL THERAPY | Facility: MEDICAL CENTER | Age: 62
End: 2022-06-23
Payer: COMMERCIAL

## 2022-06-23 DIAGNOSIS — M75.42 SHOULDER IMPINGEMENT, LEFT: Primary | ICD-10-CM

## 2022-06-23 PROCEDURE — 97112 NEUROMUSCULAR REEDUCATION: CPT | Performed by: PHYSICAL THERAPIST

## 2022-06-23 PROCEDURE — 97140 MANUAL THERAPY 1/> REGIONS: CPT | Performed by: PHYSICAL THERAPIST

## 2022-06-23 NOTE — PROGRESS NOTES
PT Evaluation     Today's date: 2022  Patient name: Varun Ramos  : 1960  MRN: [id number]  Referring provider: Moris Kunz DO  Dx:   Encounter Diagnosis     ICD-10-CM    1  Shoulder impingement, left  M75 42        Start Time: 714  Stop Time: 0750  Total time in clinic (min): 36 minutes    Assessment  Assessment details: Pt is a 64 y o female who Has completed 8 PT sessions to this date  The patient has made improvements in decreased L shoulder pain, improved L shoulder ROM, improved L shoulder strength and improved activity tolerance  However, the patient continues to have increased difficulty with decreased b/l shoulder strength, most notably in the ERs  Pt was educated about this and encouraged to continue HEP to improve remaining limitations  At this time, pt and therapist agree to transition the patient to independent management of symptoms and HEP completion  Pt was educated to contact PT with any questions or concerns in the future  Pt will be DC from PT       Impairments: abnormal muscle tone, impaired physical strength and pain with function    Symptom irritability: lowUnderstanding of Dx/Px/POC: good   Prognosis: good    Goals  STGs: 4 weeks  1) Pt will have SPR decrease of 2 units at worse- met  2) pt will have improved L shoulder flexion strength by 1/2 muscle grade- met  3) pt will have improved foto score of 10 points- met    LTGs: 8 weeks  1) pt will be independent with HEP by D/C- met  2) pt will be independent with symptom management by D/C- met  3) pt will pt will subjectively report improved ability to reach behind her with no more than 2/10 pain in order to demonstrate improved functional ability with improved symptom management by DC - part met (rarely gets increased pain with motion)    Plan  Patient would benefit from: skilled physical therapy  Planned modality interventions: cryotherapy and thermotherapy: hydrocollator packs  Planned therapy interventions: joint mobilization, manual therapy, neuromuscular re-education, patient education, postural training, strengthening, stretching, therapeutic activities, therapeutic exercise, home exercise program and functional ROM exercises  Frequency: 2x week  Duration in weeks: 12  Plan of Care beginning date: 5/31/2022  Plan of Care expiration date: 8/23/2022  Treatment plan discussed with: patient        Subjective Evaluation    History of Present Illness  Mechanism of injury: DOO: 3 months  RAÚL: insidious       Subjective Comments: "everything is better"  Pt reports improvement in reaching backwards, improved tolerance to using towel after a shower  Pt also notes that the exercise performance is improved as well  Pt cannot note any other functional limitations or other symptoms at this time  At this time, pt feels comfortable with HEP and would like to try indepedence from PT  Pain   Rest: 0/10   Best: 0/10   Worst: 7/10- "very rarely"            Objective     Palpation   Left   Hypertonic in the infraspinatus  Tenderness of the infraspinatus       Active Range of Motion   Left Shoulder   Flexion: 155 degrees   Abduction: 168 degrees   External rotation BTH: T6   Internal rotation BTB: T8     Right Shoulder   Flexion: 174 degrees   Abduction: 155 degrees   External rotation BTH: T6   Internal rotation BTB: T4     Passive Range of Motion   Left Shoulder   Normal passive range of motion    Strength/Myotome Testing     Left Shoulder     Planes of Motion   Flexion: 4+   Abduction: 4+   External rotation at 0°: 4+   Internal rotation at 0°: 4+     Isolated Muscles   Infraspinatus: 4   Rhomboids: 4     Right Shoulder     Planes of Motion   Flexion: 5   Abduction: 5   External rotation at 0°: 5   Internal rotation at 0°: 5     Isolated Muscles   Infraspinatus: 4   Rhomboids: 4              Precautions: MVP, anxiety      Manuals 5/31 6/2 6/7 6/9 6/14 6/16 6/21 6/23     L shoulder PROM  RK RK RK RK RK RK RK RK     IASTM L shoulder posterior shoulder RK anterior/posterior shoulder RK anterior/posterior shoulder RK RK anterior/ posterior shoulder  RK     L shoulder jt mobs    Post and inf RK Post and inf RK        Self massage      infraspinatus RK infraspinatus RK RK     Neuro Re-Ed             scap retract 5"x10 5"x10           No monies             Band rows  rtb 2x10 3" gtb 2x10 3" btb 2x10 3" btb 2x10 3" btb 2x10 3" btb 2x10 3" btb 2x10 3"     Band ext  rtb 2x10 3" gtb 2x10 3" btb 2x10 3" btb 2x10 3" btb 2x10 3" btb 2x10 3" btb 2x10 3"     Band ER  rtb 2x10  gtb 2x10 btb 2x10 btb 2x10  btb 2x10 btb 2x10 btb 2x10      Band IR  rtb 2x10  gtb 2x10  btb 2x10 btb 2x10  btb 2x10 btb 2x10 btb 2x10                  Ther Ex             pulleys     5' 5' 5' 5'     Wall slides x10  x20 10"x10         UT stretch 30"x3            Cane flexion  x10 2x10 supine and seated 2x10 AROM 2x10 AROM 2x10 AROM #1 supine and seated 2x10 AROM #1 flex       Cane abd  x10 2x10    2x10 AROM #1 scap      Cane ER  x10           Cane ext     2x10        SL ER      2x10 #1 2x10 active release      Towel IR        10"x5                  Ther Activity                                       Gait Training                                       Modalities

## 2022-07-12 ENCOUNTER — TELEPHONE (OUTPATIENT)
Dept: FAMILY MEDICINE CLINIC | Facility: MEDICAL CENTER | Age: 62
End: 2022-07-12

## 2022-07-12 NOTE — TELEPHONE ENCOUNTER
----- Message from Randy Jose Guadalupe sent at 7/12/2022  3:19 PM EDT -----  Regarding: Covid prescription   Dr Teri Obando  I tested positive for Covid today  My symptoms are scratchy throat, bad headache and hoarse cough   Should I have a prescription or talk OTC MEDS  thank you  Stephanie Cleary

## 2022-07-12 NOTE — TELEPHONE ENCOUNTER
S/w patient   cdc guidelines discussed  Patient developed symptoms yesterday , mild , denies fever however c/o a lot coughing and her eyes hurt  Advised OTC medication ,  offered a VV to discuss further options of treatment if she would like  patient has MVP  She will see how she is tomorrow  Aware to proceed to the Er if develops high fever, SOB or CP

## 2022-08-17 DIAGNOSIS — Z79.899 CURRENT USE OF BETA BLOCKER: ICD-10-CM

## 2022-08-17 DIAGNOSIS — F41.9 ANXIETY: ICD-10-CM

## 2022-08-17 RX ORDER — CITALOPRAM 10 MG/1
10 TABLET ORAL DAILY
Qty: 90 TABLET | Refills: 0 | Status: SHIPPED | OUTPATIENT
Start: 2022-08-17

## 2022-08-17 RX ORDER — ATENOLOL 25 MG/1
25 TABLET ORAL DAILY
Qty: 90 TABLET | Refills: 0 | Status: SHIPPED | OUTPATIENT
Start: 2022-08-17

## 2022-09-22 ENCOUNTER — OFFICE VISIT (OUTPATIENT)
Dept: FAMILY MEDICINE CLINIC | Facility: MEDICAL CENTER | Age: 62
End: 2022-09-22
Payer: COMMERCIAL

## 2022-09-22 ENCOUNTER — APPOINTMENT (OUTPATIENT)
Dept: RADIOLOGY | Facility: MEDICAL CENTER | Age: 62
End: 2022-09-22
Payer: COMMERCIAL

## 2022-09-22 VITALS
HEIGHT: 62 IN | HEART RATE: 80 BPM | TEMPERATURE: 98.2 F | DIASTOLIC BLOOD PRESSURE: 78 MMHG | WEIGHT: 142 LBS | SYSTOLIC BLOOD PRESSURE: 110 MMHG | BODY MASS INDEX: 26.13 KG/M2

## 2022-09-22 DIAGNOSIS — M54.2 NECK PAIN: Primary | ICD-10-CM

## 2022-09-22 DIAGNOSIS — M24.80 CERVICAL SPINE CREPITUS: ICD-10-CM

## 2022-09-22 DIAGNOSIS — G44.209 TENSION HEADACHE: ICD-10-CM

## 2022-09-22 DIAGNOSIS — M54.2 NECK PAIN: ICD-10-CM

## 2022-09-22 DIAGNOSIS — M62.89 MUSCLE TIGHTNESS: ICD-10-CM

## 2022-09-22 PROCEDURE — 72040 X-RAY EXAM NECK SPINE 2-3 VW: CPT

## 2022-09-22 PROCEDURE — 99214 OFFICE O/P EST MOD 30 MIN: CPT | Performed by: STUDENT IN AN ORGANIZED HEALTH CARE EDUCATION/TRAINING PROGRAM

## 2022-09-22 RX ORDER — MELOXICAM 15 MG/1
15 TABLET ORAL DAILY PRN
Qty: 14 TABLET | Refills: 0 | Status: SHIPPED | OUTPATIENT
Start: 2022-09-22 | End: 2022-10-06

## 2022-09-22 NOTE — PROGRESS NOTES
[address] - Clinic Note  Cristiane Barrera, [place], 22     Afua Lucia MRN: [id number] : 1960 Age: 64 y o  Assessment/Plan     1  Neck pain    - NSAIDs per medication orders   - Plain film x-rays   - PT referral   - XR spine cervical 2 or 3 vw injury; Future  - Ambulatory Referral to Physical Therapy; Future  - meloxicam (Mobic) 15 mg tablet; Take 1 tablet (15 mg total) by mouth daily as needed for moderate pain for up to 14 days  Dispense: 14 tablet; Refill: 0  - patient will proceed with NSAID p r n , topical heat, menthol, physical therapy course, will follow-up x-ray  - if pain persistent, may consider referral to Spine/pain management for further interventions if appropriate    2  Cervical spine crepitus    - XR spine cervical 2 or 3 vw injury; Future    3  Muscle tightness    - Ambulatory Referral to Physical Therapy; Future    4  Tension headache    - do suspect patient's headaches are tension headaches related to tight cervical and trapezius musculature  - advised about physical therapy, NSAID p r n   - advised about topical menthol products and topical heat application    Afua Lucia acknowledged understanding of treatment plan, all questions answered  Subjective     Afua Lucia is a 64 y o  female who presents for evaluation of neck pain  Event that precipitated these symptoms: none known  Onset of symptoms was beginning of August   Patient offers she was seen at [facility] day prior and was resting her neck on sink and the next day attributed neck pain to aforementioned  Current symptoms are pain in occipital region "ache", when I turn "hear cracking sound"  Patient denies any numbness or tingling bilateral upper extremities, no weakness bilateral upper extremities  Patient has had no prior neck problems  Patient has been using NSAID which offer some relief but, pain does return    Also complaining of headaches, "top of head, come forward", no nausea, no vomiting, no visual disturbance, no gait disturbance, no aura      The following portions of the patient's history were reviewed and updated as appropriate: allergies, current medications, past family history, past medical history, past social history, past surgical history and problem list      Past Medical History:   Diagnosis Date    Anxiety     Arthritis     Fibroids     GERD (gastroesophageal reflux disease)     Heart disease     Varicella        No Known Allergies    Past Surgical History:   Procedure Laterality Date    CARDIAC CATHETERIZATION      DILATION AND EVACUATION      TONSILECTOMY AND ADNOIDECTOMY      TUBAL LIGATION         Family History   Problem Relation Age of Onset    Breast cancer Family     Colon cancer Family     Breast cancer Mother     Colon cancer Father     Ovarian cancer Neg Hx        Social History     Socioeconomic History    Marital status: /Civil Union     Spouse name: None    Number of children: None    Years of education: None    Highest education level: None   Occupational History    None   Tobacco Use    Smoking status: Former Smoker    Smokeless tobacco: Never Used   Vaping Use    Vaping Use: Never used   Substance and Sexual Activity    Alcohol use: Yes     Comment: socially     Drug use: Never    Sexual activity: Yes     Partners: Male     Birth control/protection: Post-menopausal   Other Topics Concern    None   Social History Narrative    None     Social Determinants of Health     Financial Resource Strain: Not on file   Food Insecurity: Not on file   Transportation Needs: Not on file   Physical Activity: Not on file   Stress: Not on file   Social Connections: Not on file   Intimate Partner Violence: Not on file   Housing Stability: Not on file       Current Outpatient Medications   Medication Sig Dispense Refill    ALPRAZolam (XANAX) 0 25 mg tablet Take 0 25 mg by mouth daily as needed  0    atenolol (TENORMIN) 25 mg tablet TAKE 1 TABLET (25 MG TOTAL) BY MOUTH IN THE MORNING  90 tablet 0    citalopram (CeleXA) 10 mg tablet TAKE 1 TABLET (10 MG TOTAL) BY MOUTH IN THE MORNING  90 tablet 0    glucosamine-chondroitin 500-400 MG tablet Take 1 tablet by mouth in the morning   loratadine (CLARITIN) 10 mg tablet Take 10 mg by mouth daily      Omega-3 Fatty Acids (Omega-3 Fish Oil) 500 MG CAPS Take 600 mg by mouth in the morning       No current facility-administered medications for this visit  Review of Systems     And as noted in HPI    Objective      /78 (BP Location: Left arm, Patient Position: Sitting, Cuff Size: Adult)   Pulse 80   Temp 98 2 °F (36 8 °C)   Ht 5' 2" (1 575 m)   Wt 64 4 kg (142 lb)   BMI 25 97 kg/m²     Physical Exam  Vitals reviewed  Constitutional:       General: She is not in acute distress  Appearance: Normal appearance  She is not toxic-appearing  HENT:      Head: Normocephalic and atraumatic  Eyes:      Conjunctiva/sclera: Conjunctivae normal    Neck:      Comments: Patient with tight occipital musculature bilaterally and tight trapezius musculature bilaterally left more so than right  Cardiovascular:      Pulses: Normal pulses  Heart sounds: Normal heart sounds  Pulmonary:      Effort: Pulmonary effort is normal    Musculoskeletal:      Cervical back: Muscular tenderness present  No spinous process tenderness  Decreased range of motion (Decreased range of motion with flexion, extension, side bending bilateral, rotation bilateral)  Comments: 5/5 muscle strength bilateral upper extremities, sensation to light touch intact bilateral upper extremities   Skin:     General: Skin is warm and dry  Capillary Refill: Capillary refill takes less than 2 seconds  Neurological:      Mental Status: She is alert and oriented to person, place, and time  Psychiatric:         Mood and Affect: Mood normal          Behavior: Behavior normal          Thought Content:  Thought content normal          Judgment: Judgment normal              Some portions of this record may have been generated with voice recognition software  There may be translation, syntax, or grammatical errors  Occasional wrong word or "sound-a-like" substitutions may have occurred due to the inherent limitations of the voice recognition software  Read the chart carefully and recognize, using context, where substations may have occurred  If you have any questions, please contact the dictating provider for clarification or correction, as needed

## 2022-10-03 ENCOUNTER — EVALUATION (OUTPATIENT)
Dept: PHYSICAL THERAPY | Facility: MEDICAL CENTER | Age: 62
End: 2022-10-03
Payer: COMMERCIAL

## 2022-10-03 DIAGNOSIS — M54.2 NECK PAIN: ICD-10-CM

## 2022-10-03 DIAGNOSIS — M62.89 MUSCLE TIGHTNESS: ICD-10-CM

## 2022-10-03 PROCEDURE — 97161 PT EVAL LOW COMPLEX 20 MIN: CPT | Performed by: PHYSICAL THERAPIST

## 2022-10-03 PROCEDURE — 97110 THERAPEUTIC EXERCISES: CPT | Performed by: PHYSICAL THERAPIST

## 2022-10-03 NOTE — PROGRESS NOTES
PT Evaluation     Today's date: 10/3/2022  Patient name: Morena Finch  : 1960  MRN: [id number]  Referring provider: Kendra Chawla DO  Dx:   Encounter Diagnosis     ICD-10-CM    1  Neck pain  M54 2 Ambulatory Referral to Physical Therapy   2  Muscle tightness  M62 89 Ambulatory Referral to Physical Therapy       Start Time: 1708  Stop Time: 1735  Total time in clinic (min): 27 minutes    Assessment  Assessment details: Pt is a 64 y o female who presents with increased cervical pain, decreased cervical ROM, and decreased tolerance to sleeping due to increased pain  Pt reports minimal functional limitations due to symptoms improving with activity and worse when she is still such as when sleeping  Pt is especially tender to the touch in the suboccipital musculature L>R  Pt has been performing self massage, heat and using topical creams with benefit  Pt was educated about symptoms at present and plan for PT moving forward  HEP completed in clinic including cervical ROM exercises with good tolerance  I believe this patient is a good candidate for and will benefit from skilled physical therapy for manual therapy to the c/s, cervical ROM exercises, cervical and postural strengthening exercises and mechanics training to improve symptoms and assist the patient to return to PLOF  Thank you for the opportunity to participate in [facility] care      Positive Prognostic Indicators: desire to improve, good attitude towards PT    Negative Prognostic Indicators: chronic pathology  Impairments: abnormal muscle firing, abnormal or restricted ROM, abnormal movement, activity intolerance, impaired physical strength, lacks appropriate home exercise program, pain with function and poor posture     Symptom irritability: lowUnderstanding of Dx/Px/POC: good   Prognosis: good    Goals  STGs: 4 weeks  1) Pt will have SPR decrease of 2 units at rest  2) pt will have improved L cervical lateral flexion by 10*  3) pt will have improved foto score of 10 points    LTGs: 8 weeks  1) pt will be independent with HEP by D/C  2) pt will be independent with symptom management by D/C  3) pt will subjectively report improved tolerance to sleeping with at least 50% reduction of symptoms during sleep by DC  Plan  Patient would benefit from: skilled physical therapy  Planned modality interventions: cryotherapy and thermotherapy: hydrocollator packs  Planned therapy interventions: joint mobilization, manual therapy, neuromuscular re-education, patient education, postural training, strengthening, stretching, therapeutic activities, therapeutic exercise, home exercise program and functional ROM exercises  Frequency: 2x week  Duration in weeks: 12  Plan of Care beginning date: 10/3/2022  Plan of Care expiration date: 12/26/2022  Treatment plan discussed with: patient        Subjective Evaluation    History of Present Illness  Mechanism of injury: Subjective Comments:began beginning of august, pt reports that pain began at base of head and went down spine  Pt reports that she was having shooting pain into her head  Pt went to PCP with xray showing degenerative changes  Pt has been taking pain mediation and performing self massage and heat with relief  Pt is also using topical with improvement  Pt reports the pain will awake her at night but she is better during the day when she is moving  Pt denies radicular symptoms  Pt denies N&T  Pt denies RAÚL  Pt confirms headaches  Pain   Rest: 4/10   Best: 0/10   Worst: 8/10    Relieving Factors: heat, massage, topical    Exacerbating Factors: n/a, looking down    Sleeping: increased difficulty due to pain (headache)    Home Set-up: indepednent    ADLs:  Independent      Work/Hobbies: works as a      Previous Treatment:  N/a at home treatment     Goals:  Decrease pain,             Objective     Palpation   Left   Hypertonic in the scalenes and upper trapezius     Tenderness of the suboccipitals  Right   Hypertonic in the scalenes and upper trapezius  Tenderness of the suboccipitals  Active Range of Motion   Cervical/Thoracic Spine       Cervical    Flexion: 58 degrees   Extension: 23 degrees      Left lateral flexion: 18 degrees      Right lateral flexion: 37 degrees      Left rotation: 60 degrees  Right rotation: 65 degrees             Joint Play     Hypomobile: C3, C4, C5, C6 and C7     Strength/Myotome Testing     Left Shoulder     Planes of Motion   Flexion: 5   Abduction: 5   External rotation at 0°: 5   Internal rotation at 0°: 5     Right Shoulder     Planes of Motion   Flexion: 5   Abduction: 5   External rotation at 0°: 5   Internal rotation at 0°: 5     Left Elbow   Flexion: 5  Extension: 5    Right Elbow   Flexion: 5  Extension: 5    Left Wrist/Hand   Wrist extension: 5  Wrist flexion: 5  Thumb extension: 5    Right Wrist/Hand   Wrist extension: 5  Wrist flexion: 5  Thumb extension: 5             Precautions: MVP, anxiety      Manuals 10/3            IASTM c/s b/l             SOR                                       Neuro Re-Ed             Band rows             Band ext             No monies             Supine chin tucks                                                    Ther Ex             Cervical AROM x10 ea  UBE             Wall slides             UT stretch 10"x5 ea                                                                  Ther Activity                                       Gait Training                                       Modalities

## 2022-10-05 ENCOUNTER — OFFICE VISIT (OUTPATIENT)
Dept: PHYSICAL THERAPY | Facility: MEDICAL CENTER | Age: 62
End: 2022-10-05
Payer: COMMERCIAL

## 2022-10-05 DIAGNOSIS — M54.2 NECK PAIN: Primary | ICD-10-CM

## 2022-10-05 DIAGNOSIS — M62.89 MUSCLE TIGHTNESS: ICD-10-CM

## 2022-10-05 PROCEDURE — 97112 NEUROMUSCULAR REEDUCATION: CPT | Performed by: PHYSICAL THERAPIST

## 2022-10-05 PROCEDURE — 97140 MANUAL THERAPY 1/> REGIONS: CPT | Performed by: PHYSICAL THERAPIST

## 2022-10-05 NOTE — PROGRESS NOTES
Daily Note     Today's date: 10/5/2022  Patient name: Obey Rodriguez  : 1960  MRN: [id number]  Referring provider: Checo العراقي DO  Dx:   Encounter Diagnosis     ICD-10-CM    1  Neck pain  M54 2    2  Muscle tightness  M62 89        Start Time: 845  Stop Time: 244  Total time in clinic (min): 37 minutes    Subjective: pt reports that the night after IE she had increased difficulty sleeping  Since then she reports improvement in neck symptoms  Objective: See treatment diary below      Assessment: Tolerated treatment well  Pt completed all exercises well with good tolerance and minimal complaitns of symptoms  Pt tolerated manual therapy well with improvement of symptoms following  Pt encouraged to continue HEP as tolerated  Patient would benefit from continued PT      Plan: Continue per plan of care  Precautions: MVP, anxiety    Pt 1:1 from 843-910  Manuals 10/3 10/5           IASTM c/s b/l             SOR                                       Neuro Re-Ed             Band rows  gtb 2x10           Band ext  gtb 2x10           No monies  rtb 2x10           Supine chin tucks  x10 3"                                                  Ther Ex             Cervical AROM x10 ea  x10 ea  UBE  5' retro           Wall slides  5-10"x10           UT stretch 10"x5 ea  10"x5 ea                                                                 Ther Activity                                       Gait Training                                       Modalities

## 2022-10-10 ENCOUNTER — OFFICE VISIT (OUTPATIENT)
Dept: PHYSICAL THERAPY | Facility: MEDICAL CENTER | Age: 62
End: 2022-10-10
Payer: COMMERCIAL

## 2022-10-10 DIAGNOSIS — M62.89 MUSCLE TIGHTNESS: ICD-10-CM

## 2022-10-10 DIAGNOSIS — M54.2 NECK PAIN: Primary | ICD-10-CM

## 2022-10-10 PROCEDURE — 97140 MANUAL THERAPY 1/> REGIONS: CPT | Performed by: PHYSICAL THERAPIST

## 2022-10-10 NOTE — PROGRESS NOTES
Daily Note     Today's date: 10/10/2022  Patient name: Andres Segura  : 1960  MRN: [id number]  Referring provider: Emily Everett DO  Dx:   Encounter Diagnosis     ICD-10-CM    1  Neck pain  M54 2    2  Muscle tightness  M62 89        Start Time:   Stop Time:   Total time in clinic (min): 38 minutes    Subjective: pt reports that she is doing pretty good  She reports that she has a headache today but other than this she is doing well  Objective: See treatment diary below      Assessment: Tolerated treatment well  Pt completed all exercises well with good tolerance  Pt reports reduction of headache severity post session  Pt encouraged to continue to complete hep as instructed  Patient would benefit from continued PT      Plan: Continue per plan of care  Precautions: MVP, anxiety    Pt 1:1 from 813-513  Manuals 10/3 10/5 10/10          IASTM c/s b/l   RK          SOR   RK                                    Neuro Re-Ed             Band rows  gtb 2x10 btb 2x10          Band ext  gtb 2x10 btb 2x10          No monies  rtb 2x10 rtb 2x10          Supine chin tucks  x10 3" 2x10 5"                                                 Ther Ex             Cervical AROM x10 ea  x10 ea  x10 ea  UBE  5' retro 5' retro          Wall slides  5-10"x10 5-10"x10          UT stretch 10"x5 ea  10"x5 ea  10"x5 ea                                                                Ther Activity                                       Gait Training                                       Modalities

## 2022-10-11 DIAGNOSIS — R51.9 NEW ONSET OF HEADACHES AFTER AGE 50: Primary | ICD-10-CM

## 2022-10-12 ENCOUNTER — OFFICE VISIT (OUTPATIENT)
Dept: PHYSICAL THERAPY | Facility: MEDICAL CENTER | Age: 62
End: 2022-10-12
Payer: COMMERCIAL

## 2022-10-12 DIAGNOSIS — M62.89 MUSCLE TIGHTNESS: ICD-10-CM

## 2022-10-12 DIAGNOSIS — M54.2 NECK PAIN: Primary | ICD-10-CM

## 2022-10-12 PROCEDURE — 97140 MANUAL THERAPY 1/> REGIONS: CPT | Performed by: PHYSICAL THERAPIST

## 2022-10-12 NOTE — PROGRESS NOTES
Daily Note     Today's date: 10/12/2022  Patient name: Rodolfo Carney  : 1960  MRN: [id number]  Referring provider: Reji Marie DO  Dx:   Encounter Diagnosis     ICD-10-CM    1  Neck pain  M54 2    2  Muscle tightness  M62 89        Start Time: 930  Stop Time: 1007  Total time in clinic (min): 37 minutes    Subjective: pt reports that she noted increased soreness following LV and increased headache  She notes the neck pain is improved but the frequency of her headaches has increased  She contacted PCP to have MRI scheduled for peace of mind  Objective: See treatment diary below      Assessment: Tolerated treatment well  Manual therapy was tolerated well with no increase in symptoms  Pt noted increased symptoms towards end of the day  Pt was given HEP for postural correction and asked to complete this frequently to assess symptom change  We will continue to progress as tolerated  Patient would benefit from continued PT      Plan: Continue per plan of care  Precautions: MVP, anxiety    Pt 1:1 from 930-940 and 950-1000  Manuals 10/3 10/5 10/10 10/12         IASTM c/s b/l   RK RK         SOR   RK RK                                   Neuro Re-Ed             Band rows  gtb 2x10 btb 2x10 gtb 2x10         Band ext  gtb 2x10 btb 2x10 gtb 2x10         No monies  rtb 2x10 rtb 2x10          Supine chin tucks  x10 3" 2x10 5" 2x10 5"         Seated chin tucks    5" 2x10                                   Ther Ex             Cervical AROM x10 ea  x10 ea  x10 ea  x10 ea  UBE  5' retro 5' retro          Wall slides  5-10"x10 5-10"x10          UT stretch 10"x5 ea  10"x5 ea  10"x5 ea  10"x5 ea                                                               Ther Activity                                       Gait Training                                       Modalities

## 2022-10-17 ENCOUNTER — OFFICE VISIT (OUTPATIENT)
Dept: PHYSICAL THERAPY | Facility: MEDICAL CENTER | Age: 62
End: 2022-10-17
Payer: COMMERCIAL

## 2022-10-17 DIAGNOSIS — M54.2 NECK PAIN: Primary | ICD-10-CM

## 2022-10-17 DIAGNOSIS — M62.89 MUSCLE TIGHTNESS: ICD-10-CM

## 2022-10-17 PROCEDURE — 97140 MANUAL THERAPY 1/> REGIONS: CPT | Performed by: PHYSICAL THERAPIST

## 2022-10-17 NOTE — PROGRESS NOTES
Daily Note     Today's date: 10/17/2022  Patient name: Ani Shelton  : 1960  MRN: [id number]  Referring provider: Shane Moreno DO  Dx:   Encounter Diagnosis     ICD-10-CM    1  Neck pain  M54 2    2  Muscle tightness  M62 89        Start Time:   Stop Time:   Total time in clinic (min): 35 minutes    Subjective: pt reports that since LV she has had sporadic symptoms over the weekend  She repots that today has been "the best it has been in 2 months"  Pt denies symptoms presently  Objective: See treatment diary below      Assessment: Tolerated treatment well  No change was made to treatment plan from LV  Pt was educate to continue HEP as prescribed in order to assess symptom reduction retention  Plan to progress NV as able  Patient would benefit from continued PT      Plan: Continue per plan of care  Precautions: MVP, anxiety    Pt 1:1 from 178-551  Manuals 10/3 10/5 10/10 10/12 10/17        IASTM c/s b/l   RK RK RK        SOR   RK RK RK + TpR L UT                                  Neuro Re-Ed             Band rows  gtb 2x10 btb 2x10 gtb 2x10 gtb 2x10        Band ext  gtb 2x10 btb 2x10 gtb 2x10 gtb 2x10        No monies  rtb 2x10 rtb 2x10  rtb 3" x20        Supine chin tucks  x10 3" 2x10 5" 2x10 5" 2x10 5"        Seated chin tucks    5" 2x10 2x10 5"                                  Ther Ex             Cervical AROM x10 ea  x10 ea  x10 ea  x10 ea  x10 ea  UBE  5' retro 5' retro          Wall slides  5-10"x10 5-10"x10          UT stretch 10"x5 ea  10"x5 ea  10"x5 ea  10"x5 ea  10"x5 ea                                                              Ther Activity                                       Gait Training                                       Modalities

## 2022-10-19 ENCOUNTER — OFFICE VISIT (OUTPATIENT)
Dept: PHYSICAL THERAPY | Facility: MEDICAL CENTER | Age: 62
End: 2022-10-19
Payer: COMMERCIAL

## 2022-10-19 DIAGNOSIS — M62.89 MUSCLE TIGHTNESS: ICD-10-CM

## 2022-10-19 DIAGNOSIS — M54.2 NECK PAIN: Primary | ICD-10-CM

## 2022-10-19 PROCEDURE — 97140 MANUAL THERAPY 1/> REGIONS: CPT | Performed by: PHYSICAL THERAPIST

## 2022-10-19 PROCEDURE — 97110 THERAPEUTIC EXERCISES: CPT | Performed by: PHYSICAL THERAPIST

## 2022-10-19 NOTE — PROGRESS NOTES
Daily Note     Today's date: 10/19/2022  Patient name: Robert Bergeron  : 1960  MRN: [id number]  Referring provider: Carl Guzman DO  Dx:   Encounter Diagnosis     ICD-10-CM    1  Neck pain  M54 2    2  Muscle tightness  M62 89        Start Time: 930  Stop Time: 1006  Total time in clinic (min): 36 minutes    Subjective: pt reports that she has minimal pain on the L side of her top of her neck  Denies headaches  Reports increased difficulty rotating head to the L       Objective: See treatment diary below      Assessment: Tolerated treatment well  Pt tolerated all treatment well  Pt reported improved ability to rotate the head to the L post treatment  HEP progressed  We will continue to progress as tolerated  Patient would benefit from continued PT      Plan: Continue per plan of care  Precautions: MVP, anxiety    Pt 1:1 from  Manuals 10/3 10/5 10/10 10/12 10/17 10/19       IASTM c/s b/l   RK RK RK RK       SOR   RK RK RK + TpR L UT RK + L cervical rotation mobs  Neuro Re-Ed             Band rows  gtb 2x10 btb 2x10 gtb 2x10 gtb 2x10 btb 2x10       Band ext  gtb 2x10 btb 2x10 gtb 2x10 gtb 2x10 btb 2x10       No monies  rtb 2x10 rtb 2x10  rtb 3" x20 rtb 3" x20       Supine chin tucks  x10 3" 2x10 5" 2x10 5" 2x10 5" 2x10 5"       Seated chin tucks    5" 2x10 2x10 5" 2x10 5"                                 Ther Ex             Cervical AROM x10 ea  x10 ea  x10 ea  x10 ea  x10 ea  x10 ea  UBE  5' retro 5' retro          Wall slides  5-10"x10 5-10"x10          UT stretch 10"x5 ea  10"x5 ea  10"x5 ea  10"x5 ea  10"x5 ea  10"x5 ea         SNAG      3" x10 L rotation                                              Ther Activity                                       Gait Training                                       Modalities

## 2022-10-24 ENCOUNTER — OFFICE VISIT (OUTPATIENT)
Dept: PHYSICAL THERAPY | Facility: MEDICAL CENTER | Age: 62
End: 2022-10-24
Payer: COMMERCIAL

## 2022-10-24 DIAGNOSIS — M62.89 MUSCLE TIGHTNESS: ICD-10-CM

## 2022-10-24 DIAGNOSIS — M54.2 NECK PAIN: Primary | ICD-10-CM

## 2022-10-24 PROCEDURE — 97140 MANUAL THERAPY 1/> REGIONS: CPT | Performed by: PHYSICAL THERAPIST

## 2022-10-24 NOTE — PROGRESS NOTES
Daily Note     Today's date: 10/24/2022  Patient name: Rebecca Summers  : 1960  MRN: [id number]  Referring provider: Matt Rivera DO  Dx:   Encounter Diagnosis     ICD-10-CM    1  Neck pain  M54 2    2  Muscle tightness  M62 89        Start Time: 1706  Stop Time: 0910  Total time in clinic (min): 39 minutes    Subjective: pt reports that she is asymptomatic upon arrival        Objective: See treatment diary below      Assessment: Tolerated treatment well  Pt tolerated manual therapy well with no increase in symptoms  All exercises completed with good form and tolerance  We will continue to monitor and progress as tolerated  Patient would benefit from continued PT      Plan: Continue per plan of care  Precautions: MVP, anxiety    Pt 1:1 from 665-210  Manuals 10/3 10/5 10/10 10/12 10/17 10/19 10/24      IASTM c/s b/l   RK RK RK RK RK      SOR   RK RK RK + TpR L UT RK + L cervical rotation mobs  RK + L cervical rotation mobs                                Neuro Re-Ed             Band rows  gtb 2x10 btb 2x10 gtb 2x10 gtb 2x10 btb 2x10 btb x20      Band ext  gtb 2x10 btb 2x10 gtb 2x10 gtb 2x10 btb 2x10 btb x20      No monies  rtb 2x10 rtb 2x10  rtb 3" x20 rtb 3" x20 rtb 3" x20      Supine chin tucks  x10 3" 2x10 5" 2x10 5" 2x10 5" 2x10 5" 2x10 5"      Seated chin tucks    5" 2x10 2x10 5" 2x10 5" 2x10 5"                                Ther Ex             Cervical AROM x10 ea  x10 ea  x10 ea  x10 ea  x10 ea  x10 ea  x10 ea  UBE  5' retro 5' retro          Wall slides  5-10"x10 5-10"x10          UT stretch 10"x5 ea  10"x5 ea  10"x5 ea  10"x5 ea  10"x5 ea  10"x5 ea  10"x5 ea        SNAG      3" x10 L rotation 3" x10 L rotation                                             Ther Activity                                       Gait Training                                       Modalities

## 2022-10-26 ENCOUNTER — OFFICE VISIT (OUTPATIENT)
Dept: PHYSICAL THERAPY | Facility: MEDICAL CENTER | Age: 62
End: 2022-10-26
Payer: COMMERCIAL

## 2022-10-26 DIAGNOSIS — M54.2 NECK PAIN: Primary | ICD-10-CM

## 2022-10-26 DIAGNOSIS — M62.89 MUSCLE TIGHTNESS: ICD-10-CM

## 2022-10-26 PROCEDURE — 97140 MANUAL THERAPY 1/> REGIONS: CPT | Performed by: PHYSICAL THERAPIST

## 2022-10-26 PROCEDURE — 97112 NEUROMUSCULAR REEDUCATION: CPT | Performed by: PHYSICAL THERAPIST

## 2022-10-26 PROCEDURE — 97110 THERAPEUTIC EXERCISES: CPT | Performed by: PHYSICAL THERAPIST

## 2022-10-26 NOTE — PROGRESS NOTES
Daily Note     Today's date: 10/26/2022  Patient name: Carrol Enriquez  : 1960  MRN: [id number]  Referring provider: Shanika Quintero DO  Dx:   Encounter Diagnosis     ICD-10-CM    1  Neck pain  M54 2    2  Muscle tightness  M62 89        Start Time: 234  Stop Time: 1053  Total time in clinic (min): 48 minutes    Subjective: pt reports that her cervical symptoms are resolved  She has some discomfort in the tip of the shoulder dave on the L side  Objective: See treatment diary below      Assessment: Tolerated treatment well  Manual therapy tolerated well to cervical spine and UTs with addition of LS  Additional stretches and postural strengthening exercises were completed and tolerated well  Patient would benefit from continued PT      Plan: Continue per plan of care  Precautions: MVP, anxiety    Pt 1:1 from  Manuals 10/3 10/5 10/10 10/12 10/17 10/19 10/24 10/26     IASTM c/s b/l   RK RK RK RK RK RK     SOR   RK RK RK + TpR L UT RK + L cervical rotation mobs  RK + L cervical rotation mobs RK+ TrP release UTs and L/S                               Neuro Re-Ed             Band rows  gtb 2x10 btb 2x10 gtb 2x10 gtb 2x10 btb 2x10 btb x20 btb x20     Band ext  gtb 2x10 btb 2x10 gtb 2x10 gtb 2x10 btb 2x10 btb x20 btb x20     No monies  rtb 2x10 rtb 2x10  rtb 3" x20 rtb 3" x20 rtb 3" x20 gtb 3" x20     Supine chin tucks  x10 3" 2x10 5" 2x10 5" 2x10 5" 2x10 5" 2x10 5"      Seated chin tucks    5" 2x10 2x10 5" 2x10 5" 2x10 5" 2x10 5"                               Ther Ex             Cervical AROM x10 ea  x10 ea  x10 ea  x10 ea  x10 ea  x10 ea  x10 ea  x10 ea  UBE  5' retro 5' retro     5; retro     Wall slides  5-10"x10 5-10"x10          UT stretch 10"x5 ea  10"x5 ea  10"x5 ea  10"x5 ea  10"x5 ea  10"x5 ea  10"x5 ea   10"x5 ea  + L/S     SNAG      3" x10 L rotation 3" x10 L rotation                                             Ther Activity                                       Gait Training Modalities

## 2022-10-31 ENCOUNTER — APPOINTMENT (OUTPATIENT)
Dept: PHYSICAL THERAPY | Facility: MEDICAL CENTER | Age: 62
End: 2022-10-31

## 2022-11-02 NOTE — PROGRESS NOTES
Assessment and Plan:   Patient is a 70-year-old female who presents for rheumatology consult regarding +MITA and hand arthralgia  She was reporting pain at the base of her bilateral thumbs and autoimmune workup was checked which showed a +MITA  We discussed this is very nonspecific and can be seen in the general population  Discussion with her does not reveal concerns for a specific autoimmune disease  Her exam is also unremarkable today in the office  We discussed that we will do further workup to rule out more specific markers of autoimmune disease and she was in agreement with that plan  If her workup is negative she will not need additional follow-up  Plan:  Diagnoses and all orders for this visit:    MITA positive  -     Anti-DNA antibody, double-stranded  -     Anti-Miley 1 Antibody  -     Anti-scleroderma antibody  -     C3 complement  -     C4 complement  -     Centromere Antibody  -     Cyclic citrul peptide antibody, IgG  -     Sjogren's Antibodies  -     Sedimentation rate, automated  -     Nuclear antigen antibody    Polyarthralgia  -     Anti-DNA antibody, double-stranded  -     Anti-Miley 1 Antibody  -     Anti-scleroderma antibody  -     C3 complement  -     C4 complement  -     Centromere Antibody  -     Cyclic citrul peptide antibody, IgG  -     Sjogren's Antibodies  -     Sedimentation rate, automated  -     Nuclear antigen antibody    Positive MITA (antinuclear antibody)  -     Ambulatory Referral to Rheumatology    Arthralgia of hand, unspecified laterality  -     Ambulatory Referral to Rheumatology        Follow-up plan: no rheumatology follow-up needed if work-up negative       JEWEL Harrison is a 58 y o   female with anxiety, headaches,  who presents for rheumatology consult by request of Dr Heydi Hawthorne for polyarthralgia, +MITA  Never saw Rheumatology    Reports that the MITA was checked when she was complaining of pain at her bilateral thumbs to her family doctor and she checked some labs which showed the +MITA  At that time she recalls at her job she was doing a recurrent rolling motion with her wrists and thumb and feels it was related to overuse  She is now at a different job in this has improved although just hurts mildly sometimes  She sometimes has right lateral hip and lower back pain and does have a history of sciatica  Aside from this, she denies any other joint pain, morning stiffness or swelling  No photosensitivity, rashes, psoriasis, sicca symptoms, oral or nasal ulcers, alopecia, Raynaud's, h/o pericarditis or pleurisy, h/o blood clots  Review of Systems  Review of Systems   Constitutional: Negative for fatigue  HENT: Negative for mouth sores  Respiratory: Negative for cough and shortness of breath  Cardiovascular: Negative for chest pain and leg swelling  Gastrointestinal: Negative for abdominal pain, constipation and diarrhea  Musculoskeletal: Positive for arthralgias and back pain  Negative for joint swelling and myalgias  Skin: Negative for color change and rash  Neurological: Negative for weakness  Hematological: Negative for adenopathy  Psychiatric/Behavioral: Negative for sleep disturbance         Allergies  No Known Allergies    Home Medications    Current Outpatient Medications:   •  ALPRAZolam (XANAX) 0 25 mg tablet, Take 0 25 mg by mouth daily as needed, Disp: , Rfl: 0  •  atenolol (TENORMIN) 25 mg tablet, TAKE 1 TABLET (25 MG TOTAL) BY MOUTH IN THE MORNING , Disp: 90 tablet, Rfl: 0  •  citalopram (CeleXA) 10 mg tablet, TAKE 1 TABLET (10 MG TOTAL) BY MOUTH IN THE MORNING , Disp: 90 tablet, Rfl: 0  •  glucosamine-chondroitin 500-400 MG tablet, Take 1 tablet by mouth in the morning , Disp: , Rfl:   •  loratadine (CLARITIN) 10 mg tablet, Take 10 mg by mouth daily, Disp: , Rfl:   •  Omega-3 Fatty Acids (Omega-3 Fish Oil) 500 MG CAPS, Take 600 mg by mouth in the morning, Disp: , Rfl:     Past Medical History  Past Medical History:   Diagnosis Date • Anxiety    • Arthritis    • Fibroids    • GERD (gastroesophageal reflux disease)    • Heart disease    • Varicella        Past Surgical History   Past Surgical History:   Procedure Laterality Date   • CARDIAC CATHETERIZATION     • DILATION AND EVACUATION     • TONSILECTOMY AND ADNOIDECTOMY     • TUBAL LIGATION         Family History  No known family history of autoimmune or inflammatory diseases  Family History   Problem Relation Age of Onset   • Breast cancer Family    • Colon cancer Family    • Breast cancer Mother    • Colon cancer Father    • Ovarian cancer Neg Hx        Social History  Social History     Substance and Sexual Activity   Alcohol Use Yes    Comment: socially      Social History     Substance and Sexual Activity   Drug Use Never     Social History     Tobacco Use   Smoking Status Former Smoker   Smokeless Tobacco Never Used       Objective:    Vitals:    11/03/22 1344   BP: 136/82   Pulse: 81   SpO2: 91%   Weight: 63 kg (139 lb)   Height: 5' 2" (1 575 m)       Physical Exam  Constitutional:       General: She is not in acute distress  HENT:      Head: Normocephalic and atraumatic  Eyes:      Conjunctiva/sclera: Conjunctivae normal    Pulmonary:      Effort: Pulmonary effort is normal  No respiratory distress  Musculoskeletal:      Cervical back: Neck supple  Comments: No joint swelling or synovitis anywhere  No reproducible soft tissue or joint tenderness  Skin:     Coloration: Skin is not pale  Neurological:      Mental Status: She is alert  Mental status is at baseline  Psychiatric:         Mood and Affect: Mood normal          Behavior: Behavior normal          Imaging:   XR R hand 6/2021:  Images personally reviewed in PACS and my impression is:  +OA, No erosive or inflammatory changes noted      XR L shoulder 6/2022:  Images personally reviewed in PACS and my impression is:  Grossly normal, no significant degenerative changes      Labs:   Component      Latest Ref Rng & Units 6/7/2022   WBC      4 31 - 10 16 Thousand/uL 5 99   Red Blood Cell Count      3 81 - 5 12 Million/uL 4 67   Hemoglobin      11 5 - 15 4 g/dL 13 5   HCT      34 8 - 46 1 % 41 3   MCV      82 - 98 fL 88   MCH      26 8 - 34 3 pg 28 9   MCHC      31 4 - 37 4 g/dL 32 7   RDW      11 6 - 15 1 % 13 6   MPV      8 9 - 12 7 fL 11 7   Platelet Count      008 - 390 Thousands/uL 302   nRBC      /100 WBCs 0   Neutrophils %      43 - 75 % 67   Immat GRANS %      0 - 2 % 0   Lymphocytes Relative      14 - 44 % 23   Monocytes Relative      4 - 12 % 6   Eosinophils      0 - 6 % 3   Basophils Relative      0 - 1 % 1   Absolute Neutrophils      1 85 - 7 62 Thousands/µL 3 98   Immature Grans Absolute      0 00 - 0 20 Thousand/uL 0 02   Lymphocytes Absolute      0 60 - 4 47 Thousands/µL 1 40   Absolute Monocytes      0 17 - 1 22 Thousand/µL 0 38   Absolute Eosinophils      0 00 - 0 61 Thousand/µL 0 17   Basophils Absolute      0 00 - 0 10 Thousands/µL 0 04   Sodium      136 - 145 mmol/L 139   Potassium      3 5 - 5 3 mmol/L 3 8   Chloride      100 - 108 mmol/L 106   CO2      21 - 32 mmol/L 27   Anion Gap      4 - 13 mmol/L 6   BUN      5 - 25 mg/dL 14   Creatinine      0 60 - 1 30 mg/dL 0 70   GLUCOSE FASTING      65 - 99 mg/dL 103 (H)   Calcium      8 3 - 10 1 mg/dL 9 4   AST      5 - 45 U/L 18   ALT      12 - 78 U/L 36   Alkaline Phosphatase      46 - 116 U/L 74   Total Protein      6 4 - 8 2 g/dL 7 4   Albumin      3 5 - 5 0 g/dL 3 8   TOTAL BILIRUBIN      0 20 - 1 00 mg/dL 0 69   eGFR      ml/min/1 73sq m 93   Color, UA       Yellow   Clarity, UA       Clear   SL AMB SPECIFIC GRAVITY-URINE      1 003 - 1 030 1 013   pH, UA      4 5, 5 0, 5 5, 6 0, 6 5, 7 0, 7 5, 8 0 6 0   Leukocytes, UA      Negative Negative   Nitrite, UA      Negative Negative   POCT URINE PROTEIN      Negative mg/dl Negative   Glucose, UA      Negative mg/dl Negative   Ketones, UA      Negative mg/dl Negative   Urobilinogen, UA      <2 0 mg/dl mg/dl <2 0 Bilirubin, UA      Negative Negative   Blood, UA      Negative Negative   Speckled Pattern       1:320 (H)   Note       Comment   C-REACTIVE PROTEIN      <3 0 mg/L <3 0   Antinuclear Antibodies, IFA       Positive (A)   RHEUMATOID FACTOR      Negative Negative   HEPATITIS C ANTIBODY      Non-reactive Non-reactive   HIV-1/2 AB-AG      Non-Reactive Non-Reactive

## 2022-11-03 ENCOUNTER — OFFICE VISIT (OUTPATIENT)
Dept: RHEUMATOLOGY | Facility: CLINIC | Age: 62
End: 2022-11-03

## 2022-11-03 VITALS
SYSTOLIC BLOOD PRESSURE: 136 MMHG | HEART RATE: 81 BPM | OXYGEN SATURATION: 91 % | BODY MASS INDEX: 25.58 KG/M2 | WEIGHT: 139 LBS | DIASTOLIC BLOOD PRESSURE: 82 MMHG | HEIGHT: 62 IN

## 2022-11-03 DIAGNOSIS — R76.8 POSITIVE ANA (ANTINUCLEAR ANTIBODY): ICD-10-CM

## 2022-11-03 DIAGNOSIS — M25.549 ARTHRALGIA OF HAND, UNSPECIFIED LATERALITY: ICD-10-CM

## 2022-11-03 DIAGNOSIS — M25.50 POLYARTHRALGIA: ICD-10-CM

## 2022-11-03 DIAGNOSIS — R76.8 ANA POSITIVE: Primary | ICD-10-CM

## 2022-11-08 ENCOUNTER — OFFICE VISIT (OUTPATIENT)
Dept: FAMILY MEDICINE CLINIC | Facility: MEDICAL CENTER | Age: 62
End: 2022-11-08

## 2022-11-08 ENCOUNTER — HOSPITAL ENCOUNTER (OUTPATIENT)
Dept: MRI IMAGING | Facility: HOSPITAL | Age: 62
Discharge: HOME | End: 2022-11-08

## 2022-11-08 VITALS
HEART RATE: 66 BPM | WEIGHT: 135 LBS | RESPIRATION RATE: 16 BRPM | HEIGHT: 62 IN | DIASTOLIC BLOOD PRESSURE: 78 MMHG | TEMPERATURE: 97.6 F | BODY MASS INDEX: 24.84 KG/M2 | OXYGEN SATURATION: 98 % | SYSTOLIC BLOOD PRESSURE: 116 MMHG

## 2022-11-08 DIAGNOSIS — E78.00 ELEVATED CHOLESTEROL: ICD-10-CM

## 2022-11-08 DIAGNOSIS — R51.9 NEW ONSET OF HEADACHES AFTER AGE 50: ICD-10-CM

## 2022-11-08 DIAGNOSIS — F40.243 FEAR OF FLYING: ICD-10-CM

## 2022-11-08 DIAGNOSIS — M54.2 NECK PAIN: ICD-10-CM

## 2022-11-08 DIAGNOSIS — Z79.899 CURRENT USE OF BETA BLOCKER: ICD-10-CM

## 2022-11-08 DIAGNOSIS — R76.8 POSITIVE ANA (ANTINUCLEAR ANTIBODY): ICD-10-CM

## 2022-11-08 DIAGNOSIS — J30.2 SEASONAL ALLERGIES: ICD-10-CM

## 2022-11-08 DIAGNOSIS — Z00.00 HEALTHCARE MAINTENANCE: ICD-10-CM

## 2022-11-08 DIAGNOSIS — F41.9 ANXIETY: ICD-10-CM

## 2022-11-08 DIAGNOSIS — I49.3 PVC'S (PREMATURE VENTRICULAR CONTRACTIONS): ICD-10-CM

## 2022-11-08 DIAGNOSIS — I34.1 MITRAL VALVE PROLAPSE: ICD-10-CM

## 2022-11-08 DIAGNOSIS — Z09 FOLLOW-UP EXAM: Primary | ICD-10-CM

## 2022-11-08 DIAGNOSIS — Z23 ENCOUNTER FOR IMMUNIZATION: ICD-10-CM

## 2022-11-08 RX ORDER — ATENOLOL 25 MG/1
25 TABLET ORAL DAILY
Qty: 90 TABLET | Refills: 1 | Status: SHIPPED | OUTPATIENT
Start: 2022-11-08

## 2022-11-08 RX ORDER — CITALOPRAM 10 MG/1
10 TABLET ORAL DAILY
Qty: 90 TABLET | Refills: 1 | Status: SHIPPED | OUTPATIENT
Start: 2022-11-08

## 2022-11-08 RX ORDER — CETIRIZINE HYDROCHLORIDE 10 MG/1
10 TABLET ORAL DAILY
COMMUNITY

## 2022-11-08 RX ADMIN — GADOBUTROL 6 ML: 604.72 INJECTION INTRAVENOUS at 06:45

## 2022-11-08 NOTE — PROGRESS NOTES
[address] [facility] - Clinic Note  Hemant Kerr, 22     Jelena Carlisle MRN: [id number] : 1960 Age: 58 y o  Assessment/Plan     1  Follow-up exam    - patient presents in follow-up today  - she will return in 6 months, will complete annual physical at that time, a few preventative care measures were also addressed at today's office visit  - counseled about ABCDEs of melanoma and appropriate sunscreen use/sun protection    2  Encounter for immunization    - influenza vaccine, quadrivalent, recombinant, PF, 0 5 mL, for patients 18 yr+ (FLUBLOK)    3  Mitral valve prolapse    - followed at [facility] several years ago    4  PVC's (premature ventricular contractions)    - atenolol (TENORMIN) 25 mg tablet; Take 1 tablet (25 mg total) by mouth daily  Dispense: 90 tablet; Refill: 1    5  Current use of beta blocker    - atenolol (TENORMIN) 25 mg tablet; Take 1 tablet (25 mg total) by mouth daily  Dispense: 90 tablet; Refill: 1    6  Anxiety    - stable, continue current management  - citalopram (CeleXA) 10 mg tablet; Take 1 tablet (10 mg total) by mouth daily  Dispense: 90 tablet; Refill: 1    7  Fear of flying    - uses Xanax p r n  for fear of flying or if for example today had MRI    8  Elevated cholesterol    - I have reviewed pertinent labs:  Lipid Profile:   Lab Results   Component Value Date    CHOLESTEROL 227 (H) 2022    HDL 56 2022    TRIG 107 2022    LDLCALC 150 (H) 2022   - patient has made lifestyle changes, will follow-up repeat lipid panel in 6 months  - Lipid Panel with Direct LDL reflex; Future    9  Seasonal allergies    - Zyrtec 10 mg p o  daily p r n   - advised about Flonase    10  Positive MITA (antinuclear antibody)    - had recent consultation with Rheumatology, further lab workup testing ordered    11  Neck pain    - good response with physical therapy    12   Healthcare maintenance    - established with gynecology for well-woman care, appointment in February  - advised patient about COVID-19 bivalent vaccine to complete at pharmacy  - patient return to office for Shingrix vaccine due to potential symptoms after receiving vaccine, she will schedule accordingly   - colonoscopy up to date   - patient also advised by Tdap vaccine, will receive at next office visit she states    Elizabeth Armendariz acknowledged understanding of treatment plan, all questions answered  Subjective      Elizabeth Armendariz is a 58 y o  female who presents for follow-up neck pain and headaches  She has been attending physical therapy  Patient states that initially she did not experience as much of a response with physical therapy however recently has noticed significant improvement  Patient states she remains active walking about 3 miles per day  Patient did complete MRI brain today for headache workup  She has no acute complaints today      The following portions of the patient's history were reviewed and updated as appropriate: allergies, current medications, past family history, past medical history, past social history, past surgical history and problem list      Past Medical History:   Diagnosis Date   • Anxiety    • Arthritis    • Fibroids    • GERD (gastroesophageal reflux disease)    • Heart disease    • Varicella        No Known Allergies    Past Surgical History:   Procedure Laterality Date   • CARDIAC CATHETERIZATION     • DILATION AND EVACUATION     • TONSILECTOMY AND ADNOIDECTOMY     • TUBAL LIGATION         Family History   Problem Relation Age of Onset   • Breast cancer Family    • Colon cancer Family    • Breast cancer Mother    • Colon cancer Father    • Ovarian cancer Neg Hx        Social History     Socioeconomic History   • Marital status: /Civil Union     Spouse name: None   • Number of children: None   • Years of education: None   • Highest education level: None   Occupational History   • None   Tobacco Use   • Smoking status: Former Smoker   • Smokeless tobacco: Never Used   Vaping Use   • Vaping Use: Never used   Substance and Sexual Activity   • Alcohol use: Yes     Comment: socially    • Drug use: Never   • Sexual activity: Yes     Partners: Male     Birth control/protection: Post-menopausal   Other Topics Concern   • None   Social History Narrative   • None     Social Determinants of Health     Financial Resource Strain: Not on file   Food Insecurity: Not on file   Transportation Needs: Not on file   Physical Activity: Not on file   Stress: Not on file   Social Connections: Not on file   Intimate Partner Violence: Not on file   Housing Stability: Not on file       Current Outpatient Medications   Medication Sig Dispense Refill   • ALPRAZolam (XANAX) 0 25 mg tablet Take 0 25 mg by mouth daily as needed  0   • atenolol (TENORMIN) 25 mg tablet Take 1 tablet (25 mg total) by mouth daily 90 tablet 1   • cetirizine (ZyrTEC) 10 mg tablet Take 10 mg by mouth daily     • citalopram (CeleXA) 10 mg tablet Take 1 tablet (10 mg total) by mouth daily 90 tablet 1   • glucosamine-chondroitin 500-400 MG tablet Take 1 tablet by mouth in the morning  • Omega-3 Fatty Acids (Omega-3 Fish Oil) 500 MG CAPS Take 600 mg by mouth in the morning       No current facility-administered medications for this visit  Review of Systems     As noted in HPI    Objective      /78 (BP Location: Left arm, Patient Position: Sitting, Cuff Size: Adult)   Pulse 66   Temp 97 6 °F (36 4 °C)   Resp 16   Ht 5' 2" (1 575 m)   Wt 61 2 kg (135 lb)   SpO2 98%   BMI 24 69 kg/m²     Physical Exam  Vitals reviewed  Constitutional:       General: She is not in acute distress  Appearance: Normal appearance  HENT:      Head: Normocephalic and atraumatic  Right Ear: Tympanic membrane, ear canal and external ear normal       Left Ear: No drainage  A middle ear effusion is present  Tympanic membrane is not erythematous  Nose: Congestion present        Mouth/Throat: Mouth: Mucous membranes are moist       Pharynx: Oropharynx is clear  Eyes:      General:         Right eye: No discharge  Left eye: No discharge  Extraocular Movements: Extraocular movements intact  Conjunctiva/sclera: Conjunctivae normal       Pupils: Pupils are equal, round, and reactive to light  Cardiovascular:      Rate and Rhythm: Normal rate and regular rhythm  Pulses: Normal pulses  Heart sounds: Normal heart sounds  Pulmonary:      Effort: Pulmonary effort is normal       Breath sounds: Normal breath sounds  Abdominal:      General: Abdomen is flat  Bowel sounds are normal       Palpations: Abdomen is soft  Tenderness: There is no abdominal tenderness  Musculoskeletal:      Cervical back: Neck supple  Decreased range of motion  Right lower leg: No edema  Left lower leg: No edema  Skin:     General: Skin is warm and dry  Neurological:      Mental Status: She is alert and oriented to person, place, and time  Psychiatric:         Mood and Affect: Mood normal          Behavior: Behavior normal          Thought Content: Thought content normal          Judgment: Judgment normal              Some portions of this record may have been generated with voice recognition software  There may be translation, syntax, or grammatical errors  Occasional wrong word or "sound-a-like" substitutions may have occurred due to the inherent limitations of the voice recognition software  Read the chart carefully and recognize, using context, where substations may have occurred  If you have any questions, please contact the dictating provider for clarification or correction, as needed

## 2022-11-11 ENCOUNTER — APPOINTMENT (OUTPATIENT)
Dept: LAB | Facility: MEDICAL CENTER | Age: 62
End: 2022-11-11

## 2022-11-11 LAB
C3 SERPL-MCNC: 108 MG/DL (ref 90–180)
C4 SERPL-MCNC: 30 MG/DL (ref 10–40)
ERYTHROCYTE [SEDIMENTATION RATE] IN BLOOD: 5 MM/HOUR (ref 0–29)

## 2022-11-12 LAB
CENTROMERE B AB SER-ACNC: <0.2 AI (ref 0–0.9)
DSDNA AB SER-ACNC: 1 IU/ML (ref 0–9)
ENA JO1 AB SER-ACNC: <0.2 AI (ref 0–0.9)
ENA RNP AB SER-ACNC: <0.2 AI (ref 0–0.9)
ENA SCL70 AB SER-ACNC: <0.2 AI (ref 0–0.9)
ENA SM AB SER-ACNC: <0.2 AI (ref 0–0.9)
ENA SS-A AB SER-ACNC: 0.2 AI (ref 0–0.9)
ENA SS-B AB SER-ACNC: <0.2 AI (ref 0–0.9)

## 2022-11-15 LAB — CCP AB SER IA-ACNC: 0.8

## 2023-02-01 ENCOUNTER — ANNUAL EXAM (OUTPATIENT)
Dept: OBGYN CLINIC | Facility: CLINIC | Age: 63
End: 2023-02-01

## 2023-02-01 VITALS
WEIGHT: 138.4 LBS | SYSTOLIC BLOOD PRESSURE: 104 MMHG | DIASTOLIC BLOOD PRESSURE: 54 MMHG | BODY MASS INDEX: 25.47 KG/M2 | HEIGHT: 62 IN

## 2023-02-01 DIAGNOSIS — Z01.419 ENCOUNTER FOR GYNECOLOGICAL EXAMINATION: Primary | ICD-10-CM

## 2023-02-01 DIAGNOSIS — Z12.31 ENCOUNTER FOR SCREENING MAMMOGRAM FOR BREAST CANCER: ICD-10-CM

## 2023-02-01 PROBLEM — N95.0 POSTMENOPAUSAL BLEEDING: Status: RESOLVED | Noted: 2019-06-18 | Resolved: 2023-02-01

## 2023-02-01 PROBLEM — N93.0 POSTCOITAL BLEEDING: Status: RESOLVED | Noted: 2021-04-16 | Resolved: 2023-02-01

## 2023-02-01 NOTE — PROGRESS NOTES
Assessment/Plan:    Encounter for gynecological examination  Pap/HPV current  Mammogram ordered  Colonoscopy current    Encourage healthy diet, exercise, Calcium 1200mg per day and at least 800 iu Vitamin D daily  Diagnoses and all orders for this visit:    Encounter for gynecological examination    Encounter for screening mammogram for breast cancer  -     Mammo screening bilateral w 3d & cad; Future          Subjective:      Patient ID: Marine Sharma is a 58 y o  female  Patient presents for a routine annual visit  Menarche- 12Y/O  Last Pap Smear- 21 neg/neg    Mammogram- 22  Colonoscopy-10/19/21 recall 5 years    Non smoker  Social drinker  Currently sexually active  Mother with breast cancer  Father with colon cancer    Hot flashes are still an issue  Able to manage  Not interested in treatment at this time  Gynecologic Exam  She reports no genital itching, genital lesions, genital odor, genital rash, pelvic pain, vaginal bleeding or vaginal discharge  Pertinent negatives include no chills, constipation, diarrhea, fever, nausea, sore throat or vomiting  The following portions of the patient's history were reviewed and updated as appropriate:   She  has a past medical history of Anxiety, Arthritis, Fibroids, GERD (gastroesophageal reflux disease), Heart disease, and Varicella  She   Patient Active Problem List    Diagnosis Date Noted   • Encounter for gynecological examination 2023   • PVC's (premature ventricular contractions) 2022   • Positive MITA (antinuclear antibody) 2022   • Neck pain 2022   • Anxiety 2022   • Fear of flying 2022   • Seasonal allergies 2022   • Uterine leiomyoma 11/10/2015   • Mitral valve prolapse 2015   • SI (sacroiliac) pain 2015     She  has a past surgical history that includes TONSILECTOMY AND ADNOIDECTOMY; Tubal ligation; Cardiac catheterization; and Dilation and evacuation    Her family history includes Breast cancer in her family and mother; Colon cancer in her family and father  She  reports that she has quit smoking  She has never used smokeless tobacco  She reports current alcohol use  She reports that she does not use drugs  Current Outpatient Medications   Medication Sig Dispense Refill   • ALPRAZolam (XANAX) 0 25 mg tablet Take 0 25 mg by mouth daily as needed  0   • atenolol (TENORMIN) 25 mg tablet Take 1 tablet (25 mg total) by mouth daily 90 tablet 1   • cetirizine (ZyrTEC) 10 mg tablet Take 10 mg by mouth daily     • citalopram (CeleXA) 10 mg tablet Take 1 tablet (10 mg total) by mouth daily 90 tablet 1   • glucosamine-chondroitin 500-400 MG tablet Take 1 tablet by mouth in the morning  • Omega-3 Fatty Acids (Omega-3 Fish Oil) 500 MG CAPS Take 600 mg by mouth in the morning       No current facility-administered medications for this visit  Current Outpatient Medications on File Prior to Visit   Medication Sig   • ALPRAZolam (XANAX) 0 25 mg tablet Take 0 25 mg by mouth daily as needed   • atenolol (TENORMIN) 25 mg tablet Take 1 tablet (25 mg total) by mouth daily   • cetirizine (ZyrTEC) 10 mg tablet Take 10 mg by mouth daily   • citalopram (CeleXA) 10 mg tablet Take 1 tablet (10 mg total) by mouth daily   • glucosamine-chondroitin 500-400 MG tablet Take 1 tablet by mouth in the morning  • Omega-3 Fatty Acids (Omega-3 Fish Oil) 500 MG CAPS Take 600 mg by mouth in the morning     No current facility-administered medications on file prior to visit  She has No Known Allergies       Review of Systems   Constitutional: Negative for activity change, appetite change, chills, fatigue and fever  HENT: Negative for rhinorrhea, sneezing and sore throat  Eyes: Negative for visual disturbance  Respiratory: Negative for cough, shortness of breath and wheezing  Cardiovascular: Negative for chest pain, palpitations and leg swelling     Gastrointestinal: Negative for abdominal distention, constipation, diarrhea, nausea and vomiting  Genitourinary: Negative for difficulty urinating, pelvic pain and vaginal discharge  Neurological: Negative for syncope and light-headedness  Objective:      /54 (BP Location: Left arm, Patient Position: Sitting, Cuff Size: Standard)   Ht 5' 2" (1 575 m)   Wt 62 8 kg (138 lb 6 4 oz)   BMI 25 31 kg/m²          Physical Exam  Constitutional:       General: She is not in acute distress  Appearance: She is well-developed  She is not diaphoretic  Chest:   Breasts:     Breasts are symmetrical       Right: No inverted nipple, mass, nipple discharge, skin change or tenderness  Left: No inverted nipple, mass, nipple discharge, skin change or tenderness  Abdominal:      General: Bowel sounds are normal  There is no distension  Palpations: Abdomen is soft  There is no mass  Tenderness: There is no abdominal tenderness  There is no guarding or rebound  Genitourinary:     Labia:         Right: No rash, tenderness, lesion or injury  Left: No rash, tenderness, lesion or injury  Vagina: No vaginal discharge or bleeding  Cervix: No cervical motion tenderness, discharge or friability  Uterus: Not deviated, not enlarged, not fixed and not tender  Adnexa:         Right: No mass, tenderness or fullness  Left: No mass, tenderness or fullness  Comments: Urethral meatus- no lesions, non tender  Urethra non tender  Bladder non tender  Thin, atrophic vaginal mucosa  Skin:     General: Skin is warm and dry  Coloration: Skin is not pale  Findings: No erythema or rash

## 2023-02-08 ENCOUNTER — OFFICE VISIT (OUTPATIENT)
Dept: DERMATOLOGY | Facility: CLINIC | Age: 63
End: 2023-02-08

## 2023-02-08 VITALS — BODY MASS INDEX: 25.4 KG/M2 | WEIGHT: 138 LBS | HEIGHT: 62 IN

## 2023-02-08 DIAGNOSIS — L82.1 SEBORRHEIC KERATOSIS: Primary | ICD-10-CM

## 2023-02-08 DIAGNOSIS — L91.8 SKIN TAG: ICD-10-CM

## 2023-02-08 DIAGNOSIS — Z13.89 SCREENING FOR SKIN CONDITION: ICD-10-CM

## 2023-02-08 NOTE — PROGRESS NOTES
[address]  1 [facility]  [facility] [address]  [phone]  [phone]     MRN: [id number] : 1960  Encounter: [id number]  Patient Information: Za Estes  Chief complaint: Growth on abdomen and overall checkup    History of present illness: 27-year-old female presents for overall skin check concern regarding new lesion that developed on her left abdomen no other concerns noted  Past Medical History:   Diagnosis Date   • Anxiety    • Arthritis    • Fibroids    • GERD (gastroesophageal reflux disease)    • Heart disease    • Varicella      Past Surgical History:   Procedure Laterality Date   • CARDIAC CATHETERIZATION     • DILATION AND EVACUATION     • TONSILECTOMY AND ADNOIDECTOMY     • TUBAL LIGATION       Social History   Social History     Substance and Sexual Activity   Alcohol Use Yes    Comment: socially      Social History     Substance and Sexual Activity   Drug Use Never     Social History     Tobacco Use   Smoking Status Former   Smokeless Tobacco Never     Family History   Problem Relation Age of Onset   • Breast cancer Mother    • Colon cancer Father    • Breast cancer Family    • Colon cancer Family    • Ovarian cancer Neg Hx    • Uterine cancer Neg Hx    • Cervical cancer Neg Hx      Meds/Allergies   No Known Allergies    Meds:  Prior to Admission medications    Medication Sig Start Date End Date Taking? Authorizing Provider   ALPRAZolam [place]) 0 25 mg tablet Take 0 25 mg by mouth daily as needed 7/3/19  Yes Historical Provider, MD   atenolol (TENORMIN) 25 mg tablet Take 1 tablet (25 mg total) by mouth daily 22  Yes Velma Price DO   cetirizine (ZyrTEC) 10 mg tablet Take 10 mg by mouth daily   Yes Historical Provider, MD   citalopram (CeleXA) 10 mg tablet Take 1 tablet (10 mg total) by mouth daily 22  Yes Velma Price DO   glucosamine-chondroitin 500-400 MG tablet Take 1 tablet by mouth in the morning     Yes Historical Provider, MD   Omega-3 Fatty Acids (Omega-3 Fish Oil) 500 MG CAPS Take 600 mg by mouth in the morning   Yes Historical Provider, MD       Subjective:     Review of Systems:    General: negative for - chills, fatigue, fever,  weight gain or weight loss  Psychological: negative for - anxiety, behavioral disorder, concentration difficulties, decreased libido, depression, irritability, memory difficulties, mood swings, sleep disturbances or suicidal ideation  ENT: negative for - hearing difficulties , nasal congestion, nasal discharge, oral lesions, sinus pain, sneezing, sore throat  Allergy and Immunology: negative for - hives, insect bite sensitivity,  Hematological and Lymphatic: negative for - bleeding problems, blood clots,bruising, swollen lymph nodes  Endocrine: negative for - hair pattern changes, hot flashes, malaise/lethargy, mood swings, palpitations, polydipsia/polyuria, skin changes, temperature intolerance or unexpected weight change  Respiratory: negative for - cough, hemoptysis, orthopnea, shortness of breath, or wheezing  Cardiovascular: negative for - chest pain, dyspnea on exertion, edema,  Gastrointestinal: negative for - abdominal pain, nausea/vomiting  Genito-Urinary: negative for - dysuria, incontinence, irregular/heavy menses or urinary frequency/urgency  Musculoskeletal: negative for - gait disturbance, joint pain, joint stiffness, joint swelling, muscle pain, muscular weakness  Dermatological:  As in HPI  Neurological: negative for confusion, dizziness, headaches, impaired coordination/balance, memory loss, numbness/tingling, seizures, speech problems, tremors or weakness       Objective:   Ht 5' 2" (1 575 m)   Wt 62 6 kg (138 lb)   BMI 25 24 kg/m²     Physical Exam:    General Appearance:    Alert, cooperative, no distress   Head:    Normocephalic, without obvious abnormality, atraumatic           Skin:   A full skin exam was performed including scalp, head scalp, eyes, ears, nose, lips, neck, chest, axilla, abdomen, back, buttocks, bilateral upper extremities, bilateral lower extremities, hands, feet, fingers, toes, fingernails, and toenails normal keratotic papules greasy stuck on appearance fleshy papules noted around the eyes nothing else remarkable noted on complete exam       Assessment:     1  Seborrheic keratosis        2  Skin tag        3  Screening for skin condition              Plan:   Seborrheic Keratosis  Patient reasurred these are normal growths we acquire with age no treatment needed  Skin tags advised patient that these are normal growths that we acquire sometimes related to diabetes and weight as well as genetics can be removed but considered cosmetic  Screening for Dermatologic Disorders: Nothing else of concern noted on complete exam follow up in 1 year       Annabella Pierre MD  2/8/2023,10:35 AM    Portions of the record may have been created with voice recognition software   Occasional wrong word or "sound a like" substitutions may have occurred due to the inherent limitations of voice recognition software   Read the chart carefully and recognize, using context, where substitutions have occurred

## 2023-02-08 NOTE — PATIENT INSTRUCTIONS
Seborrheic Keratosis  Patient reasurred these are normal growths we acquire with age no treatment needed    Skin tags advised patient that these are normal growths that we acquire sometimes related to diabetes and weight as well as genetics can be removed but considered cosmetic  Screening for Dermatologic Disorders: Nothing else of concern noted on complete exam follow up in 1 year

## 2023-02-08 NOTE — PROGRESS NOTES
[facility] Note     Patient Name: Christofer Suárez  Encounter Date: February 8, 2023     Have you been cared for by a [facility] Dermatologist in the last 3 years and, if so, which description applies to you? NO  I am considered a "new" patient and must complete all patient intake questions  I am FEMALE/of child-bearing potential     REVIEW OF SYSTEMS:  Have you recently had or currently have any of the following? · Recent fever or chills? No  · Any non-healing wound? No  · Are you pregnant or planning to become pregnant? No  · Are you currently or planning to be nursing or breast feeding? No   PAST MEDICAL HISTORY:  Have you personally ever had or currently have any of the following? If "YES," then please provide more detail  · Skin cancer (such as Melanoma, Basal Cell Carcinoma, Squamous Cell Carcinoma? No  · Tuberculosis, HIV/AIDS, Hepatitis B or C: No  · Systemic Immunosuppression such as Diabetes, Biologic or Immunotherapy, Chemotherapy, Organ Transplantation, Bone Marrow Transplantation No  · Radiation Treatment No   FAMILY HISTORY:  Any "first degree relatives" (parent, brother, sister, or child) with the following? • Skin Cancer, Pancreatic or Other Cancer? YES, Sister - Squamous Cell Carcinoma; Father - Colon Cancer; Mother - Cancerous Tumour in Breast   PATIENT EXPERIENCE:    • Do you want the Dermatologist to perform a COMPLETE skin exam today including a clinical examination under the "bra and underwear" areas? Yes  • If necessary, do we have your permission to call and leave a detailed message on your Preferred Phone number that includes your specific medical information?   Yes      No Known Allergies   Current Outpatient Medications:   •  ALPRAZolam (XANAX) 0 25 mg tablet, Take 0 25 mg by mouth daily as needed, Disp: , Rfl: 0  •  atenolol (TENORMIN) 25 mg tablet, Take 1 tablet (25 mg total) by mouth daily, Disp: 90 tablet, Rfl: 1  •  cetirizine (ZyrTEC) 10 mg tablet, Take 10 mg by mouth daily, Disp: , Rfl:   •  citalopram (CeleXA) 10 mg tablet, Take 1 tablet (10 mg total) by mouth daily, Disp: 90 tablet, Rfl: 1  •  glucosamine-chondroitin 500-400 MG tablet, Take 1 tablet by mouth in the morning , Disp: , Rfl:   •  Omega-3 Fatty Acids (Omega-3 Fish Oil) 500 MG CAPS, Take 600 mg by mouth in the morning, Disp: , Rfl:           • Whom besides the patient is providing clinical information about today's encounter?   o NO ADDITIONAL HISTORIAN (patient alone provided history)    Physical Exam and Assessment/Plan by Diagnosis:

## 2023-04-02 PROBLEM — Z01.419 ENCOUNTER FOR GYNECOLOGICAL EXAMINATION: Status: RESOLVED | Noted: 2023-02-01 | Resolved: 2023-04-02

## 2023-05-04 ENCOUNTER — APPOINTMENT (OUTPATIENT)
Dept: LAB | Facility: MEDICAL CENTER | Age: 63
End: 2023-05-04

## 2023-05-04 DIAGNOSIS — F41.9 ANXIETY: ICD-10-CM

## 2023-05-04 DIAGNOSIS — Z79.899 CURRENT USE OF BETA BLOCKER: ICD-10-CM

## 2023-05-04 RX ORDER — ATENOLOL 25 MG/1
25 TABLET ORAL DAILY
Qty: 90 TABLET | Refills: 1 | Status: SHIPPED | OUTPATIENT
Start: 2023-05-04

## 2023-05-04 RX ORDER — CITALOPRAM 10 MG/1
TABLET ORAL
Qty: 90 TABLET | Refills: 1 | Status: SHIPPED | OUTPATIENT
Start: 2023-05-04

## 2023-05-09 ENCOUNTER — OFFICE VISIT (OUTPATIENT)
Dept: FAMILY MEDICINE CLINIC | Facility: MEDICAL CENTER | Age: 63
End: 2023-05-09

## 2023-05-09 VITALS
WEIGHT: 134 LBS | TEMPERATURE: 97.8 F | DIASTOLIC BLOOD PRESSURE: 64 MMHG | HEIGHT: 62 IN | OXYGEN SATURATION: 98 % | HEART RATE: 58 BPM | RESPIRATION RATE: 16 BRPM | SYSTOLIC BLOOD PRESSURE: 102 MMHG | BODY MASS INDEX: 24.66 KG/M2

## 2023-05-09 DIAGNOSIS — E78.00 ELEVATED LDL CHOLESTEROL LEVEL: ICD-10-CM

## 2023-05-09 DIAGNOSIS — Z00.00 ANNUAL PHYSICAL EXAM: Primary | ICD-10-CM

## 2023-05-09 DIAGNOSIS — I49.3 PVC'S (PREMATURE VENTRICULAR CONTRACTIONS): ICD-10-CM

## 2023-05-09 NOTE — ASSESSMENT & PLAN NOTE
· Pulse 58 today, /64  · Patient does wear a smart watch which does monitor her heart rate  · Advised patient about parameters in which case to contact

## 2023-05-09 NOTE — PROGRESS NOTES
[facility] HEALTH MAINTENANCE OFFICE VISIT  [facility] - [facility] [place]    NAME: Siri Flores  AGE: 58 y o  SEX: female  : 1960     DATE: 2023    Assessment and Plan     1  Annual physical exam    2  PVC's (premature ventricular contractions)  Assessment & Plan:  · Pulse 58 today, /64  · Patient does wear a smart watch which does monitor her heart rate  · Advised patient about parameters in which case to contact      3   Elevated LDL cholesterol level    -Discussed recently resulted repeat lipid panel with patient  -I have reviewed pertinent labs:  Lipid Profile:   Lab Results   Component Value Date    CHOLESTEROL 227 (H) 2023    HDL 66 2023    TRIG 60 2023    LDLCALC 149 (H) 2023     The 10-year ASCVD risk score (Tico YODER, et al , 2019) is: 2 6%    Values used to calculate the score:      Age: 58 years      Sex: Female      Is Non- : No      Diabetic: No      Tobacco smoker: No      Systolic Blood Pressure: 759 mmHg      Is BP treated: No      HDL Cholesterol: 66 mg/dL      Total Cholesterol: 227 mg/dL    · Patient Counseling:   · Nutrition: Stressed importance of a well balanced diet, moderation of sodium/saturated fat, caloric balance and sufficient intake of fiber  · Exercise: Stressed the importance of regular exercise with a goal of 150 minutes per week  · Dental Health: Discussed daily flossing and brushing and regular dental visits   · Alcohol Use:  Recommended moderation of alcohol intake  · Immunizations reviewed:   · Discussed COVID-19 bivalent vaccine  · Counseled patient about tetanus booster she would like to return for nurse visit for this  · Counseled about Shingrix vaccination series, she will plan to complete  · Discussed benefits of:    · Established with gynecology for well woman care, had well woman exam this February  · Mammogram scheduled for August  · Last Pap smear with cotesting 4/19/2021 normal cytology negative HPV  · Last colonoscopy 10/19/2021, recommendation for repeat screening 5 years thereafter  BMI Counseling: Body mass index is 24 51 kg/m²  Discussed with patient's BMI with her  Follow-up in 6 months and sooner as needed    Chief Complaint     Chief Complaint   Patient presents with   • Annual Exam       History of Present Illness     HPI    Well Adult Physical   Patient here for a comprehensive physical exam       Diet and Physical Activity  Diet: well balanced diet  Exercise: daily      Depression Screen  PHQ-2/9 Depression Screening    Little interest or pleasure in doing things: 0 - not at all  Feeling down, depressed, or hopeless: 0 - not at all          General Health  Hearing: Significantly decreased: right, appointment with Audiology upcoming  Vision: goes for regular eye exams and wears contacts  Dental: no dental visits for >1 year, brushes teeth twice daily and flosses teeth occasionally    Reproductive Health  Follows with gynecologist      The following portions of the patient's history were reviewed and updated as appropriate: allergies, current medications, past family history, past medical history, past social history, past surgical history and problem list     Review of Systems     Review of Systems     As noted in HPI     Past Medical History     Past Medical History:   Diagnosis Date   • Anxiety    • Arthritis    • Fibroids    • GERD (gastroesophageal reflux disease)    • Heart disease    • Varicella        Past Surgical History     Past Surgical History:   Procedure Laterality Date   • CARDIAC CATHETERIZATION     • DILATION AND EVACUATION     • TONSILECTOMY AND ADNOIDECTOMY     • TUBAL LIGATION         Social History     Social History     Socioeconomic History   • Marital status: /Civil Union     Spouse name: None   • Number of children: None   • Years of education: None   • Highest education level: None   Occupational History   • None   Tobacco Use "  • Smoking status: Former   • Smokeless tobacco: Never   Vaping Use   • Vaping Use: Never used   Substance and Sexual Activity   • Alcohol use: Yes     Comment: socially    • Drug use: Never   • Sexual activity: Yes     Partners: Male     Birth control/protection: Post-menopausal   Other Topics Concern   • None   Social History Narrative   • None     Social Determinants of Health     Financial Resource Strain: Not on file   Food Insecurity: Not on file   Transportation Needs: Not on file   Physical Activity: Not on file   Stress: Not on file   Social Connections: Not on file   Intimate Partner Violence: Not on file   Housing Stability: Not on file       Family History     Family History   Problem Relation Age of Onset   • Breast cancer Mother    • Colon cancer Father    • Breast cancer Family    • Colon cancer Family    • Ovarian cancer Neg Hx    • Uterine cancer Neg Hx    • Cervical cancer Neg Hx        Current Medications       Current Outpatient Medications:   •  ALPRAZolam (XANAX) 0 25 mg tablet, Take 0 25 mg by mouth daily as needed, Disp: , Rfl: 0  •  atenolol (TENORMIN) 25 mg tablet, TAKE 1 TABLET (25 MG TOTAL) BY MOUTH DAILY  , Disp: 90 tablet, Rfl: 1  •  cetirizine (ZyrTEC) 10 mg tablet, Take 10 mg by mouth daily, Disp: , Rfl:   •  citalopram (CeleXA) 10 mg tablet, TAKE 1 TABLET BY MOUTH EVERY DAY, Disp: 90 tablet, Rfl: 1  •  glucosamine-chondroitin 500-400 MG tablet, Take 1 tablet by mouth in the morning , Disp: , Rfl:   •  Omega-3 Fatty Acids (Omega-3 Fish Oil) 500 MG CAPS, Take 600 mg by mouth in the morning, Disp: , Rfl:      Allergies     No Known Allergies    Objective     /64 (BP Location: Left arm, Patient Position: Sitting, Cuff Size: Adult)   Pulse 58   Temp 97 8 °F (36 6 °C)   Resp 16   Ht 5' 2\" (1 575 m)   Wt 60 8 kg (134 lb)   SpO2 98%   BMI 24 51 kg/m²      Physical Exam  Vitals reviewed  Constitutional:       General: She is not in acute distress       Appearance: Normal " appearance  HENT:      Head: Normocephalic and atraumatic  Right Ear: External ear normal       Left Ear: External ear normal       Nose: Nose normal       Mouth/Throat:      Mouth: Mucous membranes are moist       Pharynx: Oropharynx is clear  Eyes:      Extraocular Movements: Extraocular movements intact  Conjunctiva/sclera: Conjunctivae normal       Pupils: Pupils are equal, round, and reactive to light  Cardiovascular:      Rate and Rhythm: Regular rhythm  Bradycardia present  Pulses: Normal pulses  Heart sounds: Normal heart sounds  Pulmonary:      Effort: Pulmonary effort is normal       Breath sounds: Normal breath sounds  Abdominal:      General: Abdomen is flat  Bowel sounds are normal       Palpations: Abdomen is soft  Tenderness: There is no abdominal tenderness  Musculoskeletal:      Cervical back: Neck supple  Right lower leg: No edema  Left lower leg: No edema  Lymphadenopathy:      Cervical: No cervical adenopathy  Skin:     General: Skin is warm and dry  Neurological:      Mental Status: She is alert and oriented to person, place, and time  Psychiatric:         Mood and Affect: Mood normal          Behavior: Behavior normal          Thought Content:  Thought content normal          Judgment: Judgment normal                Jason Padilla DO  [facility]

## 2023-06-09 ENCOUNTER — CLINICAL SUPPORT (OUTPATIENT)
Dept: FAMILY MEDICINE CLINIC | Facility: MEDICAL CENTER | Age: 63
End: 2023-06-09
Payer: COMMERCIAL

## 2023-06-09 DIAGNOSIS — Z23 IMMUNIZATION DUE: Primary | ICD-10-CM

## 2023-06-09 PROCEDURE — 90715 TDAP VACCINE 7 YRS/> IM: CPT

## 2023-06-09 PROCEDURE — 90471 IMMUNIZATION ADMIN: CPT

## 2023-06-15 ENCOUNTER — HOSPITAL ENCOUNTER (EMERGENCY)
Facility: HOSPITAL | Age: 63
Discharge: HOME | End: 2023-06-16
Attending: EMERGENCY MEDICINE
Payer: COMMERCIAL

## 2023-06-15 DIAGNOSIS — R91.1 PULMONARY NODULE: ICD-10-CM

## 2023-06-15 DIAGNOSIS — L03.313 CELLULITIS OF CHEST WALL: Primary | ICD-10-CM

## 2023-06-15 LAB
ATRIAL RATE: 71 BPM
BASOPHILS # BLD AUTO: 0.06 THOUSANDS/ÂΜL (ref 0–0.1)
BASOPHILS NFR BLD AUTO: 1 % (ref 0–1)
EOSINOPHIL # BLD AUTO: 0.48 THOUSAND/ÂΜL (ref 0–0.61)
EOSINOPHIL NFR BLD AUTO: 7 % (ref 0–6)
ERYTHROCYTE [DISTWIDTH] IN BLOOD BY AUTOMATED COUNT: 13.3 % (ref 11.6–15.1)
HCT VFR BLD AUTO: 38.8 % (ref 34.8–46.1)
HGB BLD-MCNC: 12.8 G/DL (ref 11.5–15.4)
IMM GRANULOCYTES # BLD AUTO: 0.01 THOUSAND/UL (ref 0–0.2)
IMM GRANULOCYTES NFR BLD AUTO: 0 % (ref 0–2)
LYMPHOCYTES # BLD AUTO: 2.06 THOUSANDS/ÂΜL (ref 0.6–4.47)
LYMPHOCYTES NFR BLD AUTO: 29 % (ref 14–44)
MCH RBC QN AUTO: 28.8 PG (ref 26.8–34.3)
MCHC RBC AUTO-ENTMCNC: 33 G/DL (ref 31.4–37.4)
MCV RBC AUTO: 87 FL (ref 82–98)
MONOCYTES # BLD AUTO: 0.53 THOUSAND/ÂΜL (ref 0.17–1.22)
MONOCYTES NFR BLD AUTO: 8 % (ref 4–12)
NEUTROPHILS # BLD AUTO: 3.91 THOUSANDS/ÂΜL (ref 1.85–7.62)
NEUTS SEG NFR BLD AUTO: 55 % (ref 43–75)
NRBC BLD AUTO-RTO: 0 /100 WBCS
P AXIS: 74 DEGREES
PLATELET # BLD AUTO: 233 THOUSANDS/UL (ref 149–390)
PMV BLD AUTO: 11.7 FL (ref 8.9–12.7)
PR INTERVAL: 158 MS
QRS AXIS: 15 DEGREES
QRSD INTERVAL: 142 MS
QT INTERVAL: 436 MS
QTC INTERVAL: 473 MS
RBC # BLD AUTO: 4.45 MILLION/UL (ref 3.81–5.12)
T WAVE AXIS: 62 DEGREES
VENTRICULAR RATE: 71 BPM
WBC # BLD AUTO: 7.05 THOUSAND/UL (ref 4.31–10.16)

## 2023-06-15 PROCEDURE — 36415 COLL VENOUS BLD VENIPUNCTURE: CPT | Performed by: EMERGENCY MEDICINE

## 2023-06-15 PROCEDURE — 93005 ELECTROCARDIOGRAM TRACING: CPT

## 2023-06-15 PROCEDURE — 80053 COMPREHEN METABOLIC PANEL: CPT | Performed by: EMERGENCY MEDICINE

## 2023-06-15 PROCEDURE — 85025 COMPLETE CBC W/AUTO DIFF WBC: CPT | Performed by: EMERGENCY MEDICINE

## 2023-06-16 ENCOUNTER — TELEPHONE (OUTPATIENT)
Dept: FAMILY MEDICINE CLINIC | Facility: MEDICAL CENTER | Age: 63
End: 2023-06-16

## 2023-06-16 ENCOUNTER — APPOINTMENT (EMERGENCY)
Dept: CT IMAGING | Facility: HOSPITAL | Age: 63
End: 2023-06-16
Payer: COMMERCIAL

## 2023-06-16 VITALS
OXYGEN SATURATION: 100 % | HEIGHT: 62 IN | SYSTOLIC BLOOD PRESSURE: 124 MMHG | DIASTOLIC BLOOD PRESSURE: 74 MMHG | WEIGHT: 132 LBS | TEMPERATURE: 97.6 F | RESPIRATION RATE: 16 BRPM | HEART RATE: 53 BPM | BODY MASS INDEX: 24.29 KG/M2

## 2023-06-16 LAB
ALBUMIN SERPL BCP-MCNC: 4.2 G/DL (ref 3.5–5)
ALP SERPL-CCNC: 66 U/L (ref 34–104)
ALT SERPL W P-5'-P-CCNC: 19 U/L (ref 7–52)
ANION GAP SERPL CALCULATED.3IONS-SCNC: 6 MMOL/L (ref 4–13)
AST SERPL W P-5'-P-CCNC: 19 U/L (ref 13–39)
BILIRUB SERPL-MCNC: 0.44 MG/DL (ref 0.2–1)
BUN SERPL-MCNC: 15 MG/DL (ref 5–25)
CALCIUM SERPL-MCNC: 8.9 MG/DL (ref 8.4–10.2)
CHLORIDE SERPL-SCNC: 107 MMOL/L (ref 96–108)
CO2 SERPL-SCNC: 28 MMOL/L (ref 21–32)
CREAT SERPL-MCNC: 0.58 MG/DL (ref 0.6–1.3)
GFR SERPL CREATININE-BSD FRML MDRD: 99 ML/MIN/1.73SQ M
GLUCOSE SERPL-MCNC: 78 MG/DL (ref 65–140)
POTASSIUM SERPL-SCNC: 3.6 MMOL/L (ref 3.5–5.3)
PROT SERPL-MCNC: 6.8 G/DL (ref 6.4–8.4)
SODIUM SERPL-SCNC: 141 MMOL/L (ref 135–147)

## 2023-06-16 PROCEDURE — 71260 CT THORAX DX C+: CPT

## 2023-06-16 PROCEDURE — G1004 CDSM NDSC: HCPCS

## 2023-06-16 PROCEDURE — 74177 CT ABD & PELVIS W/CONTRAST: CPT

## 2023-06-16 RX ORDER — CEPHALEXIN 250 MG/1
500 CAPSULE ORAL ONCE
Status: COMPLETED | OUTPATIENT
Start: 2023-06-16 | End: 2023-06-16

## 2023-06-16 RX ORDER — CEPHALEXIN 250 MG/1
500 CAPSULE ORAL 4 TIMES DAILY
Qty: 56 CAPSULE | Refills: 0 | Status: SHIPPED | OUTPATIENT
Start: 2023-06-16 | End: 2023-06-23

## 2023-06-16 RX ORDER — SULFAMETHOXAZOLE AND TRIMETHOPRIM 800; 160 MG/1; MG/1
1 TABLET ORAL 2 TIMES DAILY
Qty: 14 TABLET | Refills: 0 | Status: SHIPPED | OUTPATIENT
Start: 2023-06-16 | End: 2023-06-23

## 2023-06-16 RX ORDER — SULFAMETHOXAZOLE AND TRIMETHOPRIM 800; 160 MG/1; MG/1
1 TABLET ORAL ONCE
Status: COMPLETED | OUTPATIENT
Start: 2023-06-16 | End: 2023-06-16

## 2023-06-16 RX ADMIN — SULFAMETHOXAZOLE AND TRIMETHOPRIM 1 TABLET: 800; 160 TABLET ORAL at 03:05

## 2023-06-16 RX ADMIN — IOHEXOL 100 ML: 350 INJECTION, SOLUTION INTRAVENOUS at 01:48

## 2023-06-16 RX ADMIN — CEPHALEXIN 500 MG: 250 CAPSULE ORAL at 03:05

## 2023-06-16 NOTE — ED PROVIDER NOTES
History  Chief Complaint   Patient presents with   • Clavicle Swelling     Pt reports that she woke up this morning and her left clavicle was swollen  Now noticing area of swelling, tenderness and redness around left clavicle  Did fall three weeks ago but pain did not start until today  Dnies pain on movement of left arm, just on palpation of swelling       History provided by:  Patient   used: No    80-year-old female presented for evaluation after noticing some tender swelling over her left clavicle this morning  States she had a fall last week onto her chest but denies any pain at the time  No other recent injuries or changes to activities  No fevers or chills  No chest pain or difficulty breathing  No rashes  On exam here she has palpable lymphadenopathy in the left supraclavicular area  Also possible node in the left axilla which is nontender  Otherwise unremarkable exam   Plan imaging of the chest/abdomen/pelvis to rule out underlying mass or other inflammatory process  Prior to Admission Medications   Prescriptions Last Dose Informant Patient Reported? Taking? ALPRAZolam (XANAX) 0 25 mg tablet  Self Yes No   Sig: Take 0 25 mg by mouth daily as needed   Omega-3 Fatty Acids (Omega-3 Fish Oil) 500 MG CAPS  Self Yes No   Sig: Take 600 mg by mouth in the morning   atenolol (TENORMIN) 25 mg tablet   No No   Sig: TAKE 1 TABLET (25 MG TOTAL) BY MOUTH DAILY  cetirizine (ZyrTEC) 10 mg tablet  Self Yes No   Sig: Take 10 mg by mouth daily   citalopram (CeleXA) 10 mg tablet   No No   Sig: TAKE 1 TABLET BY MOUTH EVERY DAY   glucosamine-chondroitin 500-400 MG tablet  Self Yes No   Sig: Take 1 tablet by mouth in the morning        Facility-Administered Medications: None       Past Medical History:   Diagnosis Date   • Anxiety    • Arthritis    • Fibroids    • GERD (gastroesophageal reflux disease)    • Heart disease    • Varicella        Past Surgical History:   Procedure Laterality Date   • CARDIAC CATHETERIZATION     • DILATION AND EVACUATION     • TONSILECTOMY AND ADNOIDECTOMY     • TUBAL LIGATION         Family History   Problem Relation Age of Onset   • Breast cancer Mother    • Colon cancer Father    • Breast cancer Family    • Colon cancer Family    • Ovarian cancer Neg Hx    • Uterine cancer Neg Hx    • Cervical cancer Neg Hx      I have reviewed and agree with the history as documented  E-Cigarette/Vaping   • E-Cigarette Use Never User      E-Cigarette/Vaping Substances   • Nicotine No    • THC No    • CBD No    • Flavoring No    • Other No    • Unknown No      Social History     Tobacco Use   • Smoking status: Former   • Smokeless tobacco: Never   Vaping Use   • Vaping Use: Never used   Substance Use Topics   • Alcohol use: Yes     Comment: socially    • Drug use: Never       Review of Systems   Constitutional: Negative for activity change, appetite change, diaphoresis, fatigue and fever  Respiratory: Negative for chest tightness and shortness of breath  Cardiovascular: Negative for chest pain and leg swelling  Gastrointestinal: Negative for abdominal pain, nausea and vomiting  Musculoskeletal: Negative for back pain and neck pain  Skin: Negative for color change and rash  Hematological: Positive for adenopathy  All other systems reviewed and are negative  Physical Exam  Physical Exam  Vitals and nursing note reviewed  Constitutional:       Appearance: Normal appearance  HENT:      Head: Normocephalic and atraumatic  Cardiovascular:      Rate and Rhythm: Normal rate and regular rhythm  Pulmonary:      Effort: Pulmonary effort is normal       Breath sounds: Normal breath sounds  Comments: Left supraclavicular lymphadenopathy  Possible adenopathy in the left axilla  Abdominal:      General: There is no distension  Palpations: Abdomen is soft  Tenderness: There is no abdominal tenderness  Musculoskeletal:         General: No swelling   Normal range of motion  Cervical back: Normal range of motion and neck supple  Skin:     General: Skin is warm and dry  Capillary Refill: Capillary refill takes less than 2 seconds  Neurological:      General: No focal deficit present  Mental Status: She is alert and oriented to person, place, and time  Psychiatric:         Mood and Affect: Mood normal          Behavior: Behavior normal          Vital Signs  ED Triage Vitals [06/15/23 2138]   Temperature Pulse Respirations Blood Pressure SpO2   97 6 °F (36 4 °C) 64 16 142/71 99 %      Temp Source Heart Rate Source Patient Position - Orthostatic VS BP Location FiO2 (%)   Oral Monitor Sitting Right arm --      Pain Score       --           Vitals:    06/15/23 2138   BP: 142/71   Pulse: 64   Patient Position - Orthostatic VS: Sitting         Visual Acuity      ED Medications  Medications - No data to display    Diagnostic Studies  Results Reviewed     Procedure Component Value Units Date/Time    Comprehensive metabolic panel [[id number]] Collected: 06/15/23 2338    Lab Status: In process Specimen: Blood from Arm, Right Updated: 06/15/23 2340    CBC and differential [[id number]] Collected: 06/15/23 2338    Lab Status: In process Specimen: Blood from Arm, Right Updated: 06/15/23 2340                 CT chest abdomen pelvis w contrast    (Results Pending)              Procedures  Procedures         ED Course                               SBIRT 20yo+    Flowsheet Row Most Recent Value   Initial Alcohol Screen: US AUDIT-C     1  How often do you have a drink containing alcohol? 1 Filed at: 06/15/2023 2337   2  How many drinks containing alcohol do you have on a typical day you are drinking? 0 Filed at: 06/15/2023 2337   3a  Male UNDER 65: How often do you have five or more drinks on one occasion? 0 Filed at: 06/15/2023 2337   3b  FEMALE Any Age, or MALE 65+: How often do you have 4 or more drinks on one occassion?  0 Filed at: 06/15/2023 2337   Audit-C Score 1 Filed at: 06/15/2023 2337   MARCELLE: How many times in the past year have you    Used an illegal drug or used a prescription medication for non-medical reasons? Never Filed at: 06/15/2023 2337                    Medical Decision Making  29-year-old female presented for evaluation after noticing some tender swelling over her left clavicle this morning  On exam here she has palpable lymphadenopathy in the left supraclavicular area as well as possible node in the left axilla which is nontender  Pending labs and CT imaging  Signed out to Dr Linda Vargas  Amount and/or Complexity of Data Reviewed  Labs: ordered  Radiology: ordered  Disposition  Final diagnoses:   Supraclavicular lymphadenopathy     Time reflects when diagnosis was documented in both MDM as applicable and the Disposition within this note     Time User Action Codes Description Comment    6/15/2023 11:43 PM Claire [address]  [R59 0] Supraclavicular lymphadenopathy       ED Disposition     None      Follow-up Information     Follow up With Specialties Details Why [address] [facility], [address] [place] [address]  [phone]            Patient's Medications   Discharge Prescriptions    No medications on file       No discharge procedures on file      PDMP Review     None          ED Provider  Electronically Signed by           Rosi Herrera MD  06/15/23 5416

## 2023-06-16 NOTE — ED CARE HANDOFF
Emergency Department Sign Out Note        Sign out and transfer of care from Dr Renae Pelayo  See Separate Emergency Department note  The patient, Jason Arreaga, was evaluated by the previous provider for left clavicular swelling  Workup Completed:  Yes except CT pending    ED Course / Workup Pending (followup):  CT: FINDINGS:     CHEST     LUNGS: There is no infiltrate or pleural effusion  There is a 5 mm right lower lobe pulmonary nodule, series 5 image 59  There is no tracheal or endobronchial lesion      PLEURA:  Unremarkable      HEART/GREAT VESSELS: Heart is unremarkable for patient's age  No thoracic aortic aneurysm      MEDIASTINUM AND JACOBO:  Unremarkable      CHEST WALL AND LOWER NECK:  Unremarkable      ABDOMEN     LIVER/BILIARY TREE:  Unremarkable      GALLBLADDER:  No calcified gallstones  No pericholecystic inflammatory change      SPLEEN:  Unremarkable      PANCREAS:  Unremarkable      ADRENAL GLANDS:  Unremarkable      KIDNEYS/URETERS:  Unremarkable  No hydronephrosis      STOMACH AND BOWEL:  Unremarkable      APPENDIX:  No findings to suggest appendicitis      ABDOMINOPELVIC CAVITY:  No ascites  No pneumoperitoneum  No lymphadenopathy      VESSELS:  Unremarkable for patient's age      PELVIS     REPRODUCTIVE ORGANS:  Unremarkable for patient's age      URINARY BLADDER:  Unremarkable      ABDOMINAL WALL/INGUINAL REGIONS:  Unremarkable      OSSEOUS STRUCTURES:  No acute fracture or destructive osseous lesion      IMPRESSION:     No lymphadenopathy within the chest, abdomen or pelvis      No infiltrate or pleural effusion      5 mm right lower lobe pulmonary nodule  No prior studies are available for comparison  Based on current Fleischner Society 2017 Guidelines on incidental pulmonary nodule, no routine follow-up is needed if the patient is low risk  If the patient is high   risk, optional follow-up chest CT at 12 months can be considered      No acute intra-abdominal abnormality   No free air or free fluid      Workstation performed: [id number]     CT d/w patient and  with patient's permission  (+) tenderness and erythema of left clavicular area  Td UTD  Will tx with keflex and bactrim for cellulitis  Procedures  MDM        Disposition  Final diagnoses:   Cellulitis of chest wall   Pulmonary nodule     Time reflects when diagnosis was documented in both MDM as applicable and the Disposition within this note     Time User Action Codes Description Comment    6/15/2023 11:43 PM Giselle SCHULTE Add [R59 0] Supraclavicular lymphadenopathy     6/16/2023  2:48 AM Kayla Salines Remove [R59 0] Supraclavicular lymphadenopathy     6/16/2023  2:48 AM Kayla Salines Add [L03 313] Cellulitis of chest wall     6/16/2023  2:50 AM Kayla Salines Add [R91 1] Pulmonary nodule       ED Disposition     ED Disposition   Discharge    Condition   Stable    Date/Time   Fri Jun 16, 2023  2:49 AM    Comment   Jesenia Ramírez discharge to home/self care  Follow-up Information     Follow up With Specialties Details Why [facility], DO Family Medicine Call today Return sooner if increased pain, swelling, redness, rash, fever, vomiting, difficulty breathing  Will need follow up cat scan of chest for lung nodule in 12 months  [address]  [address]  [phone]          Patient's Medications   Discharge Prescriptions    CEPHALEXIN (KEFLEX) 250 MG CAPSULE    Take 2 capsules (500 mg total) by mouth 4 (four) times a day for 7 days       Start Date: 6/16/2023 End Date: 6/23/2023       Order Dose: 500 mg       Quantity: 56 capsule    Refills: 0    SULFAMETHOXAZOLE-TRIMETHOPRIM (BACTRIM DS) 800-160 MG PER TABLET    Take 1 tablet by mouth 2 (two) times a day for 7 days       Start Date: 6/16/2023 End Date: 6/23/2023       Order Dose: 1 tablet       Quantity: 14 tablet    Refills: 0     No discharge procedures on file        ED Provider  Electronically Signed by     Heber Pastrana MD  06/16/23 8508

## 2023-06-19 LAB
ATRIAL RATE: 71 BPM
P AXIS: 74 DEGREES
PR INTERVAL: 158 MS
QRS AXIS: 15 DEGREES
QRSD INTERVAL: 142 MS
QT INTERVAL: 436 MS
QTC INTERVAL: 473 MS
T WAVE AXIS: 62 DEGREES
VENTRICULAR RATE: 71 BPM

## 2023-06-19 PROCEDURE — 93010 ELECTROCARDIOGRAM REPORT: CPT | Performed by: INTERNAL MEDICINE

## 2023-06-21 ENCOUNTER — OFFICE VISIT (OUTPATIENT)
Dept: FAMILY MEDICINE CLINIC | Facility: MEDICAL CENTER | Age: 63
End: 2023-06-21
Payer: COMMERCIAL

## 2023-06-21 VITALS
DIASTOLIC BLOOD PRESSURE: 78 MMHG | HEIGHT: 62 IN | HEART RATE: 54 BPM | BODY MASS INDEX: 24.62 KG/M2 | OXYGEN SATURATION: 98 % | SYSTOLIC BLOOD PRESSURE: 128 MMHG | TEMPERATURE: 98.1 F | WEIGHT: 133.8 LBS

## 2023-06-21 DIAGNOSIS — R91.1 LUNG NODULE: ICD-10-CM

## 2023-06-21 DIAGNOSIS — Z09 HOSPITAL DISCHARGE FOLLOW-UP: Primary | ICD-10-CM

## 2023-06-21 PROCEDURE — 99214 OFFICE O/P EST MOD 30 MIN: CPT | Performed by: STUDENT IN AN ORGANIZED HEALTH CARE EDUCATION/TRAINING PROGRAM

## 2023-06-21 NOTE — PROGRESS NOTES
[address] [facility] - Clinic Note  Ila Ewing, 23     Renuka Annette MRN: [id number] : 1960 Age: 58 y o  Assessment/Plan     1  Hospital discharge follow-up    -Patient presents for ER follow-up for left supraclavicular swelling  -Testing, lab work, and imaging completed in ER reviewed again with patient and partner in detail today, we discussed incidental finding  -No concerning lymphadenopathy on imaging  -Patient offers that swelling noted few days after receiving tetanus booster in left upper extremity  -Patient advised to contact if any persistent pain or change in symptoms, may pursue MRI if needed  -Patient will follow-up with cardiology, known PVCs however, LBBB noted on EKG, will obtain record of prior EKG to compare if this is new, patient denies any chest pain  -Patient without any breast complaints, advised if she experiences any of this we will adjust mammogram scheduled in August to sooner for diagnostic mammogram  -Advised patient if anxiousness persists or heightens to contact me and may need further guidance as far as medication regimen  -2023 CT chest abdomen pelvis with contrast:  IMPRESSION:     No lymphadenopathy within the chest, abdomen or pelvis      No infiltrate or pleural effusion      5 mm right lower lobe pulmonary nodule  No prior studies are available for comparison  Based on current Fleischner Society 2017 Guidelines on incidental pulmonary nodule, no routine follow-up is needed if the patient is low risk  If the patient is high   risk, optional follow-up chest CT at 12 months can be considered      No acute intra-abdominal abnormality  No free air or free fluid      2  Lung nodule    -Incidental finding of lung nodule on CT scan in hospital, will plan to monitor in 1 year  -Former smoker  - CT chest wo contrast; Future    Renuka Bryant acknowledged understanding of treatment plan, all questions answered      Subjective      Renuka Bryant is a 58 "y o  female who presents for ER follow-up for supraclavicular swelling  Patient offers [place] Day she fell and landed on her chin in [place]  At that time she also experienced \" brush burn chest\"  Thursday a m  6/15/2023 noticed \" lump upper chest hurt with seatbelt\"  She proceeded to ER  Patient denies any fever, chills, night sweats, or chest pain  He denies any breast pain or noting any breast lumps  Patient mentions some increased anxiousness related to recent ER visit  Per ED note:  57-year-old female presented for evaluation after noticing some tender swelling over her left clavicle this morning  On exam here she has palpable lymphadenopathy in the left supraclavicular area as well as possible node in the left axilla which is nontender  Pending labs and CT imaging  Signed out to Dr Jonathan Bustamante      The following portions of the patient's history were reviewed and updated as appropriate: allergies, current medications, past family history, past medical history, past social history, past surgical history and problem list      Past Medical History:   Diagnosis Date   • Anxiety    • Arthritis    • Fibroids    • GERD (gastroesophageal reflux disease)    • Heart disease    • Varicella        No Known Allergies    Past Surgical History:   Procedure Laterality Date   • CARDIAC CATHETERIZATION     • DILATION AND EVACUATION     • TONSILECTOMY AND ADNOIDECTOMY     • TUBAL LIGATION         Family History   Problem Relation Age of Onset   • Breast cancer Mother    • Colon cancer Father    • Breast cancer Family    • Colon cancer Family    • Ovarian cancer Neg Hx    • Uterine cancer Neg Hx    • Cervical cancer Neg Hx        Social History     Socioeconomic History   • Marital status: /Civil Union     Spouse name: None   • Number of children: None   • Years of education: None   • Highest education level: None   Occupational History   • None   Tobacco Use   • Smoking status: Former   • Smokeless " "tobacco: Never   Vaping Use   • Vaping Use: Never used   Substance and Sexual Activity   • Alcohol use: Yes     Comment: socially    • Drug use: Never   • Sexual activity: Yes     Partners: Male     Birth control/protection: Post-menopausal   Other Topics Concern   • None   Social History Narrative   • None     Social Determinants of Health     Financial Resource Strain: Not on file   Food Insecurity: Not on file   Transportation Needs: Not on file   Physical Activity: Not on file   Stress: Not on file   Social Connections: Not on file   Intimate Partner Violence: Not on file   Housing Stability: Not on file       Current Outpatient Medications   Medication Sig Dispense Refill   • ALPRAZolam (XANAX) 0 25 mg tablet Take 0 25 mg by mouth daily as needed  0   • atenolol (TENORMIN) 25 mg tablet TAKE 1 TABLET (25 MG TOTAL) BY MOUTH DAILY  90 tablet 1   • cephalexin (KEFLEX) 250 mg capsule Take 2 capsules (500 mg total) by mouth 4 (four) times a day for 7 days 56 capsule 0   • cetirizine (ZyrTEC) 10 mg tablet Take 10 mg by mouth daily     • citalopram (CeleXA) 10 mg tablet TAKE 1 TABLET BY MOUTH EVERY DAY 90 tablet 1   • glucosamine-chondroitin 500-400 MG tablet Take 1 tablet by mouth in the morning  • Omega-3 Fatty Acids (Omega-3 Fish Oil) 500 MG CAPS Take 600 mg by mouth in the morning     • sulfamethoxazole-trimethoprim (Bactrim DS) 800-160 mg per tablet Take 1 tablet by mouth 2 (two) times a day for 7 days 14 tablet 0     No current facility-administered medications for this visit  Review of Systems     As noted in HPI    Objective      /78 (BP Location: Left arm, Patient Position: Sitting, Cuff Size: Adult)   Pulse (!) 54   Temp 98 1 °F (36 7 °C)   Ht 5' 2\" (1 575 m)   Wt 60 7 kg (133 lb 12 8 oz)   SpO2 98%   BMI 24 47 kg/m²     Physical Exam  Vitals reviewed  Constitutional:       General: She is not in acute distress  Appearance: Normal appearance  She is not ill-appearing     HENT:      " "Head: Normocephalic and atraumatic  Nose: Nose normal       Mouth/Throat:      Mouth: Mucous membranes are moist       Pharynx: Oropharynx is clear  Eyes:      Conjunctiva/sclera: Conjunctivae normal       Pupils: Pupils are equal, round, and reactive to light  Neck:      Comments: Left supraclavicular fullness  Cardiovascular:      Rate and Rhythm: Normal rate and regular rhythm  Pulses: Normal pulses  Heart sounds: Normal heart sounds  Pulmonary:      Effort: Pulmonary effort is normal       Breath sounds: Normal breath sounds  Musculoskeletal:      Cervical back: Neck supple  Skin:     General: Skin is warm and dry  Neurological:      Mental Status: She is alert and oriented to person, place, and time  Psychiatric:         Mood and Affect: Mood is anxious  Thought Content: Thought content normal              Some portions of this record may have been generated with voice recognition software  There may be translation, syntax, or grammatical errors  Occasional wrong word or \"sound-a-like\" substitutions may have occurred due to the inherent limitations of the voice recognition software  Read the chart carefully and recognize, using context, where substations may have occurred  If you have any questions, please contact the dictating provider for clarification or correction, as needed    "

## 2023-06-22 ENCOUNTER — TELEPHONE (OUTPATIENT)
Dept: FAMILY MEDICINE CLINIC | Facility: MEDICAL CENTER | Age: 63
End: 2023-06-22

## 2023-06-22 NOTE — TELEPHONE ENCOUNTER
Pt stopped in to fill out records request to send to her cardiologist   Britta Ferreira to that office today

## 2023-07-11 ENCOUNTER — PATIENT MESSAGE (OUTPATIENT)
Dept: FAMILY MEDICINE CLINIC | Facility: MEDICAL CENTER | Age: 63
End: 2023-07-11

## 2023-07-11 DIAGNOSIS — I44.7 LBBB (LEFT BUNDLE BRANCH BLOCK): Primary | ICD-10-CM

## 2023-07-11 DIAGNOSIS — I49.3 PVC'S (PREMATURE VENTRICULAR CONTRACTIONS): ICD-10-CM

## 2023-09-06 DIAGNOSIS — Z12.31 ENCOUNTER FOR SCREENING MAMMOGRAM FOR BREAST CANCER: ICD-10-CM

## 2023-09-22 ENCOUNTER — OFFICE VISIT (OUTPATIENT)
Dept: CARDIOLOGY CLINIC | Facility: MEDICAL CENTER | Age: 63
End: 2023-09-22
Payer: COMMERCIAL

## 2023-09-22 VITALS
SYSTOLIC BLOOD PRESSURE: 130 MMHG | OXYGEN SATURATION: 98 % | HEIGHT: 62 IN | WEIGHT: 137 LBS | HEART RATE: 54 BPM | DIASTOLIC BLOOD PRESSURE: 70 MMHG | BODY MASS INDEX: 25.21 KG/M2

## 2023-09-22 DIAGNOSIS — I49.3 PVC'S (PREMATURE VENTRICULAR CONTRACTIONS): ICD-10-CM

## 2023-09-22 DIAGNOSIS — I44.7 LBBB (LEFT BUNDLE BRANCH BLOCK): ICD-10-CM

## 2023-09-22 DIAGNOSIS — I34.1 MITRAL VALVE PROLAPSE: ICD-10-CM

## 2023-09-22 DIAGNOSIS — E78.00 ELEVATED LDL CHOLESTEROL LEVEL: Primary | ICD-10-CM

## 2023-09-22 PROCEDURE — 99204 OFFICE O/P NEW MOD 45 MIN: CPT | Performed by: INTERNAL MEDICINE

## 2023-09-22 NOTE — LETTER
September 22, 2023     Dimitri Bell,  [address]    Patient: Javier Allen   YOB: 1960   Date of Visit: 9/22/2023       Dear Dr. Radha Garza:    Thank you for referring Javier Allen to me for evaluation. Below are my notes for this consultation. If you have questions, please do not hesitate to call me. I look forward to following your patient along with you. Sincerely,        Cary Ahumada MD        CC: No Recipients    Cary Ahumada MD  9/22/2023  2:11 PM  Incomplete                                             Cardiology Consultation     Javier Allen  [id number]  1960  [facility] [id number]    1. LBBB (left bundle branch block)  Ambulatory Referral to Cardiology      2. PVC's (premature ventricular contractions)  Ambulatory Referral to Cardiology        Chief Complaint   Patient presents with   • New Patient Visit     Referred to by PCP for abnormal EKG. Bundle Branch Block     HPI: Patient is here for cardiac evaluation of abnormal EKG with left bundle branch block. Patient feels well, without complaints. No reported chest pain, shortness of breath, palpitations, lightheadedness, syncope, LE edema, orthopnea, PND, or significant weight changes. Patient remains active without any increased fatigue out of the ordinary.       Patient Active Problem List   Diagnosis   • Mitral valve prolapse   • SI (sacroiliac) pain   • Uterine leiomyoma   • Anxiety   • Fear of flying   • Seasonal allergies   • PVC's (premature ventricular contractions)   • Positive MITA (antinuclear antibody)   • Neck pain   • Elevated LDL cholesterol level     Past Medical History:   Diagnosis Date   • Anxiety    • Arthritis    • Fibroids    • GERD (gastroesophageal reflux disease)    • Heart disease    • Varicella      Social History     Socioeconomic History   • Marital status: /Civil Union     Spouse name: Not on file   • Number of children: Not on file   • Years of education: Not on file   • Highest education level: Not on file   Occupational History   • Not on file   Tobacco Use   • Smoking status: Former   • Smokeless tobacco: Never   Vaping Use   • Vaping Use: Never used   Substance and Sexual Activity   • Alcohol use: Yes     Comment: socially    • Drug use: Never   • Sexual activity: Yes     Partners: Male     Birth control/protection: Post-menopausal   Other Topics Concern   • Not on file   Social History Narrative   • Not on file     Social Determinants of Health     Financial Resource Strain: Not on file   Food Insecurity: Not on file   Transportation Needs: Not on file   Physical Activity: Not on file   Stress: Not on file   Social Connections: Not on file   Intimate Partner Violence: Not on file   Housing Stability: Not on file      Family History   Problem Relation Age of Onset   • Breast cancer Mother    • Colon cancer Father    • Breast cancer Family    • Colon cancer Family    • Ovarian cancer Neg Hx    • Uterine cancer Neg Hx    • Cervical cancer Neg Hx      Past Surgical History:   Procedure Laterality Date   • CARDIAC CATHETERIZATION     • DILATION AND EVACUATION     • TONSILECTOMY AND ADNOIDECTOMY     • TUBAL LIGATION         Current Outpatient Medications:   •  ALPRAZolam (XANAX) 0.25 mg tablet, Take 0.25 mg by mouth daily as needed, Disp: , Rfl: 0  •  atenolol (TENORMIN) 25 mg tablet, TAKE 1 TABLET (25 MG TOTAL) BY MOUTH DAILY. , Disp: 90 tablet, Rfl: 1  •  cetirizine (ZyrTEC) 10 mg tablet, Take 10 mg by mouth daily, Disp: , Rfl:   •  citalopram (CeleXA) 10 mg tablet, TAKE 1 TABLET BY MOUTH EVERY DAY, Disp: 90 tablet, Rfl: 1  •  glucosamine-chondroitin 500-400 MG tablet, Take 1 tablet by mouth in the morning., Disp: , Rfl:   •  Omega-3 Fatty Acids (Omega-3 Fish Oil) 500 MG CAPS, Take 600 mg by mouth in the morning, Disp: , Rfl:   No Known Allergies  Vitals:    09/22/23 1352   BP: 130/70   BP Location: Left arm   Patient Position: Sitting   Cuff Size: Adult   Pulse: (!) 54   SpO2: 98%   Weight: 62.1 kg (137 lb)   Height: 5' 2" (1.575 m)       Labs:  Admission on 06/15/2023, Discharged on 06/16/2023   Component Date Value   • Ventricular Rate 06/15/2023 71    • Atrial Rate 06/15/2023 71    • VA Interval 06/15/2023 158    • QRSD Interval 06/15/2023 142    • QT Interval 06/15/2023 436    • QTC Interval 06/15/2023 473    • P Axis 06/15/2023 74    • QRS Axis 06/15/2023 15    • T Wave Axis 06/15/2023 62    • WBC 06/15/2023 7.05    • RBC 06/15/2023 4.45    • Hemoglobin 06/15/2023 12.8    • Hematocrit 06/15/2023 38.8    • MCV 06/15/2023 87    • MCH 06/15/2023 28.8    • MCHC 06/15/2023 33.0    • RDW 06/15/2023 13.3    • MPV 06/15/2023 11.7    • Platelets 33/39/3572 233    • nRBC 06/15/2023 0    • Neutrophils Relative 06/15/2023 55    • Immat GRANS % 06/15/2023 0    • Lymphocytes Relative 06/15/2023 29    • Monocytes Relative 06/15/2023 8    • Eosinophils Relative 06/15/2023 7 (H)    • Basophils Relative 06/15/2023 1    • Neutrophils Absolute 06/15/2023 3.91    • Immature Grans Absolute 06/15/2023 0.01    • Lymphocytes Absolute 06/15/2023 2.06    • Monocytes Absolute 06/15/2023 0.53    • Eosinophils Absolute 06/15/2023 0.48    • Basophils Absolute 06/15/2023 0.06    • Sodium 06/15/2023 141    • Potassium 06/15/2023 3.6    • Chloride 06/15/2023 107    • CO2 06/15/2023 28    • ANION GAP 06/15/2023 6    • BUN 06/15/2023 15    • Creatinine 06/15/2023 0.58 (L)    • Glucose 06/15/2023 78    • Calcium 06/15/2023 8.9    • AST 06/15/2023 19    • ALT 06/15/2023 19    • Alkaline Phosphatase 06/15/2023 66    • Total Protein 06/15/2023 6.8    • Albumin 06/15/2023 4.2    • Total Bilirubin 06/15/2023 0.44    • eGFR 06/15/2023 99    Appointment on 03/30/2023   Component Date Value   • Cholesterol 05/04/2023 227 (H)    • Triglycerides 05/04/2023 60    • HDL, Direct 05/04/2023 66    • LDL Calculated 05/04/2023 149 (H)      Lab Results   Component Value Date    CHOL 202 08/18/2015    TRIG 60 05/04/2023    TRIG 66 08/18/2015    HDL 66 05/04/2023    HDL 83 08/18/2015     Imaging: No results found. Review of Systems:  Review of Systems    Physical Exam:  Physical Exam  Blood pressure 130/70, pulse (!) 54, height 5' 2" (1.575 m), weight 62.1 kg (137 lb), SpO2 98 %, not currently breastfeeding. Discussion/Summary:  Left bundle branch block: Seen on EKG along with a PVC. No prior EKGs for comparison. We will check a stress test for evaluation of coronary artery disease along with an echocardiogram to assess for structural heart disease. Elevated LDL: Currently on omega-3 fatty acids. 149 in May 2023. PVCs: Continued on atenolol.     Mitral valve prolapse: Stated diagnoses, will confirm with echocardiogram.

## 2023-09-22 NOTE — PROGRESS NOTES
Cardiology Consultation     Steven Tagn  [id number]  1960  [facility] [address]    1. Elevated LDL cholesterol level  NM myocardial perfusion spect (rx stress and/or rest)      2. LBBB (left bundle branch block)  Ambulatory Referral to Cardiology    NM myocardial perfusion spect (rx stress and/or rest)    Echo complete w/ contrast if indicated      3. PVC's (premature ventricular contractions)  Ambulatory Referral to Cardiology    NM myocardial perfusion spect (rx stress and/or rest)    Echo complete w/ contrast if indicated      4. Mitral valve prolapse  Echo complete w/ contrast if indicated        Chief Complaint   Patient presents with   • New Patient Visit     Referred to by PCP for abnormal EKG. Bundle Branch Block     HPI: Patient is here for cardiac evaluation of abnormal EKG with left bundle branch block, which she's known about since 1988. Patient feels well, without complaints. No reported chest pain, shortness of breath, palpitations, lightheadedness, syncope, LE edema, orthopnea, PND, or significant weight changes. Patient remains active without any increased fatigue out of the ordinary.       Patient Active Problem List   Diagnosis   • Mitral valve prolapse   • SI (sacroiliac) pain   • Uterine leiomyoma   • Anxiety   • Fear of flying   • Seasonal allergies   • PVC's (premature ventricular contractions)   • Positive MITA (antinuclear antibody)   • Neck pain   • Elevated LDL cholesterol level   • LBBB (left bundle branch block)     Past Medical History:   Diagnosis Date   • Anxiety    • Arthritis    • Fibroids    • GERD (gastroesophageal reflux disease)    • Heart disease    • Varicella      Social History     Socioeconomic History   • Marital status: /Civil Union     Spouse name: Not on file   • Number of children: Not on file   • Years of education: Not on file   • Highest education level: Not on file   Occupational History   • Not on file   Tobacco Use   • Smoking status: Former   • Smokeless tobacco: Never   Vaping Use   • Vaping Use: Never used   Substance and Sexual Activity   • Alcohol use: Yes     Comment: socially    • Drug use: Never   • Sexual activity: Yes     Partners: Male     Birth control/protection: Post-menopausal   Other Topics Concern   • Not on file   Social History Narrative   • Not on file     Social Determinants of Health     Financial Resource Strain: Not on file   Food Insecurity: Not on file   Transportation Needs: Not on file   Physical Activity: Not on file   Stress: Not on file   Social Connections: Not on file   Intimate Partner Violence: Not on file   Housing Stability: Not on file      Family History   Problem Relation Age of Onset   • Breast cancer Mother    • Colon cancer Father    • Breast cancer Family    • Colon cancer Family    • Ovarian cancer Neg Hx    • Uterine cancer Neg Hx    • Cervical cancer Neg Hx      Past Surgical History:   Procedure Laterality Date   • CARDIAC CATHETERIZATION     • DILATION AND EVACUATION     • TONSILECTOMY AND ADNOIDECTOMY     • TUBAL LIGATION         Current Outpatient Medications:   •  ALPRAZolam (XANAX) 0.25 mg tablet, Take 0.25 mg by mouth daily as needed, Disp: , Rfl: 0  •  atenolol (TENORMIN) 25 mg tablet, TAKE 1 TABLET (25 MG TOTAL) BY MOUTH DAILY. , Disp: 90 tablet, Rfl: 1  •  cetirizine (ZyrTEC) 10 mg tablet, Take 10 mg by mouth daily, Disp: , Rfl:   •  citalopram (CeleXA) 10 mg tablet, TAKE 1 TABLET BY MOUTH EVERY DAY, Disp: 90 tablet, Rfl: 1  •  glucosamine-chondroitin 500-400 MG tablet, Take 1 tablet by mouth in the morning., Disp: , Rfl:   •  Omega-3 Fatty Acids (Omega-3 Fish Oil) 500 MG CAPS, Take 600 mg by mouth in the morning, Disp: , Rfl:   No Known Allergies  Vitals:    09/22/23 1352   BP: 130/70   BP Location: Left arm   Patient Position: Sitting   Cuff Size: Adult   Pulse: (!) 54 SpO2: 98%   Weight: 62.1 kg (137 lb)   Height: 5' 2" (1.575 m)       Labs:  Admission on 06/15/2023, Discharged on 06/16/2023   Component Date Value   • Ventricular Rate 06/15/2023 71    • Atrial Rate 06/15/2023 71    • MS Interval 06/15/2023 158    • QRSD Interval 06/15/2023 142    • QT Interval 06/15/2023 436    • QTC Interval 06/15/2023 473    • P Axis 06/15/2023 74    • QRS Axis 06/15/2023 15    • T Wave Axis 06/15/2023 62    • WBC 06/15/2023 7.05    • RBC 06/15/2023 4.45    • Hemoglobin 06/15/2023 12.8    • Hematocrit 06/15/2023 38.8    • MCV 06/15/2023 87    • MCH 06/15/2023 28.8    • MCHC 06/15/2023 33.0    • RDW 06/15/2023 13.3    • MPV 06/15/2023 11.7    • Platelets 67/26/6910 233    • nRBC 06/15/2023 0    • Neutrophils Relative 06/15/2023 55    • Immat GRANS % 06/15/2023 0    • Lymphocytes Relative 06/15/2023 29    • Monocytes Relative 06/15/2023 8    • Eosinophils Relative 06/15/2023 7 (H)    • Basophils Relative 06/15/2023 1    • Neutrophils Absolute 06/15/2023 3.91    • Immature Grans Absolute 06/15/2023 0.01    • Lymphocytes Absolute 06/15/2023 2.06    • Monocytes Absolute 06/15/2023 0.53    • Eosinophils Absolute 06/15/2023 0.48    • Basophils Absolute 06/15/2023 0.06    • Sodium 06/15/2023 141    • Potassium 06/15/2023 3.6    • Chloride 06/15/2023 107    • CO2 06/15/2023 28    • ANION GAP 06/15/2023 6    • BUN 06/15/2023 15    • Creatinine 06/15/2023 0.58 (L)    • Glucose 06/15/2023 78    • Calcium 06/15/2023 8.9    • AST 06/15/2023 19    • ALT 06/15/2023 19    • Alkaline Phosphatase 06/15/2023 66    • Total Protein 06/15/2023 6.8    • Albumin 06/15/2023 4.2    • Total Bilirubin 06/15/2023 0.44    • eGFR 06/15/2023 99    Appointment on 03/30/2023   Component Date Value   • Cholesterol 05/04/2023 227 (H)    • Triglycerides 05/04/2023 60    • HDL, Direct 05/04/2023 66    • LDL Calculated 05/04/2023 149 (H)      Lab Results   Component Value Date    CHOL 202 08/18/2015    TRIG 60 05/04/2023    TRIG 66 08/18/2015    HDL 66 05/04/2023    HDL 83 08/18/2015     Imaging: No results found. Review of Systems:  Review of Systems   Constitutional: Negative for activity change, appetite change, chills, diaphoresis, fatigue and unexpected weight change. HENT: Negative for hearing loss, nosebleeds and sore throat. Eyes: Negative for photophobia and visual disturbance. Respiratory: Negative for cough, chest tightness, shortness of breath and wheezing. Cardiovascular: Negative for chest pain, palpitations and leg swelling. Gastrointestinal: Negative for abdominal pain, diarrhea, nausea and vomiting. Endocrine: Negative for polyuria. Genitourinary: Negative for dysuria, frequency and hematuria. Musculoskeletal: Negative for arthralgias, back pain, gait problem and neck pain. Skin: Negative for pallor and rash. Neurological: Negative for dizziness, syncope and headaches. Hematological: Does not bruise/bleed easily. Psychiatric/Behavioral: Negative for behavioral problems and confusion. Physical Exam:  Physical Exam  Vitals reviewed. Constitutional:       Appearance: She is well-developed. She is not diaphoretic. HENT:      Head: Normocephalic and atraumatic. Nose: Nose normal.   Eyes:      General: No scleral icterus. Pupils: Pupils are equal, round, and reactive to light. Neck:      Vascular: No JVD. Cardiovascular:      Rate and Rhythm: Normal rate and regular rhythm. Heart sounds: Normal heart sounds. No murmur heard. No friction rub. No gallop. Pulmonary:      Effort: Pulmonary effort is normal. No respiratory distress. Breath sounds: Normal breath sounds. No wheezing or rales. Abdominal:      General: Bowel sounds are normal. There is no distension. Palpations: Abdomen is soft. Tenderness: There is no abdominal tenderness. Musculoskeletal:         General: No deformity. Normal range of motion.       Cervical back: Normal range of motion and neck supple. Skin:     General: Skin is warm and dry. Findings: No rash. Neurological:      Mental Status: She is alert and oriented to person, place, and time. Cranial Nerves: No cranial nerve deficit. Psychiatric:         Behavior: Behavior normal.       Blood pressure 130/70, pulse (!) 54, height 5' 2" (1.575 m), weight 62.1 kg (137 lb), SpO2 98 %, not currently breastfeeding. Discussion/Summary:  Left bundle branch block: Seen on EKG along with a PVC. No prior EKGs for comparison. We will check a stress test for evaluation of coronary artery disease along with an echocardiogram to assess for structural heart disease. Elevated LDL: Currently on omega-3 fatty acids. 149 in May 2023. PVCs: Continued on atenolol, which she's known about since 1988.     Mitral valve prolapse: Stated diagnoses, will confirm with echocardiogram.

## 2023-10-03 ENCOUNTER — HOSPITAL ENCOUNTER (OUTPATIENT)
Dept: NON INVASIVE DIAGNOSTICS | Facility: CLINIC | Age: 63
Discharge: HOME | End: 2023-10-03
Payer: COMMERCIAL

## 2023-10-03 VITALS
BODY MASS INDEX: 25.21 KG/M2 | HEIGHT: 62 IN | DIASTOLIC BLOOD PRESSURE: 70 MMHG | SYSTOLIC BLOOD PRESSURE: 130 MMHG | WEIGHT: 137 LBS | HEART RATE: 55 BPM

## 2023-10-03 VITALS
SYSTOLIC BLOOD PRESSURE: 156 MMHG | DIASTOLIC BLOOD PRESSURE: 76 MMHG | HEART RATE: 62 BPM | OXYGEN SATURATION: 97 % | BODY MASS INDEX: 25.21 KG/M2 | HEIGHT: 62 IN | WEIGHT: 137 LBS

## 2023-10-03 DIAGNOSIS — I49.3 PVC'S (PREMATURE VENTRICULAR CONTRACTIONS): ICD-10-CM

## 2023-10-03 DIAGNOSIS — E78.00 ELEVATED LDL CHOLESTEROL LEVEL: ICD-10-CM

## 2023-10-03 DIAGNOSIS — I44.7 LBBB (LEFT BUNDLE BRANCH BLOCK): ICD-10-CM

## 2023-10-03 DIAGNOSIS — I34.1 MITRAL VALVE PROLAPSE: ICD-10-CM

## 2023-10-03 LAB
AORTIC ROOT: 2.8 CM
APICAL FOUR CHAMBER EJECTION FRACTION: 47 %
AV LVOT MEAN GRADIENT: 2 MMHG
AV LVOT PEAK GRADIENT: 4 MMHG
AV PEAK GRADIENT: 8 MMHG
DOP CALC LVOT PEAK VEL VTI: 21.38 CM
DOP CALC LVOT PEAK VEL: 0.94 M/S
E WAVE DECELERATION TIME: 272 MS
FRACTIONAL SHORTENING: 29 % (ref 28–44)
INTERVENTRICULAR SEPTUM IN DIASTOLE (PARASTERNAL SHORT AXIS VIEW): 1 CM
INTERVENTRICULAR SEPTUM: 1 CM (ref 0.6–1.1)
LAAS-AP2: 18.5 CM2
LAAS-AP4: 17.1 CM2
LEFT ATRIUM AREA SYSTOLE SINGLE PLANE A4C: 17.8 CM2
LEFT ATRIUM SIZE: 3.8 CM
LEFT ATRIUM VOLUME (MOD BIPLANE): 52 ML
LEFT INTERNAL DIMENSION IN SYSTOLE: 3.6 CM (ref 2.1–4)
LEFT VENTRICULAR INTERNAL DIMENSION IN DIASTOLE: 5.1 CM (ref 3.5–6)
LEFT VENTRICULAR POSTERIOR WALL IN END DIASTOLE: 1 CM
LEFT VENTRICULAR STROKE VOLUME: 73 ML
LVSV (TEICH): 73 ML
MITRAL REGURGITATION PEAK VELOCITY: 6.2 M/S
MITRAL VALVE MEAN INFLOW VELOCITY: 5.01 M/S
MITRAL VALVE REGURGITANT PEAK GRADIENT: 154 MMHG
MV E'TISSUE VEL-SEP: 8 CM/S
MV PEAK A VEL: 0.84 M/S
MV PEAK E VEL: 55 CM/S
MV STENOSIS PRESSURE HALF TIME: 79 MS
MV VALVE AREA P 1/2 METHOD: 2.78 CM2
NUC STRESS DIASTOLIC VOLUME INDEX: 98 ML/M2
NUC STRESS EJECTION FRACTION: 63 %
NUC STRESS SYSTOLIC VOLUME INDEX: 36 ML/M2
RATE PRESSURE PRODUCT: NORMAL
RIGHT ATRIUM AREA SYSTOLE A4C: 9.1 CM2
RIGHT VENTRICLE ID DIMENSION: 2.9 CM
SL CV DOP CALC MV VTI RETROGRADE: 285.3 CM
SL CV LEFT ATRIUM LENGTH A2C: 4.9 CM
SL CV LV EF: 50
SL CV MV MEAN GRADIENT RETROGRADE: 109 MMHG
SL CV PED ECHO LEFT VENTRICLE DIASTOLIC VOLUME (MOD BIPLANE) 2D: 126 ML
SL CV PED ECHO LEFT VENTRICLE SYSTOLIC VOLUME (MOD BIPLANE) 2D: 53 ML
SL CV REST NUCLEAR ISOTOPE DOSE: 10.69 MCI
SL CV STRESS NUCLEAR ISOTOPE DOSE: 32.2 MCI
SL CV STRESS RECOVERY BP: NORMAL MMHG
SL CV STRESS RECOVERY HR: 67 BPM
SL CV STRESS RECOVERY O2 SAT: 100 %
STRESS ANGINA INDEX: 0
STRESS BASELINE BP: NORMAL MMHG
STRESS BASELINE HR: 51 BPM
STRESS O2 SAT REST: 97 %
STRESS PEAK HR: 88 BPM
STRESS POST O2 SAT PEAK: 98 %
STRESS POST PEAK BP: 126 MMHG
STRESS/REST PERFUSION RATIO: 1.06
TR MAX PG: 24 MMHG
TR PEAK VELOCITY: 2.5 M/S
TRICUSPID ANNULAR PLANE SYSTOLIC EXCURSION: 2.7 CM
TRICUSPID VALVE PEAK REGURGITATION VELOCITY: 2.45 M/S

## 2023-10-03 PROCEDURE — A9502 TC99M TETROFOSMIN: HCPCS

## 2023-10-03 PROCEDURE — 78452 HT MUSCLE IMAGE SPECT MULT: CPT | Performed by: INTERNAL MEDICINE

## 2023-10-03 PROCEDURE — 78452 HT MUSCLE IMAGE SPECT MULT: CPT

## 2023-10-03 PROCEDURE — 93306 TTE W/DOPPLER COMPLETE: CPT | Performed by: INTERNAL MEDICINE

## 2023-10-03 PROCEDURE — 93017 CV STRESS TEST TRACING ONLY: CPT

## 2023-10-03 PROCEDURE — 93306 TTE W/DOPPLER COMPLETE: CPT

## 2023-10-03 PROCEDURE — 93016 CV STRESS TEST SUPVJ ONLY: CPT | Performed by: INTERNAL MEDICINE

## 2023-10-03 PROCEDURE — G1004 CDSM NDSC: HCPCS

## 2023-10-03 PROCEDURE — 93018 CV STRESS TEST I&R ONLY: CPT | Performed by: INTERNAL MEDICINE

## 2023-10-03 RX ORDER — REGADENOSON 0.08 MG/ML
0.4 INJECTION, SOLUTION INTRAVENOUS ONCE
Status: DISCONTINUED | OUTPATIENT
Start: 2023-10-03 | End: 2023-10-04 | Stop reason: HOSPADM

## 2023-10-04 ENCOUNTER — TELEPHONE (OUTPATIENT)
Dept: CARDIOLOGY CLINIC | Facility: CLINIC | Age: 63
End: 2023-10-04

## 2023-10-04 LAB
CHEST PAIN STATEMENT: NORMAL
MAX DIASTOLIC BP: 76 MMHG
MAX HEART RATE: 88 BPM
MAX PREDICTED HEART RATE: 158 BPM
MAX. SYSTOLIC BP: 156 MMHG
PROTOCOL NAME: NORMAL
REASON FOR TERMINATION: NORMAL
TARGET HR FORMULA: NORMAL
TEST INDICATION: NORMAL
TIME IN EXERCISE PHASE: NORMAL

## 2023-10-27 DIAGNOSIS — F41.9 ANXIETY: ICD-10-CM

## 2023-10-27 DIAGNOSIS — Z79.899 CURRENT USE OF BETA BLOCKER: ICD-10-CM

## 2023-10-27 RX ORDER — ATENOLOL 25 MG/1
25 TABLET ORAL DAILY
Qty: 90 TABLET | Refills: 1 | Status: SHIPPED | OUTPATIENT
Start: 2023-10-27

## 2023-10-27 RX ORDER — CITALOPRAM HYDROBROMIDE 10 MG/1
TABLET ORAL
Qty: 90 TABLET | Refills: 1 | Status: SHIPPED | OUTPATIENT
Start: 2023-10-27

## 2023-11-10 ENCOUNTER — OFFICE VISIT (OUTPATIENT)
Dept: CARDIOLOGY CLINIC | Facility: MEDICAL CENTER | Age: 63
End: 2023-11-10
Payer: COMMERCIAL

## 2023-11-10 VITALS
WEIGHT: 145 LBS | DIASTOLIC BLOOD PRESSURE: 80 MMHG | OXYGEN SATURATION: 98 % | SYSTOLIC BLOOD PRESSURE: 122 MMHG | BODY MASS INDEX: 26.68 KG/M2 | HEIGHT: 62 IN | HEART RATE: 62 BPM

## 2023-11-10 DIAGNOSIS — E78.00 ELEVATED LDL CHOLESTEROL LEVEL: ICD-10-CM

## 2023-11-10 DIAGNOSIS — I49.3 PVC'S (PREMATURE VENTRICULAR CONTRACTIONS): ICD-10-CM

## 2023-11-10 DIAGNOSIS — I34.1 MITRAL VALVE PROLAPSE: ICD-10-CM

## 2023-11-10 DIAGNOSIS — I44.7 LBBB (LEFT BUNDLE BRANCH BLOCK): Primary | ICD-10-CM

## 2023-11-10 DIAGNOSIS — I34.0 NONRHEUMATIC MITRAL VALVE REGURGITATION: ICD-10-CM

## 2023-11-10 PROCEDURE — 99214 OFFICE O/P EST MOD 30 MIN: CPT | Performed by: INTERNAL MEDICINE

## 2023-11-10 NOTE — PROGRESS NOTES
Cardiology Consultation     Javier Brochure  [id number]  1960  [facility] [address]    1. LBBB (left bundle branch block)        2. Mitral valve prolapse        3. PVC's (premature ventricular contractions)        4. Elevated LDL cholesterol level        5. Nonrheumatic mitral valve regurgitation          Chief Complaint   Patient presents with    Follow-up     4 week f/up     HPI: Patient is here for cardiac evaluation of abnormal EKG with left bundle branch block, which she's known about since 1988. Patient feels well, without complaints. No reported chest pain, shortness of breath, palpitations, lightheadedness, syncope, LE edema, orthopnea, PND, or significant weight changes. Patient remains active without any increased fatigue out of the ordinary.       Patient Active Problem List   Diagnosis    Mitral valve prolapse    SI (sacroiliac) pain    Uterine leiomyoma    Anxiety    Fear of flying    Seasonal allergies    PVC's (premature ventricular contractions)    Positive MITA (antinuclear antibody)    Neck pain    Elevated LDL cholesterol level    LBBB (left bundle branch block)    Nonrheumatic mitral valve regurgitation     Past Medical History:   Diagnosis Date    Anxiety     Arthritis     Fibroids     GERD (gastroesophageal reflux disease)     Heart disease     Varicella      Social History     Socioeconomic History    Marital status: /Civil Union     Spouse name: Not on file    Number of children: Not on file    Years of education: Not on file    Highest education level: Not on file   Occupational History    Not on file   Tobacco Use    Smoking status: Former    Smokeless tobacco: Never   Vaping Use    Vaping Use: Never used   Substance and Sexual Activity    Alcohol use: Yes     Comment: socially     Drug use: Never    Sexual activity: Yes     Partners: Male     Birth control/protection: Post-menopausal   Other Topics Concern    Not on file   Social History Narrative    Not on file     Social Determinants of Health     Financial Resource Strain: Not on file   Food Insecurity: Not on file   Transportation Needs: Not on file   Physical Activity: Not on file   Stress: Not on file   Social Connections: Not on file   Intimate Partner Violence: Not on file   Housing Stability: Not on file      Family History   Problem Relation Age of Onset    Breast cancer Mother     Colon cancer Father     Breast cancer Family     Colon cancer Family     Ovarian cancer Neg Hx     Uterine cancer Neg Hx     Cervical cancer Neg Hx      Past Surgical History:   Procedure Laterality Date    CARDIAC CATHETERIZATION      DILATION AND EVACUATION      TONSILECTOMY AND ADNOIDECTOMY      TUBAL LIGATION         Current Outpatient Medications:     ALPRAZolam (XANAX) 0.25 mg tablet, Take 0.25 mg by mouth daily as needed, Disp: , Rfl: 0    atenolol (TENORMIN) 25 mg tablet, TAKE 1 TABLET (25 MG TOTAL) BY MOUTH DAILY. , Disp: 90 tablet, Rfl: 1    cetirizine (ZyrTEC) 10 mg tablet, Take 10 mg by mouth daily, Disp: , Rfl:     citalopram (CeleXA) 10 mg tablet, TAKE 1 TABLET BY MOUTH EVERY DAY, Disp: 90 tablet, Rfl: 1    glucosamine-chondroitin 500-400 MG tablet, Take 1 tablet by mouth in the morning., Disp: , Rfl:     Omega-3 Fatty Acids (Omega-3 Fish Oil) 500 MG CAPS, Take 600 mg by mouth in the morning, Disp: , Rfl:   No Known Allergies  Vitals:    11/10/23 0852   BP: 122/80   BP Location: Left arm   Patient Position: Sitting   Cuff Size: Adult   Pulse: 62   SpO2: 98%   Weight: 65.8 kg (145 lb)   Height: 5' 2" (1.575 m)       Labs:  Hospital Outpatient Visit on 10/03/2023   Component Date Value    Baseline HR 10/03/2023 51     Baseline BP 10/03/2023 156/76     O2 sat rest 10/03/2023 97     Stress peak HR 10/03/2023 88     Post peak BP 10/03/2023 126     Rate Pressure Product 10/03/2023 11,088.0     O2 sat peak 10/03/2023 98     Recovery HR 10/03/2023 67     Recovery BP 10/03/2023 132/84     O2 sat recovery 10/03/2023 100     Angina Index 10/03/2023 0     Rest Nuclear Isotope Dose 10/03/2023 10.69     Stress Nuclear Isotope D* 10/03/2023 32.20     Stress/rest perfusion ra* 10/03/2023 9.16     End diastolic index (mL/* 40/47/2340 98.0     EF (%) 10/03/2023 63     End systolic index (mL/m* 73/01/4315 36.0     Protocol Name 10/03/2023 LEXISCAN-SIT     Time In Exercise Phase 10/03/2023 00:03:00     MAX.  SYSTOLIC BP 08/49/4998 016     Max Diastolic Bp 22/35/2615 76     Max Heart Rate 10/03/2023 88     Max Predicted Heart Rate 10/03/2023 158     Reason for Termination 10/03/2023 Protocol Complete     Test Indication 10/03/2023 LBBB, PVCs     Target Hr Formular 10/03/2023 (220 - Age)*85%     Chest Pain Statement 10/03/2023 none    Hospital Outpatient Visit on 10/03/2023   Component Date Value    MV mean gradient retrogr* 10/03/2023 109     LA size 10/03/2023 3.8     Mitral regurgitation pea* 10/03/2023 6.20     Mitral valve mean inflow* 10/03/2023 5.01     Triscuspid Valve Regurgi* 10/03/2023 24.0     Tricuspid valve peak reg* 10/03/2023 2.45     LVPWd 10/03/2023 1.00     Left Atrium Area-systoli* 10/03/2023 18.5     Left Atrium Area-systoli* 10/03/2023 17.1     MV E' Tissue Velocity Se* 10/03/2023 8     Tricuspid annular plane * 10/03/2023 2.70     TR Peak Sae 10/03/2023 2.5     IVSd 10/03/2023 9.13     LV DIASTOLIC VOLUME (MOD* 49/96/5125 126     LEFT VENTRICLE SYSTOLIC * 92/30/6430 53     Left ventricular stroke * 10/03/2023 73.00     MV VTI RETROGRADE 10/03/2023 285.3     A4C EF 10/03/2023 47     LA length (A2C) 10/03/2023 4.90     LVIDd 10/03/2023 5.10     IVS 10/03/2023 1     LVIDS 10/03/2023 3.60     FS 10/03/2023 29     LA volume (BP) 10/03/2023 52     Ao root 10/03/2023 2.80     RVID d 10/03/2023 2.9     LVOT mn grad 10/03/2023 2.0     AV LVOT peak gradient 10/03/2023 4     MV valve area p 1/2 meth* 10/03/2023 2.78     E wave deceleration time 10/03/2023 272     LVOT peak sae 10/03/2023 0.94     LVOT peak VTI 10/03/2023 21.38     AV peak gradient 10/03/2023 8     MV Peak E Sae 10/03/2023 55     MV Peak A Sae 10/03/2023 0.84     AC A4C 10/03/2023 17.8     MR PG 10/03/2023 154     RAA A4C 10/03/2023 9.1     MV stenosis pressure 1/2* 10/03/2023 79     LVSV, 2D 10/03/2023 73     LV EF 10/03/2023 50    Admission on 06/15/2023, Discharged on 06/16/2023   Component Date Value    Ventricular Rate 06/15/2023 71     Atrial Rate 06/15/2023 71     CO Interval 06/15/2023 158     QRSD Interval 06/15/2023 142     QT Interval 06/15/2023 436     QTC Interval 06/15/2023 473     P Axis 06/15/2023 74     QRS Axis 06/15/2023 15     T Wave Axis 06/15/2023 62     WBC 06/15/2023 7.05     RBC 06/15/2023 4.45     Hemoglobin 06/15/2023 12.8     Hematocrit 06/15/2023 38.8     MCV 06/15/2023 87     MCH 06/15/2023 28.8     MCHC 06/15/2023 33.0     RDW 06/15/2023 13.3     MPV 06/15/2023 11.7     Platelets 05/89/2812 233     nRBC 06/15/2023 0     Neutrophils Relative 06/15/2023 55     Immat GRANS % 06/15/2023 0     Lymphocytes Relative 06/15/2023 29     Monocytes Relative 06/15/2023 8     Eosinophils Relative 06/15/2023 7 (H)     Basophils Relative 06/15/2023 1     Neutrophils Absolute 06/15/2023 3.91     Immature Grans Absolute 06/15/2023 0.01     Lymphocytes Absolute 06/15/2023 2.06     Monocytes Absolute 06/15/2023 0.53     Eosinophils Absolute 06/15/2023 0.48     Basophils Absolute 06/15/2023 0.06     Sodium 06/15/2023 141     Potassium 06/15/2023 3.6     Chloride 06/15/2023 107     CO2 06/15/2023 28     ANION GAP 06/15/2023 6     BUN 06/15/2023 15     Creatinine 06/15/2023 0.58 (L)     Glucose 06/15/2023 78     Calcium 06/15/2023 8.9     AST 06/15/2023 19     ALT 06/15/2023 19     Alkaline Phosphatase 06/15/2023 66     Total Protein 06/15/2023 6.8     Albumin 06/15/2023 4.2     Total Bilirubin 06/15/2023 0.44     eGFR 06/15/2023 99      Lab Results   Component Value Date    CHOL 202 08/18/2015    TRIG 60 05/04/2023    TRIG 66 08/18/2015    HDL 66 05/04/2023    HDL 83 08/18/2015     Imaging: No results found. Review of Systems:  Review of Systems   Constitutional:  Negative for activity change, appetite change, chills, diaphoresis, fatigue and unexpected weight change. HENT:  Negative for hearing loss, nosebleeds and sore throat. Eyes:  Negative for photophobia and visual disturbance. Respiratory:  Negative for cough, chest tightness, shortness of breath and wheezing. Cardiovascular:  Negative for chest pain, palpitations and leg swelling. Gastrointestinal:  Negative for abdominal pain, diarrhea, nausea and vomiting. Endocrine: Negative for polyuria. Genitourinary:  Negative for dysuria, frequency and hematuria. Musculoskeletal:  Negative for arthralgias, back pain, gait problem and neck pain. Skin:  Negative for pallor and rash. Neurological:  Negative for dizziness, syncope and headaches. Hematological:  Does not bruise/bleed easily. Psychiatric/Behavioral:  Negative for behavioral problems and confusion. Physical Exam:  Physical Exam  Vitals reviewed. Constitutional:       Appearance: She is well-developed. She is not diaphoretic. HENT:      Head: Normocephalic and atraumatic. Nose: Nose normal.   Eyes:      General: No scleral icterus. Pupils: Pupils are equal, round, and reactive to light. Neck:      Vascular: No JVD. Cardiovascular:      Rate and Rhythm: Normal rate and regular rhythm. Heart sounds: Normal heart sounds. No murmur heard. No friction rub. No gallop. Pulmonary:      Effort: Pulmonary effort is normal. No respiratory distress. Breath sounds: Normal breath sounds. No wheezing or rales. Abdominal:      General: Bowel sounds are normal. There is no distension. Palpations: Abdomen is soft. Tenderness: There is no abdominal tenderness.    Musculoskeletal:         General: No deformity. Normal range of motion. Cervical back: Normal range of motion and neck supple. Skin:     General: Skin is warm and dry. Findings: No rash. Neurological:      Mental Status: She is alert and oriented to person, place, and time. Cranial Nerves: No cranial nerve deficit. Psychiatric:         Behavior: Behavior normal.       Blood pressure 122/80, pulse 62, height 5' 2" (1.575 m), weight 65.8 kg (145 lb), SpO2 98 %, not currently breastfeeding. Discussion/Summary:  Left bundle branch block: Seen on EKG along with a PVC. No prior EKGs for comparison. Nuclear stress test revealed no significant ischemic changes with a normal LVEF. Echocardiogram revealed normal LV size with ejection fraction of 50% with grade 1 diastolic dysfunction and mildly prolapsed posterior mitral valve leaflet with mild regurgitation. Low normal ejection fraction is likely from the left bundle branch block. Elevated LDL: Currently on omega-3 fatty acids.  in May 2023, which would warrant medical therapy. PVCs: Continued on atenolol, which she's known about since 1988. Symptoms are well controlled. Mitral valve prolapse: echocardiogram revealed mildly prolapsed posterior leaflet with mild regurgitation, we will continue to follow this over time for symptom development as well as changes in valve morphology and/or level of regurgitation.

## 2023-11-13 ENCOUNTER — OFFICE VISIT (OUTPATIENT)
Dept: FAMILY MEDICINE CLINIC | Facility: MEDICAL CENTER | Age: 63
End: 2023-11-13
Payer: COMMERCIAL

## 2023-11-13 VITALS
OXYGEN SATURATION: 96 % | DIASTOLIC BLOOD PRESSURE: 80 MMHG | HEART RATE: 52 BPM | WEIGHT: 144.4 LBS | HEIGHT: 62 IN | BODY MASS INDEX: 26.57 KG/M2 | SYSTOLIC BLOOD PRESSURE: 134 MMHG | TEMPERATURE: 98.2 F

## 2023-11-13 DIAGNOSIS — E78.00 ELEVATED LDL CHOLESTEROL LEVEL: ICD-10-CM

## 2023-11-13 DIAGNOSIS — I51.89 GRADE I DIASTOLIC DYSFUNCTION: ICD-10-CM

## 2023-11-13 DIAGNOSIS — J30.2 SEASONAL ALLERGIES: ICD-10-CM

## 2023-11-13 DIAGNOSIS — I34.1 MITRAL VALVE PROLAPSE: ICD-10-CM

## 2023-11-13 DIAGNOSIS — Z09 FOLLOW-UP EXAM, 3-6 MONTHS SINCE PREVIOUS EXAM: Primary | ICD-10-CM

## 2023-11-13 DIAGNOSIS — I34.0 NONRHEUMATIC MITRAL VALVE REGURGITATION: ICD-10-CM

## 2023-11-13 DIAGNOSIS — F41.9 ANXIETY: ICD-10-CM

## 2023-11-13 DIAGNOSIS — I49.3 PVC'S (PREMATURE VENTRICULAR CONTRACTIONS): ICD-10-CM

## 2023-11-13 DIAGNOSIS — I44.7 LBBB (LEFT BUNDLE BRANCH BLOCK): ICD-10-CM

## 2023-11-13 DIAGNOSIS — R91.1 LUNG NODULE SEEN ON IMAGING STUDY: ICD-10-CM

## 2023-11-13 DIAGNOSIS — Z23 ENCOUNTER FOR IMMUNIZATION: ICD-10-CM

## 2023-11-13 PROCEDURE — 99214 OFFICE O/P EST MOD 30 MIN: CPT | Performed by: STUDENT IN AN ORGANIZED HEALTH CARE EDUCATION/TRAINING PROGRAM

## 2023-11-13 PROCEDURE — 90471 IMMUNIZATION ADMIN: CPT

## 2023-11-13 PROCEDURE — 90686 IIV4 VACC NO PRSV 0.5 ML IM: CPT

## 2023-11-13 NOTE — PROGRESS NOTES
[address] - Clinic Note  Ronel Sun, 23     Greta Sifuentes MRN: [id number] : 1960 Age: 61 y.o. Assessment/Plan     1. Follow-up exam, 3-6 months since previous exam    -Patient presents for medical follow-up today, she will return in 6 months and sooner as needed  -Immunizations reviewed: Advised about newest COVID-19 vaccine and Shingrix vaccination series, counseled on influenza vaccine and agreeable to receiving today    2. Encounter for immunization    - influenza vaccine, quadrivalent, 0.5 mL, preservative-free, for adult and pediatric patients 6 mos+ (AFLURIA, FLUARIX, FLULAVAL, FLUZONE)    3. Mitral valve prolapse    -Had recent follow-up with Cardiology     4. Nonrheumatic mitral valve regurgitation      -Had recent follow-up with Cardiology     5. LBBB (left bundle branch block)    -Had recent follow-up with Cardiology     6. PVC's (premature ventricular contractions)    -Had recent follow-up with Cardiology   -Currently on atenolol 25 mg p.o. daily  -Has discussed bradycardia with her Cardiologist    7. Grade I diastolic dysfunction    -Had recent follow-up with Cardiology     8. Anxiety    -Stable current management, continue citalopram 10 mg p.o. daily  BERTRAM-7 Flowsheet Screening      Flowsheet Row Most Recent Value   Over the last 2 weeks, how often have you been bothered by any of the following problems? Feeling nervous, anxious, or on edge 1   Not being able to stop or control worrying 0   Worrying too much about different things 1   Trouble relaxing 0   Being so restless that it is hard to sit still 0   Becoming easily annoyed or irritable 0   Feeling afraid as if something awful might happen 1   BERTRAM-7 Total Score 3            9. Elevated LDL cholesterol level    - Lipid Panel with Direct LDL reflex;  Future  The 10-year ASCVD risk score (Tico DK, et al., 2019) is: 4.8%    Values used to calculate the score:      Age: 61 years      Sex: Female      Is Non- : No      Diabetic: No      Tobacco smoker: No      Systolic Blood Pressure: 629 mmHg      Is BP treated: No      HDL Cholesterol: 66 mg/dL      Total Cholesterol: 227 mg/dL      10. Seasonal allergies    -Symptoms managed with Zyrtec    11. Lung nodule seen on imaging study    -Follow-up CT lung scan scheduled in June 2024      BMI Counseling: Body mass index is 26.41 kg/m². The BMI is above normal. Nutrition recommendations include encouraging healthy choices of fruits and vegetables. Exercise recommendations include exercising 3-5 times per week. Rationale for BMI follow-up plan is due to patient being overweight or obese. Oral Primrose acknowledged understanding of treatment plan, all questions answered. Subjective Oral Primrose is a 61 y.o. female who presents for medical follow-up. She has a past medical history including mitral valve prolapse, mitral valve regurgitation, left bundle branch block, PVCs, grade 1 diastolic dysfunction, anxiety, seasonal allergies and lung nodule. Patient recently saw specialist at [address] [place] [address] for jaw pain. She takes NSAID as needed.     The following portions of the patient's history were reviewed and updated as appropriate: allergies, current medications, past family history, past medical history, past social history, past surgical history and problem list.     Past Medical History:   Diagnosis Date    Anxiety     Arthritis     Fibroids     GERD (gastroesophageal reflux disease)     Heart disease     Heart murmur 1988    HL (hearing loss)     Deaf right ear    Shingles     Varicella        No Known Allergies    Past Surgical History:   Procedure Laterality Date    CARDIAC CATHETERIZATION      DILATION AND EVACUATION      TONSILECTOMY AND ADNOIDECTOMY      TUBAL LIGATION         Family History   Problem Relation Age of Onset    Breast cancer Mother     Cancer Mother     Hearing loss Mother     Colon cancer Father     Coronary artery disease Father     COPD Father     Cancer Father     Breast cancer Family     Colon cancer Family     Ovarian cancer Neg Hx     Uterine cancer Neg Hx     Cervical cancer Neg Hx        Social History     Socioeconomic History    Marital status: /Civil Union     Spouse name: None    Number of children: None    Years of education: None    Highest education level: None   Occupational History    None   Tobacco Use    Smoking status: Former     Packs/day: 0.25     Years: 10.00     Total pack years: 2.50     Types: Cigarettes    Smokeless tobacco: Never   Vaping Use    Vaping Use: Never used   Substance and Sexual Activity    Alcohol use: Yes     Alcohol/week: 4.0 standard drinks of alcohol     Types: 2 Glasses of wine, 2 Cans of beer per week     Comment: socially     Drug use: Never    Sexual activity: Yes     Partners: Male     Birth control/protection: Post-menopausal   Other Topics Concern    None   Social History Narrative    None     Social Determinants of Health     Financial Resource Strain: Not on file   Food Insecurity: Not on file   Transportation Needs: Not on file   Physical Activity: Not on file   Stress: Not on file   Social Connections: Not on file   Intimate Partner Violence: Not on file   Housing Stability: Not on file       Current Outpatient Medications   Medication Sig Dispense Refill    atenolol (TENORMIN) 25 mg tablet TAKE 1 TABLET (25 MG TOTAL) BY MOUTH DAILY. 90 tablet 1    B Complex Vitamins (B COMPLEX 1 PO) Take by mouth      cetirizine (ZyrTEC) 10 mg tablet Take 10 mg by mouth daily      citalopram (CeleXA) 10 mg tablet TAKE 1 TABLET BY MOUTH EVERY DAY 90 tablet 1    glucosamine-chondroitin 500-400 MG tablet Take 1 tablet by mouth in the morning.       Omega-3 Fatty Acids (Omega-3 Fish Oil) 500 MG CAPS Take 600 mg by mouth in the morning      ALPRAZolam (XANAX) 0.25 mg tablet Take 0.25 mg by mouth daily as needed (Patient not taking: Reported on 11/13/2023)  0     No current facility-administered medications for this visit. Review of Systems     As noted in HPI    Objective      /80 (BP Location: Left arm, Patient Position: Sitting, Cuff Size: Adult)   Pulse (!) 52   Temp 98.2 °F (36.8 °C)   Ht 5' 2" (1.575 m)   Wt 65.5 kg (144 lb 6.4 oz)   SpO2 96%   BMI 26.41 kg/m²     Physical Exam  Vitals reviewed. Constitutional:       General: She is not in acute distress. Appearance: Normal appearance. HENT:      Head: Normocephalic and atraumatic. Right Ear: External ear normal.      Left Ear: External ear normal.      Nose: Nose normal.      Mouth/Throat:      Mouth: Mucous membranes are moist.      Pharynx: Oropharynx is clear. Eyes:      Extraocular Movements: Extraocular movements intact. Conjunctiva/sclera: Conjunctivae normal.      Pupils: Pupils are equal, round, and reactive to light. Cardiovascular:      Rate and Rhythm: Regular rhythm. Bradycardia present. Pulses: Normal pulses. Heart sounds: Normal heart sounds. Pulmonary:      Effort: Pulmonary effort is normal.      Breath sounds: Normal breath sounds. Abdominal:      General: Abdomen is flat. Bowel sounds are normal.      Palpations: Abdomen is soft. Tenderness: There is no abdominal tenderness. Musculoskeletal:      Cervical back: Neck supple. Right lower leg: No edema. Left lower leg: No edema. Lymphadenopathy:      Cervical: No cervical adenopathy. Skin:     General: Skin is warm and dry. Neurological:      Mental Status: She is alert and oriented to person, place, and time. Psychiatric:         Mood and Affect: Mood normal.         Behavior: Behavior normal.         Thought Content: Thought content normal.         Judgment: Judgment normal.             Some portions of this record may have been generated with voice recognition software. There may be translation, syntax, or grammatical errors.  Occasional wrong word or "sound-a-like" substitutions may have occurred due to the inherent limitations of the voice recognition software. Read the chart carefully and recognize, using context, where substations may have occurred. If you have any questions, please contact the dictating provider for clarification or correction, as needed.

## 2024-02-06 ENCOUNTER — OFFICE VISIT (OUTPATIENT)
Dept: FAMILY MEDICINE CLINIC | Facility: MEDICAL CENTER | Age: 64
End: 2024-02-06
Payer: COMMERCIAL

## 2024-02-06 VITALS
DIASTOLIC BLOOD PRESSURE: 80 MMHG | OXYGEN SATURATION: 95 % | SYSTOLIC BLOOD PRESSURE: 130 MMHG | HEIGHT: 62 IN | HEART RATE: 51 BPM | WEIGHT: 143 LBS | BODY MASS INDEX: 26.31 KG/M2 | TEMPERATURE: 98.7 F

## 2024-02-06 DIAGNOSIS — T16.2XXA FOREIGN BODY OF LEFT EAR, INITIAL ENCOUNTER: Primary | ICD-10-CM

## 2024-02-06 PROCEDURE — 99213 OFFICE O/P EST LOW 20 MIN: CPT

## 2024-02-06 NOTE — PROGRESS NOTES
Assessment/Plan:    Referral to ENT asap. Will have staff call for asap appointment.     1. Foreign body of left ear, initial encounter  Visualized dark gray/black-colored object in left ear canal with use of otoscope.  Attempted to gently flush out of ear, unsuccessful.  Will have patient see ENT ASAP for removal of foreign body.  Patient asymptomatic.  - Ambulatory Referral to Otolaryngology; Future      Subjective:      Patient ID: Shell Dey is a 63 y.o. female.    63 year old female presents with complaint of possible piece of hearing aid stuck in left ear since yesterday. She took her hearing aid out yesterday, the rubber piece on the end of the hearing aid was missing and she is not sure whether or not it is still in her ear or on her floor somewhere.  She is very nervous as she is deaf in her right ear and does not want to lose hearing in her left ear.  She called her hearing aid company and was advised to have her ear checked before putting hearing aid back in with new rubber piece.   She denies pain, drainage from ear. Unknown if hearing is changed as she does not have her hearing aid in at this time and is deaf in her right ear.         The following portions of the patient's history were reviewed and updated as appropriate: allergies, current medications, past medical history, past social history, past surgical history, and problem list.    Review of Systems   Constitutional: Negative.    HENT:          Sensation of fb in left ear   Eyes: Negative.    Respiratory: Negative.     Cardiovascular: Negative.    Gastrointestinal: Negative.    Endocrine: Negative.    Genitourinary: Negative.    Musculoskeletal: Negative.    Skin: Negative.    Allergic/Immunologic: Negative.    Neurological: Negative.    Hematological: Negative.    Psychiatric/Behavioral: Negative.           Objective:      /80 (BP Location: Right arm, Patient Position: Sitting, Cuff Size: Standard)   Pulse (!) 51   Temp 98.7 °F (37.1  "°C) (Temporal)   Ht 5' 2\" (1.575 m)   Wt 64.9 kg (143 lb)   SpO2 95%   BMI 26.16 kg/m²          Physical Exam  Vitals and nursing note reviewed.   Constitutional:       General: She is not in acute distress.     Appearance: Normal appearance. She is not ill-appearing.   HENT:      Head: Normocephalic and atraumatic.      Right Ear: Tympanic membrane, ear canal and external ear normal.      Left Ear: A foreign body is present.   Pulmonary:      Effort: Pulmonary effort is normal.   Neurological:      General: No focal deficit present.      Mental Status: She is alert and oriented to person, place, and time. Mental status is at baseline.   Psychiatric:         Mood and Affect: Mood normal.         Behavior: Behavior normal.         Thought Content: Thought content normal.                    JAMAAL Jamil  "

## 2024-02-09 NOTE — PROGRESS NOTES
Assessment/Plan:    Encounter for gynecological examination  Pap/HPV current  Mammogram ordered   Colonoscopy current    Encourage healthy diet, exercise, Calcium 1200mg per day and at least 800 iu Vitamin D daily.       Diagnoses and all orders for this visit:    Encounter for gynecological examination    Encounter for screening mammogram for breast cancer  -     Mammo screening bilateral w 3d & cad; Future          Subjective:      Patient ID: Shell Dey is a 63 y.o. female.    Patient presents for a routine annual visit  Menarche- 14Y/O  Last Pap Smear- 21 neg/neg    Mammogram-23  Colonoscopy-10/19/21 recall 5 years    Former smoker  Social drinker  Currently sexually active  Mother with breast cancer  Father with colon cancer    Worsening hot flashes night sweats, insomnia, hair loss and weight gain.  - Very healthy lifestyle - exercises daily, eats healthy. Reviewed common menopausal symptoms. Not interested in hormonal treatments.  Reviewed OTC supplementation that may be helpful        Gynecologic Exam  She reports no genital itching, genital lesions, genital odor, genital rash, pelvic pain, vaginal bleeding or vaginal discharge. The patient is experiencing no pain.     The following portions of the patient's history were reviewed and updated as appropriate: She  has a past medical history of Abnormal ECG (2009), Anxiety, Arthritis, Fibroids, GERD (gastroesophageal reflux disease), Heart disease, Heart murmur (), Heart valve disease, HL (hearing loss), Shingles, and Varicella.  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  Her family history includes Breast cancer in her family and mother; COPD in her father; Cancer in her father and mother; Colon cancer in her family and father; Coronary artery disease in her father; Hearing loss in her mother.  She  reports that she has quit smoking. Her smoking use included cigarettes. She has a 2.5 pack-year smoking history. She has never used smokeless tobacco. She reports current alcohol use of about 4.0 standard drinks of alcohol per week. She reports that she does not use drugs.  Current Outpatient Medications   Medication Sig Dispense Refill    atenolol (TENORMIN) 25 mg tablet TAKE 1 TABLET (25 MG TOTAL) BY MOUTH DAILY. 90 tablet 1    B Complex Vitamins (B COMPLEX 1 PO) Take by mouth      cetirizine (ZyrTEC) 10 mg tablet Take 10 mg by mouth daily      citalopram (CeleXA) 10 mg tablet TAKE 1 TABLET BY MOUTH EVERY DAY 90 tablet 1    glucosamine-chondroitin 500-400 MG tablet Take 1 tablet by mouth in the morning.      Omega-3 Fatty Acids (Omega-3 Fish Oil) 500 MG CAPS Take 600 mg by mouth in the morning       No current facility-administered medications for this visit.     Current Outpatient Medications on File Prior to Visit   Medication Sig    atenolol (TENORMIN) 25 mg tablet TAKE 1 TABLET (25 MG TOTAL) BY MOUTH DAILY.    B Complex Vitamins (B COMPLEX 1 PO) Take by mouth    cetirizine (ZyrTEC) 10 mg tablet Take 10 mg by mouth daily    citalopram (CeleXA) 10 mg tablet TAKE 1 TABLET BY MOUTH EVERY DAY    glucosamine-chondroitin 500-400 MG tablet Take 1 tablet by mouth in the morning.     "Omega-3 Fatty Acids (Omega-3 Fish Oil) 500 MG CAPS Take 600 mg by mouth in the morning     No current facility-administered medications on file prior to visit.     She has No Known Allergies..    Review of Systems   Genitourinary:  Negative for pelvic pain and vaginal discharge.         Objective:      /80 (BP Location: Left arm, Patient Position: Sitting, Cuff Size: Standard)   Ht 5' 1.5\" (1.562 m)   Wt 65 kg (143 lb 3.2 oz)   BMI 26.62 kg/m²          Physical Exam      "

## 2024-02-12 ENCOUNTER — ANNUAL EXAM (OUTPATIENT)
Dept: OBGYN CLINIC | Facility: CLINIC | Age: 64
End: 2024-02-12
Payer: COMMERCIAL

## 2024-02-12 VITALS
DIASTOLIC BLOOD PRESSURE: 80 MMHG | SYSTOLIC BLOOD PRESSURE: 138 MMHG | WEIGHT: 143.2 LBS | HEIGHT: 62 IN | BODY MASS INDEX: 26.35 KG/M2

## 2024-02-12 DIAGNOSIS — Z01.419 ENCOUNTER FOR GYNECOLOGICAL EXAMINATION: Primary | ICD-10-CM

## 2024-02-12 DIAGNOSIS — Z12.31 ENCOUNTER FOR SCREENING MAMMOGRAM FOR BREAST CANCER: ICD-10-CM

## 2024-02-12 PROCEDURE — 99396 PREV VISIT EST AGE 40-64: CPT | Performed by: OBSTETRICS & GYNECOLOGY

## 2024-02-12 NOTE — ASSESSMENT & PLAN NOTE
Pap/HPV current  Mammogram ordered   Colonoscopy current    Encourage healthy diet, exercise, Calcium 1200mg per day and at least 800 iu Vitamin D daily.

## 2024-02-21 PROBLEM — Z01.419 ENCOUNTER FOR GYNECOLOGICAL EXAMINATION: Status: RESOLVED | Noted: 2023-02-01 | Resolved: 2024-02-21

## 2024-04-22 DIAGNOSIS — F41.9 ANXIETY: ICD-10-CM

## 2024-04-22 RX ORDER — CITALOPRAM HYDROBROMIDE 10 MG/1
TABLET ORAL
Qty: 90 TABLET | Refills: 1 | Status: SHIPPED | OUTPATIENT
Start: 2024-04-22

## 2024-05-10 DIAGNOSIS — Z79.899 CURRENT USE OF BETA BLOCKER: ICD-10-CM

## 2024-05-10 RX ORDER — ATENOLOL 25 MG/1
25 TABLET ORAL DAILY
Qty: 90 TABLET | Refills: 1 | Status: SHIPPED | OUTPATIENT
Start: 2024-05-10

## 2024-05-16 ENCOUNTER — APPOINTMENT (OUTPATIENT)
Dept: LAB | Facility: MEDICAL CENTER | Age: 64
End: 2024-05-16
Payer: COMMERCIAL

## 2024-05-16 DIAGNOSIS — E78.00 ELEVATED LDL CHOLESTEROL LEVEL: ICD-10-CM

## 2024-05-16 LAB
CHOLEST SERPL-MCNC: 266 MG/DL
HDLC SERPL-MCNC: 69 MG/DL
LDLC SERPL CALC-MCNC: 181 MG/DL (ref 0–100)
TRIGL SERPL-MCNC: 78 MG/DL

## 2024-05-16 PROCEDURE — 36415 COLL VENOUS BLD VENIPUNCTURE: CPT

## 2024-05-16 PROCEDURE — 80061 LIPID PANEL: CPT

## 2024-05-21 ENCOUNTER — OFFICE VISIT (OUTPATIENT)
Dept: FAMILY MEDICINE CLINIC | Facility: MEDICAL CENTER | Age: 64
End: 2024-05-21
Payer: COMMERCIAL

## 2024-05-21 VITALS
BODY MASS INDEX: 26.83 KG/M2 | TEMPERATURE: 97.5 F | DIASTOLIC BLOOD PRESSURE: 76 MMHG | SYSTOLIC BLOOD PRESSURE: 124 MMHG | HEIGHT: 62 IN | OXYGEN SATURATION: 99 % | RESPIRATION RATE: 18 BRPM | HEART RATE: 56 BPM | WEIGHT: 145.8 LBS

## 2024-05-21 DIAGNOSIS — Z13.29 SCREENING FOR THYROID DISORDER: ICD-10-CM

## 2024-05-21 DIAGNOSIS — E78.5 HYPERLIPIDEMIA, UNSPECIFIED HYPERLIPIDEMIA TYPE: ICD-10-CM

## 2024-05-21 DIAGNOSIS — Z00.00 ANNUAL PHYSICAL EXAM: Primary | ICD-10-CM

## 2024-05-21 DIAGNOSIS — F40.243 FEAR OF FLYING: ICD-10-CM

## 2024-05-21 PROCEDURE — 99213 OFFICE O/P EST LOW 20 MIN: CPT | Performed by: STUDENT IN AN ORGANIZED HEALTH CARE EDUCATION/TRAINING PROGRAM

## 2024-05-21 PROCEDURE — 99396 PREV VISIT EST AGE 40-64: CPT | Performed by: STUDENT IN AN ORGANIZED HEALTH CARE EDUCATION/TRAINING PROGRAM

## 2024-05-21 RX ORDER — ALPRAZOLAM 0.25 MG/1
0.25 TABLET ORAL AS NEEDED
Qty: 6 TABLET | Refills: 0 | Status: SHIPPED | OUTPATIENT
Start: 2024-05-21

## 2024-05-21 NOTE — PATIENT INSTRUCTIONS
Cholesterol and Your Health   AMBULATORY CARE:   Cholesterol  is a waxy, fat-like substance. Your body uses cholesterol to make hormones and new cells, and to protect nerves. Cholesterol is made by your body. It also comes from certain foods you eat, such as meat and dairy products. Your healthcare provider can help you set goals for your cholesterol levels. Your provider can help you create a plan to meet your goals.  Cholesterol level goals:  Your cholesterol level goals depend on your risk for heart disease, your age, and your other health conditions. The following are general guidelines:  Total cholesterol  includes low-density lipoprotein (LDL), high-density lipoprotein (HDL), and triglyceride levels. The total cholesterol level should be lower than 200 mg/dL and is best at about 150 mg/dL.    LDL cholesterol  is called bad cholesterol  because it forms plaque in your arteries. As plaque builds up, your arteries become narrow, and less blood flows through. When plaque decreases blood flow to your heart, you may have chest pain. If plaque completely blocks an artery that brings blood to your heart, you may have a heart attack. Plaque can break off and form blood clots. Blood clots may block arteries in your brain and cause a stroke. The level should be less than 130 mg/dL and is best at about 100 mg/dL.         HDL cholesterol  is called good cholesterol  because it helps remove LDL cholesterol from your arteries. It does this by attaching to LDL cholesterol and carrying it to your liver. Your liver breaks down LDL cholesterol so your body can get rid of it. High levels of HDL cholesterol can help prevent a heart attack and stroke. Low levels of HDL cholesterol can increase your risk for heart disease, heart attack, and stroke. The level should be at least 40 mg/dL in males or at least 50 mg/dL in females.    Triglycerides  are a type of fat that store energy from foods you eat. High levels of triglycerides also  cause plaque buildup. This can increase your risk for a heart attack or stroke. If your triglyceride level is high, your LDL cholesterol level may also be high. The level should be less than 150 mg/dL.    Any of the following can increase your risk for high cholesterol:   Smoking or drinking large amounts of alcohol    Having overweight or obesity, or not getting enough exercise    A medical condition such as hypertension (high blood pressure) or diabetes    A family history of high cholesterol    Age older than 65    What you need to know about having your cholesterol levels checked:  Adults 20 to 45 years of age should have their cholesterol levels checked every 4 to 6 years. Adults 45 years or older should have their cholesterol checked every 1 to 2 years. You may need your cholesterol checked more often, or at a younger age, if you have risk factors for heart disease. You may also need to have your cholesterol checked more often if you have other health conditions, such as diabetes. Blood tests are used to check cholesterol levels. Blood tests measure your levels of triglycerides, LDL cholesterol, and HDL cholesterol.  How healthy fats affect your cholesterol levels:  Healthy fats, also called unsaturated fats, help lower LDL cholesterol and triglyceride levels. Healthy fats include the following:  Monounsaturated fats  are found in foods such as olive oil, canola oil, avocado, nuts, and olives.    Polyunsaturated fats,  such as omega 3 fats, are found in fish, such as salmon, trout, and tuna. They can also be found in plant foods such as flaxseed, walnuts, and soybeans.    How unhealthy fats affect your cholesterol levels:  Unhealthy fats increase LDL cholesterol and triglyceride levels. They are found in foods high in cholesterol, saturated fat, and trans fat:  Cholesterol  is found in eggs, dairy, and meat.    Saturated fat  is found in butter, cheese, ice cream, whole milk, and coconut oil. Saturated fat is  also found in meat, such as sausage, hot dogs, and bologna.    Trans fat  is found in liquid oils and is used in fried and baked foods. Foods that contain trans fats include chips, crackers, muffins, sweet rolls, microwave popcorn, and cookies.    Treatment  for high cholesterol will also decrease your risk of heart disease, heart attack, and stroke. Treatment may include any of the following:  Lifestyle changes  may include food, exercise, weight loss, and quitting smoking. You may also need to decrease the amount of alcohol you drink. Your healthcare provider will want you to start with lifestyle changes. Other treatment may be added if lifestyle changes are not enough. Your healthcare provider may recommend you work with a team to manage hyperlipidemia. The team may include medical experts such as a dietitian, an exercise or physical therapist, and a behavior therapist. Your family members may be included in helping you create lifestyle changes.    Medicines  may be given to lower your LDL cholesterol, triglyceride levels, or total cholesterol level. You may need medicines to lower your cholesterol if any of the following is true:    You have a history of stroke, TIA, unstable angina, or a heart attack.    Your LDL cholesterol level is 190 mg/dL or higher.    You are age 40 to 75 years, have diabetes or heart disease risk factors, and your LDL cholesterol is 70 mg/dL or higher.    Supplements  include fish oil, red yeast rice, and garlic. Fish oil may help lower your triglyceride and LDL cholesterol levels. It may also increase your HDL cholesterol level. Red yeast rice may help decrease your total cholesterol level and LDL cholesterol level. Garlic may help lower your total cholesterol level. Do not take any supplements without talking to your healthcare provider.    Food changes you can make to lower your cholesterol levels:  A dietitian can help you create a healthy eating plan. Your dietitian can show you how  to read food labels and choose foods low in saturated fat, trans fats, and cholesterol.     Decrease the total amount of fat you eat.  Choose lean meats, fat-free or 1% fat milk, and low-fat dairy products, such as yogurt and cheese. Try to limit or avoid red meats. Limit or do not eat fried foods or baked goods, such as cookies.    Replace unhealthy fats with healthy fats.  Cook foods in olive oil or canola oil. Choose soft margarines that are low in saturated fat and trans fat. Seeds, nuts, and avocados are other examples of healthy fats.    Eat foods with omega-3 fats.  Examples include salmon, tuna, mackerel, walnuts, and flaxseed. Eat fish 2 times per week. Pregnant women should not eat fish that have high levels of mercury, such as shark, swordfish, and jhony mackerel.         Increase the amount of high-fiber foods you eat.  High-fiber foods can help lower your LDL cholesterol. Aim to get between 20 and 30 grams of fiber each day. Fruits and vegetables are high in fiber. Eat at least 5 servings each day. Other high-fiber foods are whole-grain or whole-wheat breads, pastas, or cereals, and brown rice. Eat 3 ounces of whole-grain foods each day. Increase fiber slowly. You may have abdominal discomfort, bloating, and gas if you add fiber to your diet too quickly.         Eat healthy protein foods.  Examples include low-fat dairy products, skinless chicken and turkey, fish, and nuts.    Limit foods and drinks that are high in sugar.  Your dietitian or healthcare provider can help you create daily limits for high-sugar foods and drinks. The limit may be lower if you have diabetes or another health condition. Limits can also help you lose weight if needed.  Lifestyle changes you can make to lower your cholesterol levels:   Maintain a healthy weight.  Ask your healthcare provider what a healthy weight is for you. Ask your provider to help you create a weight loss plan if needed. Weight loss can decrease your total  cholesterol and triglyceride levels. Weight loss may also help keep your blood pressure at a healthy level.    Be physically active throughout the day.  Physical activity, such as exercise, can help lower your total cholesterol level and maintain a healthy weight. Physical activity can also help increase your HDL cholesterol level. Work with your healthcare provider to create an program that is right for you. Get at least 30 to 40 minutes of moderate physical activity most days of the week. Examples of exercise include brisk walking, swimming, or biking. Also include strength training at least 2 times each week. Your healthcare providers can help you create a physical activity plan.            Do not smoke.  Nicotine and other chemicals in cigarettes and cigars can raise your cholesterol levels. Ask your healthcare provider for information if you currently smoke and need help to quit. E-cigarettes or smokeless tobacco still contain nicotine. Talk to your healthcare provider before you use these products.         Limit or do not drink alcohol.  Alcohol can increase your triglyceride levels. Ask your healthcare provider before you drink alcohol. Ask how much is okay for you to drink in 24 hours or 1 week.    Follow up with your doctor as directed:  Write down your questions so you remember to ask them during your visits.  © Copyright [employer] 2023 Information is for End User's use only and may not be sold, redistributed or otherwise used for commercial purposes.  The above information is an  only. It is not intended as medical advice for individual conditions or treatments. Talk to your doctor, nurse or pharmacist before following any medical regimen to see if it is safe and effective for you.

## 2024-05-21 NOTE — PROGRESS NOTES
[facility] HEALTH MAINTENANCE OFFICE VISIT  [facility] - [facility] [place]    NAME: Shell Dey  AGE: 63 y.o. SEX: female  : 1960     DATE: 2024    Assessment and Plan     1. Annual physical exam  2. Hyperlipidemia, unspecified hyperlipidemia type  Assessment & Plan:  -I have reviewed pertinent labs:  Lipid Profile:   Lab Results   Component Value Date    CHOLESTEROL 266 (H) 2024    HDL 69 2024    TRIG 78 2024    LDLCALC 181 (H) 2024   The 10-year ASCVD risk score (Tico YODER, et al., 2019) is: 4.5%    Values used to calculate the score:      Age: 63 years      Sex: Female      Is Non- : No      Diabetic: No      Tobacco smoker: No      Systolic Blood Pressure: 124 mmHg      Is BP treated: No      HDL Cholesterol: 69 mg/dL      Total Cholesterol: 266 mg/dL  -Patient states she has followed a poor diet in the past 2 months and knows she can make dietary adjustments, will allow patient sufficient time to do so and repeat lipid panel in 6 months      Orders:  -     Lipid Panel with Direct LDL reflex; Future; Expected date: 2024  -     Comprehensive metabolic panel; Future; Expected date: 2024  3. Screening for thyroid disorder  -     TSH, 3rd generation with Free T4 reflex; Future; Expected date: 2024      Patient Counseling:   Nutrition: Stressed importance of a well balanced diet, moderation of sodium/saturated fat, caloric balance and sufficient intake of fiber  Exercise: Stressed the importance of regular exercise with a goal of 150 minutes per week  Dental Health: Discussed daily flossing and brushing and regular dental visits   Counseled about sunscreen use and sun protection  Immunizations reviewed:   Informed about update with COVID-19 vaccine  She received influenza vaccine this season  Tetanus booster up-to-date  Informed about Shingrix vaccination series, she will discuss with her insurance  about coverage  Informed about RSV vaccine  Discussed benefits of:    Established with gynecology for well woman care  Last Pap smear 2021 normal cytology and negative HPV  Mammogram schedule  CT chest scheduled in June 2024, lung nodule  Colonoscopy next due 2026  BMI Counseling: Body mass index is 27.1 kg/m². Discussed with patient's BMI with her.       Depression Screening and Follow-up Plan: Patient was screened for depression during today's encounter. They screened negative with a PHQ-2 score of 0.      Follow-up in 6 months and sooner as needed.  Chief Complaint     Chief Complaint   Patient presents with    Physical Exam     annual       History of Present Illness     HPI    Well Adult Physical   Patient here for a comprehensive physical exam.      Diet and Physical Activity  Diet: Patient states she usually follows a well-balanced diet however, the past 2 months veered off of usual diet more junk food  Exercise: daily usually walks 3 to 5 miles per day     Depression Screen  PHQ-2/9 Depression Screening    Little interest or pleasure in doing things: 0 - not at all  Feeling down, depressed, or hopeless: 0 - not at all  PHQ-2 Score: 0  PHQ-2 Interpretation: Negative depression screen          General Health  Hearing: Hearing aid use: left  Vision: goes for regular eye exams and wears contacts  Dental: regular dental visits, brushes teeth once- twice daily, and flosses teeth occasionally    Reproductive Health  Follows with gynecologist      The following portions of the patient's history were reviewed and updated as appropriate: allergies, current medications, past family history, past medical history, past social history, past surgical history and problem list.    Review of Systems     Review of Systems    As noted in HPI     Past Medical History     Past Medical History:   Diagnosis Date    Abnormal ECG 04/2009    Anxiety     Arthritis     Fibroids     GERD (gastroesophageal reflux disease)     Heart  disease     Heart murmur 1988    Heart valve disease     HL (hearing loss)     Deaf right ear    Shingles     Varicella        Past Surgical History     Past Surgical History:   Procedure Laterality Date    CARDIAC CATHETERIZATION      DILATION AND EVACUATION      TONSILECTOMY AND ADNOIDECTOMY      TUBAL LIGATION         Social History     Social History     Socioeconomic History    Marital status: /Civil Union     Spouse name: None    Number of children: None    Years of education: None    Highest education level: None   Occupational History    None   Tobacco Use    Smoking status: Former     Current packs/day: 0.25     Average packs/day: 0.3 packs/day for 10.0 years (2.5 ttl pk-yrs)     Types: Cigarettes    Smokeless tobacco: Never   Vaping Use    Vaping status: Never Used   Substance and Sexual Activity    Alcohol use: Yes     Alcohol/week: 4.0 standard drinks of alcohol     Types: 2 Glasses of wine, 2 Cans of beer per week     Comment: socially     Drug use: Never    Sexual activity: Yes     Partners: Male     Birth control/protection: Post-menopausal   Other Topics Concern    None   Social History Narrative    None     Social Determinants of Health     Financial Resource Strain: Not on file   Food Insecurity: Not on file   Transportation Needs: Not on file   Physical Activity: Not on file   Stress: Not on file   Social Connections: Not on file   Intimate Partner Violence: Not on file   Housing Stability: Not on file       Family History     Family History   Problem Relation Age of Onset    Breast cancer Mother     Cancer Mother     Hearing loss Mother     Colon cancer Father     Coronary artery disease Father     COPD Father     Cancer Father     Breast cancer Family     Colon cancer Family     Ovarian cancer Neg Hx     Uterine cancer Neg Hx     Cervical cancer Neg Hx        Current Medications       Current Outpatient Medications:     atenolol (TENORMIN) 25 mg tablet, TAKE 1 TABLET (25 MG TOTAL) BY  "MOUTH DAILY., Disp: 90 tablet, Rfl: 1    B Complex Vitamins (B COMPLEX 1 PO), Take by mouth, Disp: , Rfl:     cetirizine (ZyrTEC) 10 mg tablet, Take 10 mg by mouth daily, Disp: , Rfl:     citalopram (CeleXA) 10 mg tablet, TAKE 1 TABLET BY MOUTH EVERY DAY, Disp: 90 tablet, Rfl: 1    Omega-3 Fatty Acids (Omega-3 Fish Oil) 500 MG CAPS, Take 600 mg by mouth in the morning, Disp: , Rfl:     glucosamine-chondroitin 500-400 MG tablet, Take 1 tablet by mouth in the morning., Disp: , Rfl:      Allergies     No Known Allergies    Objective     /76 (BP Location: Left arm, Patient Position: Sitting, Cuff Size: Standard)   Pulse 56   Temp 97.5 °F (36.4 °C) (Temporal)   Resp 18   Ht 5' 1.5\" (1.562 m)   Wt 66.1 kg (145 lb 12.8 oz)   SpO2 99%   BMI 27.10 kg/m²      Physical Exam  Vitals reviewed.   Constitutional:       General: She is not in acute distress.     Appearance: Normal appearance.   HENT:      Head: Normocephalic and atraumatic.      Right Ear: External ear normal.      Left Ear: External ear normal.      Nose: Nose normal.      Mouth/Throat:      Mouth: Mucous membranes are moist.      Pharynx: Oropharynx is clear.   Eyes:      Extraocular Movements: Extraocular movements intact.      Conjunctiva/sclera: Conjunctivae normal.      Pupils: Pupils are equal, round, and reactive to light.   Cardiovascular:      Rate and Rhythm: Normal rate and regular rhythm.      Pulses: Normal pulses.      Heart sounds: Normal heart sounds.   Pulmonary:      Effort: Pulmonary effort is normal.      Breath sounds: Normal breath sounds.   Abdominal:      General: Abdomen is flat. Bowel sounds are normal.      Palpations: Abdomen is soft.      Tenderness: There is no abdominal tenderness.   Musculoskeletal:      Cervical back: Neck supple.      Right lower leg: No edema.      Left lower leg: No edema.   Lymphadenopathy:      Cervical: No cervical adenopathy.   Skin:     General: Skin is warm and dry.   Neurological:      Mental " Status: She is alert and oriented to person, place, and time.   Psychiatric:         Mood and Affect: Mood normal.         Behavior: Behavior normal.         Thought Content: Thought content normal.         Judgment: Judgment normal.           No results found.        Naty Holbrook DO  [facility]

## 2024-06-10 ENCOUNTER — APPOINTMENT (OUTPATIENT)
Dept: LAB | Facility: MEDICAL CENTER | Age: 64
End: 2024-06-10
Payer: COMMERCIAL

## 2024-06-10 DIAGNOSIS — Z13.29 SCREENING FOR THYROID DISORDER: ICD-10-CM

## 2024-06-10 LAB — TSH SERPL DL<=0.05 MIU/L-ACNC: 1.3 UIU/ML (ref 0.45–4.5)

## 2024-06-10 PROCEDURE — 84443 ASSAY THYROID STIM HORMONE: CPT

## 2024-06-10 PROCEDURE — 36415 COLL VENOUS BLD VENIPUNCTURE: CPT

## 2024-06-20 ENCOUNTER — HOSPITAL ENCOUNTER (OUTPATIENT)
Dept: CT IMAGING | Facility: CLINIC | Age: 64
Discharge: HOME | End: 2024-06-20
Payer: COMMERCIAL

## 2024-06-20 DIAGNOSIS — R91.1 LUNG NODULE: ICD-10-CM

## 2024-06-20 PROCEDURE — G1004 CDSM NDSC: HCPCS

## 2024-06-20 PROCEDURE — 71250 CT THORAX DX C-: CPT

## 2024-07-03 DIAGNOSIS — R91.1 LUNG NODULE: Primary | ICD-10-CM

## 2024-08-10 ENCOUNTER — APPOINTMENT (OUTPATIENT)
Dept: RADIOLOGY | Facility: MEDICAL CENTER | Age: 64
End: 2024-08-10
Payer: COMMERCIAL

## 2024-08-10 DIAGNOSIS — K59.00 CONSTIPATION, UNSPECIFIED CONSTIPATION TYPE: ICD-10-CM

## 2024-08-10 PROCEDURE — 74018 RADEX ABDOMEN 1 VIEW: CPT

## 2024-09-03 ENCOUNTER — HOSPITAL ENCOUNTER (OUTPATIENT)
Dept: RADIOLOGY | Facility: MEDICAL CENTER | Age: 64
Discharge: HOME | End: 2024-09-03
Payer: COMMERCIAL

## 2024-09-03 VITALS — HEIGHT: 62 IN | WEIGHT: 145 LBS | BODY MASS INDEX: 26.68 KG/M2

## 2024-09-03 DIAGNOSIS — Z12.31 ENCOUNTER FOR SCREENING MAMMOGRAM FOR BREAST CANCER: ICD-10-CM

## 2024-09-03 PROCEDURE — 77067 SCR MAMMO BI INCL CAD: CPT

## 2024-09-03 PROCEDURE — 77063 BREAST TOMOSYNTHESIS BI: CPT

## 2024-11-02 DIAGNOSIS — F41.9 ANXIETY: ICD-10-CM

## 2024-11-02 RX ORDER — CITALOPRAM HYDROBROMIDE 10 MG/1
TABLET ORAL
Qty: 90 TABLET | Refills: 1 | Status: SHIPPED | OUTPATIENT
Start: 2024-11-02

## 2024-11-07 ENCOUNTER — APPOINTMENT (OUTPATIENT)
Dept: LAB | Facility: MEDICAL CENTER | Age: 64
End: 2024-11-07
Payer: COMMERCIAL

## 2024-11-07 DIAGNOSIS — E78.5 HYPERLIPIDEMIA, UNSPECIFIED HYPERLIPIDEMIA TYPE: ICD-10-CM

## 2024-11-07 LAB
ALBUMIN SERPL BCG-MCNC: 4.4 G/DL (ref 3.5–5)
ALP SERPL-CCNC: 54 U/L (ref 34–104)
ALT SERPL W P-5'-P-CCNC: 22 U/L (ref 7–52)
ANION GAP SERPL CALCULATED.3IONS-SCNC: 7 MMOL/L (ref 4–13)
AST SERPL W P-5'-P-CCNC: 21 U/L (ref 13–39)
BILIRUB SERPL-MCNC: 0.85 MG/DL (ref 0.2–1)
BUN SERPL-MCNC: 15 MG/DL (ref 5–25)
CALCIUM SERPL-MCNC: 9.2 MG/DL (ref 8.4–10.2)
CHLORIDE SERPL-SCNC: 105 MMOL/L (ref 96–108)
CHOLEST SERPL-MCNC: 234 MG/DL
CO2 SERPL-SCNC: 30 MMOL/L (ref 21–32)
CREAT SERPL-MCNC: 0.68 MG/DL (ref 0.6–1.3)
GFR SERPL CREATININE-BSD FRML MDRD: 92 ML/MIN/1.73SQ M
GLUCOSE P FAST SERPL-MCNC: 92 MG/DL (ref 65–99)
HDLC SERPL-MCNC: 62 MG/DL
LDLC SERPL CALC-MCNC: 152 MG/DL (ref 0–100)
POTASSIUM SERPL-SCNC: 3.8 MMOL/L (ref 3.5–5.3)
PROT SERPL-MCNC: 6.9 G/DL (ref 6.4–8.4)
SODIUM SERPL-SCNC: 142 MMOL/L (ref 135–147)
TRIGL SERPL-MCNC: 101 MG/DL

## 2024-11-07 PROCEDURE — 80061 LIPID PANEL: CPT

## 2024-11-07 PROCEDURE — 80053 COMPREHEN METABOLIC PANEL: CPT

## 2024-11-07 PROCEDURE — 36415 COLL VENOUS BLD VENIPUNCTURE: CPT

## 2024-11-13 DIAGNOSIS — Z79.899 CURRENT USE OF BETA BLOCKER: ICD-10-CM

## 2024-11-13 RX ORDER — ATENOLOL 25 MG/1
25 TABLET ORAL DAILY
Qty: 90 TABLET | Refills: 1 | Status: SHIPPED | OUTPATIENT
Start: 2024-11-13

## 2024-11-26 ENCOUNTER — RA CDI HCC (OUTPATIENT)
Dept: OTHER | Facility: HOSPITAL | Age: 64
End: 2024-11-26

## 2024-11-26 NOTE — PROGRESS NOTES
HCC coding opportunities       Chart reviewed, no opportunity found: CHART REVIEWED, NO OPPORTUNITY FOUND        Patients Insurance        Commercial Insurance: [employer]

## 2024-12-03 ENCOUNTER — OFFICE VISIT (OUTPATIENT)
Dept: CARDIOLOGY CLINIC | Facility: MEDICAL CENTER | Age: 64
End: 2024-12-03
Payer: COMMERCIAL

## 2024-12-03 ENCOUNTER — OFFICE VISIT (OUTPATIENT)
Dept: FAMILY MEDICINE CLINIC | Facility: MEDICAL CENTER | Age: 64
End: 2024-12-03
Payer: COMMERCIAL

## 2024-12-03 VITALS
WEIGHT: 147 LBS | DIASTOLIC BLOOD PRESSURE: 80 MMHG | BODY MASS INDEX: 27.05 KG/M2 | HEART RATE: 66 BPM | OXYGEN SATURATION: 96 % | SYSTOLIC BLOOD PRESSURE: 120 MMHG | HEIGHT: 62 IN

## 2024-12-03 VITALS
WEIGHT: 148.4 LBS | SYSTOLIC BLOOD PRESSURE: 106 MMHG | DIASTOLIC BLOOD PRESSURE: 78 MMHG | OXYGEN SATURATION: 97 % | HEART RATE: 70 BPM | HEIGHT: 62 IN | TEMPERATURE: 98.1 F | RESPIRATION RATE: 18 BRPM | BODY MASS INDEX: 27.31 KG/M2

## 2024-12-03 DIAGNOSIS — I34.0 NONRHEUMATIC MITRAL VALVE REGURGITATION: ICD-10-CM

## 2024-12-03 DIAGNOSIS — J30.2 SEASONAL ALLERGIES: ICD-10-CM

## 2024-12-03 DIAGNOSIS — I51.89 GRADE I DIASTOLIC DYSFUNCTION: ICD-10-CM

## 2024-12-03 DIAGNOSIS — Z09 FOLLOW-UP EXAM, 3-6 MONTHS SINCE PREVIOUS EXAM: Primary | ICD-10-CM

## 2024-12-03 DIAGNOSIS — I49.3 PVC'S (PREMATURE VENTRICULAR CONTRACTIONS): ICD-10-CM

## 2024-12-03 DIAGNOSIS — R91.1 LUNG NODULE SEEN ON IMAGING STUDY: ICD-10-CM

## 2024-12-03 DIAGNOSIS — E78.2 MIXED HYPERLIPIDEMIA: ICD-10-CM

## 2024-12-03 DIAGNOSIS — I34.1 MITRAL VALVE PROLAPSE: ICD-10-CM

## 2024-12-03 DIAGNOSIS — I44.7 LBBB (LEFT BUNDLE BRANCH BLOCK): Primary | ICD-10-CM

## 2024-12-03 DIAGNOSIS — E78.5 HYPERLIPIDEMIA, UNSPECIFIED HYPERLIPIDEMIA TYPE: ICD-10-CM

## 2024-12-03 DIAGNOSIS — F41.9 ANXIETY: ICD-10-CM

## 2024-12-03 DIAGNOSIS — K29.70 GASTRITIS WITHOUT BLEEDING, UNSPECIFIED CHRONICITY, UNSPECIFIED GASTRITIS TYPE: ICD-10-CM

## 2024-12-03 DIAGNOSIS — I44.7 LBBB (LEFT BUNDLE BRANCH BLOCK): ICD-10-CM

## 2024-12-03 PROCEDURE — 93000 ELECTROCARDIOGRAM COMPLETE: CPT | Performed by: INTERNAL MEDICINE

## 2024-12-03 PROCEDURE — 99214 OFFICE O/P EST MOD 30 MIN: CPT | Performed by: STUDENT IN AN ORGANIZED HEALTH CARE EDUCATION/TRAINING PROGRAM

## 2024-12-03 PROCEDURE — 99214 OFFICE O/P EST MOD 30 MIN: CPT | Performed by: INTERNAL MEDICINE

## 2024-12-03 NOTE — ASSESSMENT & PLAN NOTE
seen on EKG along with a PVC.  No prior EKGs for comparison.    Nuclear stress test revealed no significant ischemic changes with a normal LVEF.    Echocardiogram revealed normal LV size with ejection fraction of 50% with grade 1 diastolic dysfunction and mildly prolapsed posterior mitral valve leaflet with mild regurgitation.  Low normal ejection fraction is likely from the left bundle branch block.  No current symptoms to suggest need for additional testing currently

## 2024-12-03 NOTE — ASSESSMENT & PLAN NOTE
Mild regurgitation seen on echocardiogram  Without symptoms to warrant additional testing  Continued on beta-blocker

## 2024-12-03 NOTE — PROGRESS NOTES
[facility] - Clinic Note  Naty Holbrook DO, 24     Shell Dey MRN: [id number] : 1960 Age: 64 y.o.     Assessment/Plan     1. Follow-up exam, 3-6 months since previous exam (Primary)    -Return in 6 months for annual physical and sooner as needed    2. PVC's (premature ventricular contractions)    -Has an appointment with cardiology today  -On beta-blocker    3. Mitral valve prolapse    -Has an appointment with cardiology today    4. Nonrheumatic mitral valve regurgitation    -Has an appointment with cardiology today    5. LBBB (left bundle branch block)    -Has an appointment with cardiology today    6. Grade I diastolic dysfunction    -Normotensive    7. Hyperlipidemia, unspecified hyperlipidemia type    - I have reviewed pertinent labs:  CMP:   Lab Results   Component Value Date    SODIUM 142 2024    K 3.8 2024     2024    CO2 30 2024    AGAP 7 2024    BUN 15 2024    CREATININE 0.68 2024    GLUC 78 06/15/2023    GLUF 92 2024    CALCIUM 9.2 2024    AST 21 2024    ALT 22 2024    ALKPHOS 54 2024    TP 6.9 2024    ALB 4.4 2024    TBILI 0.85 2024    EGFR 92 2024     Lipid Profile:   Lab Results   Component Value Date    CHOLESTEROL 234 (H) 2024    HDL 62 2024    TRIG 101 2024    LDLCALC 152 (H) 2024     The 10-year ASCVD risk score (Tico YODER, et al., 2019) is: 3.6%    Values used to calculate the score:      Age: 64 years      Sex: Female      Is Non- : No      Diabetic: No      Tobacco smoker: No      Systolic Blood Pressure: 106 mmHg      Is BP treated: No      HDL Cholesterol: 62 mg/dL      Total Cholesterol: 234 mg/dL    8. Lung nodule seen on imaging study    -Repeat imaging due 2025    9. Anxiety    -Continues to do well with Celexa 10 mg p.o. daily  -Needs Xanax rarely for example if flying    10. Gastritis without bleeding,  unspecified chronicity, unspecified gastritis type    -PPI    11. Seasonal allergies    -Zyrtec 10 mg p.o. daily      Shell Dey acknowledged understanding of treatment plan, all questions answered.    Subjective      Shell Dey is a 64 y.o. female who presents for medical follow-up.  Patient feels well today and offers no acute complaints.  Patient enjoyed a recent trip to [place].  She walks 5 miles daily.    The following portions of the patient's history were reviewed and updated as appropriate: allergies, current medications, past family history, past medical history, past social history, past surgical history and problem list.     Past Medical History:   Diagnosis Date    Abnormal ECG 04/2009    Anxiety     Arthritis     Fibroids     GERD (gastroesophageal reflux disease)     Heart disease     Heart murmur 1988    Heart valve disease     HL (hearing loss)     Deaf right ear    Shingles     Varicella        No Known Allergies    Past Surgical History:   Procedure Laterality Date    BREAST CYST ASPIRATION      CARDIAC CATHETERIZATION      DILATION AND EVACUATION      TONSILECTOMY AND ADNOIDECTOMY      TUBAL LIGATION         Family History   Problem Relation Age of Onset    Breast cancer Mother 80    Cancer Mother     Hearing loss Mother     Colon cancer Father     Coronary artery disease Father     COPD Father     Cancer Father     BRCA1 Negative Sister     BRCA2 Negative Sister     No Known Problems Sister     No Known Problems Sister     Breast cancer Maternal Grandmother 30    Breast cancer Maternal Grandfather     Breast cancer Paternal Grandmother     Breast cancer Paternal Grandfather     Breast cancer Family     Colon cancer Family     No Known Problems Son     Stomach cancer Maternal Aunt     Ovarian cancer Neg Hx     Uterine cancer Neg Hx     Cervical cancer Neg Hx        Social History     Socioeconomic History    Marital status: /Civil Union     Spouse name: None    Number of children: None     Years of education: None    Highest education level: None   Occupational History    None   Tobacco Use    Smoking status: Former     Current packs/day: 0.25     Average packs/day: 0.3 packs/day for 10.0 years (2.5 ttl pk-yrs)     Types: Cigarettes    Smokeless tobacco: Never   Vaping Use    Vaping status: Never Used   Substance and Sexual Activity    Alcohol use: Yes     Alcohol/week: 4.0 standard drinks of alcohol     Types: 2 Glasses of wine, 2 Cans of beer per week     Comment: socially     Drug use: Never    Sexual activity: Yes     Partners: Male     Birth control/protection: Post-menopausal   Other Topics Concern    None   Social History Narrative    None     Social Drivers of Health     Financial Resource Strain: Low Risk  (8/22/2024)    Received from [facility]    Overall Financial Resource Strain (CARDIA)     Difficulty of Paying Living Expenses: Not very hard   Food Insecurity: No Food Insecurity (8/22/2024)    Received from [facility]    Hunger Vital Sign     Worried About Running Out of Food in the Last Year: Never true     Ran Out of Food in the Last Year: Never true   Transportation Needs: No Transportation Needs (8/22/2024)    Received from [facility]    PRAPARE - Transportation     Lack of Transportation (Medical): No     Lack of Transportation (Non-Medical): No   Physical Activity: Not on file   Stress: Not on file   Social Connections: Not on file   Intimate Partner Violence: Not At Risk (8/22/2024)    Received from [facility]    Humiliation, Afraid, Rape, and Kick questionnaire     Fear of Current or Ex-Partner: No     Emotionally Abused: No     Physically Abused: No     Sexually Abused: No   Housing Stability: Unknown (8/22/2024)    Received from [facility]    Housing Stability Vital Sign     Unable to Pay for Housing in the Last Year: No     Number of Times Moved in the Last Year: Not on file      "Homeless in the Last Year: No       Current Outpatient Medications   Medication Sig Dispense Refill    ALPRAZolam (XANAX) 0.25 mg tablet Take 1 tablet (0.25 mg total) by mouth if needed for anxiety (Take 30 minutes to 1 hour prior to flight) 6 tablet 0    atenolol (TENORMIN) 25 mg tablet TAKE 1 TABLET (25 MG TOTAL) BY MOUTH DAILY. 90 tablet 1    B Complex Vitamins (B COMPLEX 1 PO) Take by mouth      cetirizine (ZyrTEC) 10 mg tablet Take 10 mg by mouth daily      citalopram (CeleXA) 10 mg tablet TAKE 1 TABLET BY MOUTH EVERY DAY 90 tablet 1    Omega-3 Fatty Acids (Omega-3 Fish Oil) 500 MG CAPS Take 600 mg by mouth in the morning      glucosamine-chondroitin 500-400 MG tablet Take 1 tablet by mouth in the morning. (Patient not taking: Reported on 12/3/2024)       No current facility-administered medications for this visit.       Review of Systems     As noted in HPI    Objective      /78 (BP Location: Left arm, Patient Position: Sitting, Cuff Size: Large)   Pulse 70   Temp 98.1 °F (36.7 °C) (Temporal)   Resp 18   Ht 5' 1.5\" (1.562 m)   Wt 67.3 kg (148 lb 6.4 oz)   SpO2 97%   BMI 27.59 kg/m²     Physical Exam  Vitals reviewed.   Constitutional:       General: She is not in acute distress.     Appearance: Normal appearance.   HENT:      Head: Normocephalic and atraumatic.      Nose: Nose normal.      Mouth/Throat:      Mouth: Mucous membranes are moist.      Pharynx: Oropharynx is clear.   Eyes:      Extraocular Movements: Extraocular movements intact.      Conjunctiva/sclera: Conjunctivae normal.      Pupils: Pupils are equal, round, and reactive to light.   Cardiovascular:      Rate and Rhythm: Normal rate and regular rhythm.      Pulses: Normal pulses.      Heart sounds: Normal heart sounds.   Pulmonary:      Effort: Pulmonary effort is normal.      Breath sounds: Normal breath sounds.   Abdominal:      General: Abdomen is flat. Bowel sounds are normal.      Palpations: Abdomen is soft.      Tenderness: " "There is no abdominal tenderness.   Musculoskeletal:      Cervical back: Neck supple.      Right lower leg: No edema.      Left lower leg: No edema.   Skin:     General: Skin is warm and dry.   Neurological:      Mental Status: She is alert and oriented to person, place, and time.   Psychiatric:         Mood and Affect: Mood normal.         Behavior: Behavior normal.         Thought Content: Thought content normal.         Judgment: Judgment normal.             Some portions of this record may have been generated with voice recognition software. There may be translation, syntax, or grammatical errors. Occasional wrong word or \"sound-a-like\" substitutions may have occurred due to the inherent limitations of the voice recognition software. Read the chart carefully and recognize, using context, where substations may have occurred. If you have any questions, please contact the dictating provider for clarification or correction, as needed.  "

## 2024-12-03 NOTE — PROGRESS NOTES
Cardiology Follow Up    Shell Dey  1960  [id number]  [facility] [place]  [address]  [address]  [facility] [address] [phone]      Assessment & Plan  LBBB (left bundle branch block)  seen on EKG along with a PVC.  No prior EKGs for comparison.    Nuclear stress test revealed no significant ischemic changes with a normal LVEF.    Echocardiogram revealed normal LV size with ejection fraction of 50% with grade 1 diastolic dysfunction and mildly prolapsed posterior mitral valve leaflet with mild regurgitation.  Low normal ejection fraction is likely from the left bundle branch block.  No current symptoms to suggest need for additional testing currently  Mitral valve prolapse  echocardiogram revealed mildly prolapsed posterior leaflet with mild regurgitation, we will continue to follow this over time for symptom development as well as changes in valve morphology and/or level of regurgitation.  Currently asymptomatic and doing well  Nonrheumatic mitral valve regurgitation  Mild regurgitation seen on echocardiogram  Without symptoms to warrant additional testing  Continued on beta-blocker  PVC's (premature ventricular contractions)  Continued on atenolol, she has known about PVCs since 1988.    Symptoms are well controlled.  Mixed hyperlipidemia  Currently on omega-3 fatty acids.     in May 2023   in Nov 2024  Grade I diastolic dysfunction  Seen on echocardiogram  Continue blood pressure management with atenolol    Chief Complaint   Patient presents with    Follow-up     1 year f/up       Interval History:  Patient is here for cardiac evaluation of abnormal EKG with left bundle branch block, which she's known about since 1988.   Patient feels well, without complaints.  No reported chest pain, shortness of breath, palpitations, lightheadedness, syncope, LE edema, orthopnea, PND, or significant weight changes.  Patient remains active without any  "increased fatigue out of the ordinary.      Blood pressure 120/80, pulse 66, height 5' 1.5\" (1.562 m), weight 66.7 kg (147 lb), SpO2 96%, not currently breastfeeding.    EKG:  Sinus bradycardia  Left bundle branch block  Abnormal ECG    Review of Systems:  Review of Systems   Constitutional:  Negative for activity change, appetite change, chills, diaphoresis, fatigue and unexpected weight change.   HENT:  Negative for hearing loss, nosebleeds and sore throat.    Eyes:  Negative for photophobia and visual disturbance.   Respiratory:  Negative for cough, chest tightness, shortness of breath and wheezing.    Cardiovascular:  Negative for chest pain, palpitations and leg swelling.   Gastrointestinal:  Negative for abdominal pain, diarrhea, nausea and vomiting.   Endocrine: Negative for polyuria.   Genitourinary:  Negative for dysuria, frequency and hematuria.   Musculoskeletal:  Negative for arthralgias, back pain, gait problem and neck pain.   Skin:  Negative for pallor and rash.   Neurological:  Negative for dizziness, syncope and headaches.   Hematological:  Does not bruise/bleed easily.   Psychiatric/Behavioral:  Negative for behavioral problems and confusion.        Physical Exam:  Physical Exam  Vitals reviewed.   Constitutional:       Appearance: Normal appearance. She is well-developed. She is not ill-appearing or diaphoretic.   HENT:      Head: Normocephalic and atraumatic.      Nose: Nose normal.   Eyes:      General: No scleral icterus.     Extraocular Movements: Extraocular movements intact.      Pupils: Pupils are equal, round, and reactive to light.   Neck:      Vascular: No JVD.   Cardiovascular:      Rate and Rhythm: Normal rate and regular rhythm.      Heart sounds: Normal heart sounds. No murmur heard.     No friction rub. No gallop.   Pulmonary:      Effort: Pulmonary effort is normal. No respiratory distress.      Breath sounds: Normal breath sounds. No wheezing or rales.   Abdominal:      General: " Bowel sounds are normal. There is no distension.      Palpations: Abdomen is soft.      Tenderness: There is no abdominal tenderness.   Musculoskeletal:         General: No deformity. Normal range of motion.      Cervical back: Normal range of motion and neck supple.      Right lower leg: No edema.      Left lower leg: No edema.   Skin:     General: Skin is warm and dry.      Findings: No rash.   Neurological:      Mental Status: She is alert and oriented to person, place, and time.      Cranial Nerves: No cranial nerve deficit.   Psychiatric:         Mood and Affect: Mood normal.         Behavior: Behavior normal.         Patient Active Problem List   Diagnosis    Mitral valve prolapse    SI (sacroiliac) pain    Uterine leiomyoma    Anxiety    Fear of flying    Seasonal allergies    PVC's (premature ventricular contractions)    Positive MITA (antinuclear antibody)    Neck pain    Elevated LDL cholesterol level    LBBB (left bundle branch block)    Nonrheumatic mitral valve regurgitation    Grade I diastolic dysfunction    Lung nodule seen on imaging study    Hyperlipidemia    Gastritis     Past Medical History:   Diagnosis Date    Abnormal ECG 04/2009    Anxiety     Arthritis     Fibroids     GERD (gastroesophageal reflux disease)     Heart disease     Heart murmur 1988    Heart valve disease     HL (hearing loss)     Deaf right ear    Shingles     Varicella      Social History     Socioeconomic History    Marital status: /Civil Union     Spouse name: Not on file    Number of children: Not on file    Years of education: Not on file    Highest education level: Not on file   Occupational History    Not on file   Tobacco Use    Smoking status: Former     Current packs/day: 0.25     Average packs/day: 0.3 packs/day for 10.0 years (2.5 ttl pk-yrs)     Types: Cigarettes    Smokeless tobacco: Never   Vaping Use    Vaping status: Never Used   Substance and Sexual Activity    Alcohol use: Yes     Alcohol/week: 4.0  standard drinks of alcohol     Types: 2 Glasses of wine, 2 Cans of beer per week     Comment: socially     Drug use: Never    Sexual activity: Yes     Partners: Male     Birth control/protection: Post-menopausal   Other Topics Concern    Not on file   Social History Narrative    Not on file     Social Drivers of Health     Financial Resource Strain: Low Risk  (8/22/2024)    Received from [facility]    Overall Financial Resource Strain (CARDIA)     Difficulty of Paying Living Expenses: Not very hard   Food Insecurity: No Food Insecurity (8/22/2024)    Received from [facility]    Hunger Vital Sign     Worried About Running Out of Food in the Last Year: Never true     Ran Out of Food in the Last Year: Never true   Transportation Needs: No Transportation Needs (8/22/2024)    Received from [facility]    PRAPARE - Transportation     Lack of Transportation (Medical): No     Lack of Transportation (Non-Medical): No   Physical Activity: Not on file   Stress: Not on file   Social Connections: Not on file   Intimate Partner Violence: Not At Risk (8/22/2024)    Received from [facility]    Humiliation, Afraid, Rape, and Kick questionnaire     Fear of Current or Ex-Partner: No     Emotionally Abused: No     Physically Abused: No     Sexually Abused: No   Housing Stability: Unknown (8/22/2024)    Received from [facility]    Housing Stability Vital Sign     Unable to Pay for Housing in the Last Year: No     Number of Times Moved in the Last Year: Not on file     Homeless in the Last Year: No      Family History   Problem Relation Age of Onset    Breast cancer Mother 80    Cancer Mother     Hearing loss Mother     Colon cancer Father     Coronary artery disease Father     COPD Father     Cancer Father     BRCA1 Negative Sister     BRCA2 Negative Sister     No Known Problems Sister     No Known Problems Sister     Breast cancer Maternal  Grandmother 30    Breast cancer Maternal Grandfather     Breast cancer Paternal Grandmother     Breast cancer Paternal Grandfather     Breast cancer Family     Colon cancer Family     No Known Problems Son     Stomach cancer Maternal Aunt     Ovarian cancer Neg Hx     Uterine cancer Neg Hx     Cervical cancer Neg Hx      Past Surgical History:   Procedure Laterality Date    BREAST CYST ASPIRATION      CARDIAC CATHETERIZATION      DILATION AND EVACUATION      TONSILECTOMY AND ADNOIDECTOMY      TUBAL LIGATION         Current Outpatient Medications:     ALPRAZolam (XANAX) 0.25 mg tablet, Take 1 tablet (0.25 mg total) by mouth if needed for anxiety (Take 30 minutes to 1 hour prior to flight), Disp: 6 tablet, Rfl: 0    atenolol (TENORMIN) 25 mg tablet, TAKE 1 TABLET (25 MG TOTAL) BY MOUTH DAILY., Disp: 90 tablet, Rfl: 1    B Complex Vitamins (B COMPLEX 1 PO), Take by mouth, Disp: , Rfl:     cetirizine (ZyrTEC) 10 mg tablet, Take 10 mg by mouth daily, Disp: , Rfl:     citalopram (CeleXA) 10 mg tablet, TAKE 1 TABLET BY MOUTH EVERY DAY, Disp: 90 tablet, Rfl: 1    Omega-3 Fatty Acids (Omega-3 Fish Oil) 500 MG CAPS, Take 600 mg by mouth in the morning, Disp: , Rfl:   No Known Allergies    Labs:  Appointment on 11/07/2024   Component Date Value    Sodium 11/07/2024 142     Potassium 11/07/2024 3.8     Chloride 11/07/2024 105     CO2 11/07/2024 30     ANION GAP 11/07/2024 7     BUN 11/07/2024 15     Creatinine 11/07/2024 0.68     Glucose, Fasting 11/07/2024 92     Calcium 11/07/2024 9.2     AST 11/07/2024 21     ALT 11/07/2024 22     Alkaline Phosphatase 11/07/2024 54     Total Protein 11/07/2024 6.9     Albumin 11/07/2024 4.4     Total Bilirubin 11/07/2024 0.85     eGFR 11/07/2024 92     Cholesterol 11/07/2024 234 (H)     Triglycerides 11/07/2024 101     HDL, Direct 11/07/2024 62     LDL Calculated 11/07/2024 152 (H)    Appointment on 06/10/2024   Component Date Value    TSH 3RD GENERATON 06/10/2024 1.301      Lab Results    Component Value Date    CHOL 202 08/18/2015    TRIG 101 11/07/2024    TRIG 66 08/18/2015    HDL 62 11/07/2024    HDL 83 08/18/2015     Imaging: No results found.

## 2024-12-03 NOTE — ASSESSMENT & PLAN NOTE
echocardiogram revealed mildly prolapsed posterior leaflet with mild regurgitation, we will continue to follow this over time for symptom development as well as changes in valve morphology and/or level of regurgitation.  Currently asymptomatic and doing well

## 2025-02-27 DIAGNOSIS — Z79.899 CURRENT USE OF BETA BLOCKER: ICD-10-CM

## 2025-02-27 DIAGNOSIS — F41.9 ANXIETY: ICD-10-CM

## 2025-02-27 RX ORDER — ATENOLOL 25 MG/1
25 TABLET ORAL DAILY
Qty: 90 TABLET | Refills: 1 | Status: SHIPPED | OUTPATIENT
Start: 2025-02-27

## 2025-02-27 RX ORDER — CITALOPRAM HYDROBROMIDE 10 MG/1
10 TABLET ORAL DAILY
Qty: 90 TABLET | Refills: 1 | Status: SHIPPED | OUTPATIENT
Start: 2025-02-27

## 2025-03-04 ENCOUNTER — ANNUAL EXAM (OUTPATIENT)
Dept: OBGYN CLINIC | Facility: CLINIC | Age: 65
End: 2025-03-04
Payer: COMMERCIAL

## 2025-03-04 VITALS
SYSTOLIC BLOOD PRESSURE: 136 MMHG | HEIGHT: 62 IN | WEIGHT: 151.2 LBS | BODY MASS INDEX: 27.82 KG/M2 | DIASTOLIC BLOOD PRESSURE: 82 MMHG

## 2025-03-04 DIAGNOSIS — Z13.820 ENCOUNTER FOR OSTEOPOROSIS SCREENING IN ASYMPTOMATIC POSTMENOPAUSAL PATIENT: ICD-10-CM

## 2025-03-04 DIAGNOSIS — Z01.419 ENCOUNTER FOR GYNECOLOGICAL EXAMINATION: Primary | ICD-10-CM

## 2025-03-04 DIAGNOSIS — Z78.0 ENCOUNTER FOR OSTEOPOROSIS SCREENING IN ASYMPTOMATIC POSTMENOPAUSAL PATIENT: ICD-10-CM

## 2025-03-04 DIAGNOSIS — Z12.31 ENCOUNTER FOR SCREENING MAMMOGRAM FOR MALIGNANT NEOPLASM OF BREAST: ICD-10-CM

## 2025-03-04 PROCEDURE — 99396 PREV VISIT EST AGE 40-64: CPT | Performed by: OBSTETRICS & GYNECOLOGY

## 2025-03-04 RX ORDER — OMEPRAZOLE 40 MG/1
CAPSULE, DELAYED RELEASE ORAL
COMMUNITY
Start: 2025-02-25

## 2025-03-04 NOTE — PROGRESS NOTES
Name: Shell Dey      : 1960      MRN: [id number]  Encounter Provider: Ambar Rocha MD  Encounter Date: 3/4/2025   Encounter department: [facility]  :  Assessment & Plan  Encounter for gynecological examination  Pap/HPV current  Mammogram ordered  Colonoscopy current     Encourage healthy diet, exercise, Calcium 1200mg per day and at least 800 iu Vitamin D daily.             Encounter for osteoporosis screening in asymptomatic postmenopausal patient    Orders:    DXA bone density spine hip and pelvis; Future    Encounter for screening mammogram for malignant neoplasm of breast    Orders:    Mammo screening bilateral w 3d and cad; Future        History of Present Illness   Doing well. Retired. Exercises regularly. Very active.       Gynecologic Exam  She reports no genital itching, genital lesions, genital odor, genital rash, pelvic pain, vaginal bleeding or vaginal discharge. The patient is experiencing no pain. Pertinent negatives include no chills, constipation, diarrhea, fever, nausea, sore throat or vomiting.     Shell Dey is a 64 y.o. female who presents for a routine annual visit  Last Pap Smear- 21 neg/neg    Mammogram- 9/3/24  Colonoscopy- 10/19/21 5 yr rpt  Dexa- due @ 65 (10/17/25)    Currently sexually active  Mother breast cancer  Father colon cancer     Maternal aunt passed away in December from ovarian cancer      Review of Systems   Constitutional:  Negative for activity change, appetite change, chills, fatigue and fever.   HENT:  Negative for rhinorrhea, sneezing and sore throat.    Eyes:  Negative for visual disturbance.   Respiratory:  Negative for cough, shortness of breath and wheezing.    Cardiovascular:  Negative for chest pain, palpitations and leg swelling.   Gastrointestinal:  Negative for abdominal distention, constipation, diarrhea, nausea and vomiting.   Genitourinary:  Negative for difficulty urinating,  "pelvic pain and vaginal discharge.   Neurological:  Negative for syncope and light-headedness.          Objective   /82 (BP Location: Left arm, Patient Position: Sitting, Cuff Size: Standard)   Ht 5' 1.5\" (1.562 m)   Wt 68.6 kg (151 lb 3.2 oz)   BMI 28.11 kg/m²      Physical Exam  Chest:   Breasts:     Breasts are symmetrical.      Right: No inverted nipple, mass, nipple discharge, skin change or tenderness.      Left: No inverted nipple, mass, nipple discharge, skin change or tenderness.   Genitourinary:     Labia:         Right: No rash, tenderness, lesion or injury.         Left: No rash, tenderness, lesion or injury.       Vagina: Normal. No vaginal discharge, erythema, tenderness or bleeding.      Cervix: No cervical motion tenderness, discharge, friability, erythema or cervical bleeding.      Uterus: Not deviated, not enlarged, not fixed and not tender.       Adnexa:         Right: No mass, tenderness or fullness.          Left: No mass, tenderness or fullness.     Neurological:      Mental Status: She is alert and oriented to person, place, and time.           "

## 2025-03-05 ENCOUNTER — OFFICE VISIT (OUTPATIENT)
Age: 65
End: 2025-03-05
Payer: COMMERCIAL

## 2025-03-05 VITALS
BODY MASS INDEX: 27.38 KG/M2 | WEIGHT: 148.8 LBS | TEMPERATURE: 97.4 F | DIASTOLIC BLOOD PRESSURE: 68 MMHG | SYSTOLIC BLOOD PRESSURE: 106 MMHG | HEIGHT: 62 IN | OXYGEN SATURATION: 97 % | HEART RATE: 57 BPM

## 2025-03-05 DIAGNOSIS — D22.5 MULTIPLE BENIGN NEVI OF UPPER EXTREMITY, LOWER EXTREMITY, AND TRUNK: ICD-10-CM

## 2025-03-05 DIAGNOSIS — D22.70 MULTIPLE BENIGN NEVI OF UPPER EXTREMITY, LOWER EXTREMITY, AND TRUNK: ICD-10-CM

## 2025-03-05 DIAGNOSIS — L85.3 XEROSIS OF SKIN: Primary | ICD-10-CM

## 2025-03-05 DIAGNOSIS — D18.01 CHERRY ANGIOMA: ICD-10-CM

## 2025-03-05 DIAGNOSIS — L82.1 SEBORRHEIC KERATOSIS: ICD-10-CM

## 2025-03-05 DIAGNOSIS — D22.60 MULTIPLE BENIGN NEVI OF UPPER EXTREMITY, LOWER EXTREMITY, AND TRUNK: ICD-10-CM

## 2025-03-05 PROCEDURE — 99213 OFFICE O/P EST LOW 20 MIN: CPT

## 2025-03-05 NOTE — PATIENT INSTRUCTIONS
"MELANOCYTIC NEVI (\"Moles\")    Assessment and Plan:  Based on a thorough discussion of this condition and the management approach to it (including a comprehensive discussion of the known risks, side effects and potential benefits of treatment), the patient (family) agrees to implement the following specific plan:  When outside we recommend using a wide brim hat, sunglasses, long sleeve and pants, sunscreen with SPF 30+ with reapplication every 2 hours, or SPF specific clothing   Benign, reassured  Annual skin check     Melanocytic Nevi  Melanocytic nevi (\"moles\") are tan or brown, raised or flat areas of the skin which have an increased number of melanocytes. Melanocytes are the cells in our body which make pigment and account for skin color.    Some moles are present at birth (I.e., \"congenital nevi\"), while others come up later in life (i.e., \"acquired nevi\").  The sun can stimulate the body to make more moles.  Sunburns are not the only thing that triggers more moles.  Chronic sun exposure can do it too.     Clinically distinguishing a healthy mole from melanoma may be difficult, even for experienced dermatologists. The \"ABCDE's\" of moles have been suggested as a means of helping to alert a person to a suspicious mole and the possible increased risk of melanoma.  The suggestions for raising alert are as follows:    Asymmetry: Healthy moles tend to be symmetric, while melanomas are often asymmetric.  Asymmetry means if you draw a line through the mole, the two halves do not match in color, size, shape, or surface texture. Asymmetry can be a result of rapid enlargement of a mole, the development of a raised area on a previously flat lesion, scaling, ulceration, bleeding or scabbing within the mole.  Any mole that starts to demonstrate \"asymmetry\" should be examined promptly by a board certified dermatologist.     Border: Healthy moles tend to have discrete, even borders.  The border of a melanoma often blends into " "the normal skin and does not sharply delineate the mole from normal skin.  Any mole that starts to demonstrate \"uneven borders\" should be examined promptly by a board certified dermatologist.     Color: Healthy moles tend to be one color throughout.  Melanomas tend to be made up of different colors ranging from dark black, blue, white, or red.  Any mole that demonstrates a color change should be examined promptly by a board certified dermatologist.     Diameter: Healthy moles tend to be smaller than 0.6 cm in size; an exception are \"congenital nevi\" that can be larger.  Melanomas tend to grow and can often be greater than 0.6 cm (1/4 of an inch, or the size of a pencil eraser). This is only a guideline, and many normal moles may be larger than 0.6 cm without being unhealthy.  Any mole that starts to change in size (small to bigger or bigger to smaller) should be examined promptly by a board certified dermatologist.     Evolving: Healthy moles tend to \"stay the same.\"  Melanomas may often show signs of change or evolution such as a change in size, shape, color, or elevation.  Any mole that starts to itch, bleed, crust, burn, hurt, or ulcerate or demonstrate a change or evolution should be examined promptly by a board certified dermatologist.     ALONSO ANGIOMAS    Assessment and Plan:  Based on a thorough discussion of this condition and the management approach to it (including a comprehensive discussion of the known risks, side effects and potential benefits of treatment), the patient (family) agrees to implement the following specific plan:  Monitor for changes  Benign, reassured      Assessment and Plan:    Cherry angioma, also known as Lester de Camilo spots, are benign vascular skin lesions. A \"cherry angioma\" is a firm red, blue or purple papule, 0.1-1 cm in diameter. When thrombosed, they can appear black in colour until evaluated with a dermatoscope when the red or purple colour is more easily seen. Alonso " angioma may develop on any part of the body but most often appear on the scalp, face, lips and trunk.  An angioma is due to proliferating endothelial cells; these are the cells that line the inside of a blood vessel.    Angiomas can arise in early life or later in life; the most common type of angioma is a cherry angioma.  Cherry angiomas are very common in males and females of any age or race. They are more noticeable in white skin than in skin of colour. They markedly increase in number from about the age of 40. There may be a family history of similar lesions. Eruptive cherry angiomas have been rarely reported to be associated with internal malignancy. The cause of angiomas is unknown. Genetic analysis of cherry angiomas has shown that they frequently carry specific somatic missense mutations in the GNAQ and GNA11 (Q209H) genes, which are involved in other vascular and melanocytic proliferations.      SEBORRHEIC KERATOSIS; NON-INFLAMED    Assessment and Plan:  Based on a thorough discussion of this condition and the management approach to it (including a comprehensive discussion of the known risks, side effects and potential benefits of treatment), the patient (family) agrees to implement the following specific plan:  Monitor for changes  Benign, reassured      Seborrheic Keratosis  A seborrheic keratosis is a harmless warty spot that appears during adult life as a common sign of skin aging.  Seborrheic keratoses can arise on any area of skin, covered or uncovered, with the usual exception of the palms and soles. They do not arise from mucous membranes. Seborrheic keratoses can have highly variable appearance.      Seborrheic keratoses are extremely common. It has been estimated that over 90% of adults over the age of 60 years have one or more of them. They occur in males and females of all races, typically beginning to erupt in the 30s or 40s. They are uncommon under the age of 20 years.  The precise cause of  "seborrhoeic keratoses is not known.  Seborrhoeic keratoses are considered degenerative in nature. As time goes by, seborrheic keratoses tend to become more numerous. Some people inherit a tendency to develop a very large number of them; some people may have hundreds of them.      There is no easy way to remove multiple lesions on a single occasion.  Unless a specific lesion is \"inflamed\" and is causing pain or stinging/burning or is bleeding, most insurance companies do not authorize treatment.    XEROSIS (\"DRY SKIN\")    Assessment and Plan:  Based on a thorough discussion of this condition and the management approach to it (including a comprehensive discussion of the known risks, side effects and potential benefits of treatment), the patient (family) agrees to implement the following specific plan:  Use moisturizer like Eucerin,Cerave or Aveeno Cream 3 times a day for the dry skin            Dry skin refers to skin that feels dry to touch. Dry skin has a dull surface with a rough, scaly quality. The skin is less pliable and cracked. When dryness is severe, the skin may become inflamed and fissured.  Although any body site can be dry, dry skin tends to affect the shins more than any other site.    Dry skin is lacking moisture in the outer horny cell layer (stratum corneum) and this results in cracks in the skin surface.  Dry skin is also called xerosis, xeroderma or asteatosis (lack of fat).  It can affect males and females of all ages. There is some racial variability in water and lipid content of the skin.  Dry skin that starts in early childhood may be one of about 20 types of ichthyosis (fish-scale skin). There is often a family history of dry skin.   Dry skin is commonly seen in people with atopic dermatitis.  Nearly everyone > 60 years has dry skin.    Dry skin that begins later may be seen in people with certain diseases and conditions.  Postmenopausal women  Hypothyroidism  Chronic renal disease "   Malnutrition and weight loss   Subclinical dermatitis   Treatment with certain drugs such as oral retinoids, diuretics and epidermal growth factor receptor inhibitors      What is the treatment for dry skin?  The mainstay of treatment of dry skin and ichthyosis is moisturisers/emollients. They should be applied liberally and often enough to:  Reduce itch   Improve the barrier function   Prevent entry of irritants, bacteria   Reduce transepidermal water loss.      How can dry skin be prevented?  Eliminate aggravating factors:  Reduce the frequency of bathing.   A humidifier in winter and air conditioner in summer   Compare having a short shower with a prolonged soak in a bath.   Use lukewarm, not hot, water.   Replace standard soap with a substitute such as a synthetic detergent cleanser, water-miscible emollient, bath oil, anti-pruritic tar oil, colloidal oatmeal etc.   Apply an emollient liberally and often, particularly shortly after bathing, and when itchy. The drier the skin, the thicker this should be, especially on the hands.    What is the outlook for dry skin?  A tendency to dry skin may persist life-long, or it may improve once contributing factors are controlled.

## 2025-03-05 NOTE — PROGRESS NOTES
"[facility] Note     Patient Name: Shell Dey  Encounter Date: 03/05/2025     Have you been cared for by a [facility] Dermatologist in the last 3 years and, if so, which description applies to you?    Yes.  I have been here within the last 3 years, and my medical history has NOT changed since that time.  I am FEMALE/of child-bearing potential.    REVIEW OF SYSTEMS:  Have you recently had or currently have any of the following? No changes in my recent health.   PAST MEDICAL HISTORY:  Have you personally ever had or currently have any of the following?  If \"YES,\" then please provide more detail. No changes in my medical history.   HISTORY OF IMMUNOSUPPRESSION: Do you have a history of any of the following:  Systemic Immunosuppression such as Diabetes, Biologic or Immunotherapy, Chemotherapy, Organ Transplantation, Bone Marrow Transplantation or Prednisone?  No     Answering \"YES\" requires the addition of the dotphrase \"IMMUNOSUPPRESSED\" as the first diagnosis of the patient's visit.   FAMILY HISTORY:  Any \"first degree relatives\" (parent, brother, sister, or child) with the following?    No changes in my family's known health.   PATIENT EXPERIENCE:    Do you want the Dermatologist to perform a COMPLETE skin exam today including a clinical examination under the \"bra and underwear\" areas?  Yes  If necessary, do we have your permission to call and leave a detailed message on your Preferred Phone number that includes your specific medical information?  Yes      No Known Allergies   Current Outpatient Medications:   •  ALPRAZolam (XANAX) 0.25 mg tablet, Take 1 tablet (0.25 mg total) by mouth if needed for anxiety (Take 30 minutes to 1 hour prior to flight), Disp: 6 tablet, Rfl: 0  •  atenolol (TENORMIN) 25 mg tablet, TAKE 1 TABLET (25 MG TOTAL) BY MOUTH DAILY., Disp: 90 tablet, Rfl: 1  •  B Complex Vitamins (B COMPLEX 1 PO), Take by mouth, Disp: , Rfl:   •  cetirizine (ZyrTEC) 10 mg tablet, Take 10 mg " "by mouth daily, Disp: , Rfl:   •  citalopram (CeleXA) 10 mg tablet, TAKE 1 TABLET BY MOUTH EVERY DAY, Disp: 90 tablet, Rfl: 1  •  Omega-3 Fatty Acids (Omega-3 Fish Oil) 500 MG CAPS, Take 600 mg by mouth in the morning, Disp: , Rfl:   •  omeprazole (PriLOSEC) 40 MG capsule, , Disp: , Rfl:           Whom besides the patient is providing clinical information about today's encounter?   NO ADDITIONAL HISTORIAN (patient alone provided history)    Physical Exam and Assessment/Plan by Diagnosis:    MELANOCYTIC NEVI (\"Moles\")    Physical Exam:  Anatomic Location Affected:   Mostly on sun-exposed areas of the trunk and extremities  Morphological Description:  Scattered, 1-4mm round to ovoid, symmetrical-appearing, even bordered, skin colored to dark brown macules/papules, mostly in sun-exposed areas  Pertinent Positives:  Pertinent Negatives:    Additional History of Present Condition:      Assessment and Plan:  Based on a thorough discussion of this condition and the management approach to it (including a comprehensive discussion of the known risks, side effects and potential benefits of treatment), the patient (family) agrees to implement the following specific plan:  When outside we recommend using a wide brim hat, sunglasses, long sleeve and pants, sunscreen with SPF 30+ with reapplication every 2 hours, or SPF specific clothing   Benign, reassured  Annual skin check     Melanocytic Nevi  Melanocytic nevi (\"moles\") are tan or brown, raised or flat areas of the skin which have an increased number of melanocytes. Melanocytes are the cells in our body which make pigment and account for skin color.    Some moles are present at birth (I.e., \"congenital nevi\"), while others come up later in life (i.e., \"acquired nevi\").  The sun can stimulate the body to make more moles.  Sunburns are not the only thing that triggers more moles.  Chronic sun exposure can do it too.     Clinically distinguishing a healthy mole from melanoma may " "be difficult, even for experienced dermatologists. The \"ABCDE's\" of moles have been suggested as a means of helping to alert a person to a suspicious mole and the possible increased risk of melanoma.  The suggestions for raising alert are as follows:    Asymmetry: Healthy moles tend to be symmetric, while melanomas are often asymmetric.  Asymmetry means if you draw a line through the mole, the two halves do not match in color, size, shape, or surface texture. Asymmetry can be a result of rapid enlargement of a mole, the development of a raised area on a previously flat lesion, scaling, ulceration, bleeding or scabbing within the mole.  Any mole that starts to demonstrate \"asymmetry\" should be examined promptly by a board certified dermatologist.     Border: Healthy moles tend to have discrete, even borders.  The border of a melanoma often blends into the normal skin and does not sharply delineate the mole from normal skin.  Any mole that starts to demonstrate \"uneven borders\" should be examined promptly by a board certified dermatologist.     Color: Healthy moles tend to be one color throughout.  Melanomas tend to be made up of different colors ranging from dark black, blue, white, or red.  Any mole that demonstrates a color change should be examined promptly by a board certified dermatologist.     Diameter: Healthy moles tend to be smaller than 0.6 cm in size; an exception are \"congenital nevi\" that can be larger.  Melanomas tend to grow and can often be greater than 0.6 cm (1/4 of an inch, or the size of a pencil eraser). This is only a guideline, and many normal moles may be larger than 0.6 cm without being unhealthy.  Any mole that starts to change in size (small to bigger or bigger to smaller) should be examined promptly by a board certified dermatologist.     Evolving: Healthy moles tend to \"stay the same.\"  Melanomas may often show signs of change or evolution such as a change in size, shape, color, or " "elevation.  Any mole that starts to itch, bleed, crust, burn, hurt, or ulcerate or demonstrate a change or evolution should be examined promptly by a board certified dermatologist.     PYLE ANGIOMAS    Physical Exam:  Anatomic Location Affected:  trunk  Morphological Description:  Scattered cherry red, 1-4 mm papules.  Pertinent Positives:  Pertinent Negatives:    Additional History of Present Condition:      Assessment and Plan:  Based on a thorough discussion of this condition and the management approach to it (including a comprehensive discussion of the known risks, side effects and potential benefits of treatment), the patient (family) agrees to implement the following specific plan:  Monitor for changes  Benign, reassured    Assessment and Plan:    Cherry angioma, also known as Lester de Camilo spots, are benign vascular skin lesions. A \"cherry angioma\" is a firm red, blue or purple papule, 0.1-1 cm in diameter. When thrombosed, they can appear black in colour until evaluated with a dermatoscope when the red or purple colour is more easily seen. Cherry angioma may develop on any part of the body but most often appear on the scalp, face, lips and trunk.  An angioma is due to proliferating endothelial cells; these are the cells that line the inside of a blood vessel.    Angiomas can arise in early life or later in life; the most common type of angioma is a cherry angioma.  Cherry angiomas are very common in males and females of any age or race. They are more noticeable in white skin than in skin of colour. They markedly increase in number from about the age of 40. There may be a family history of similar lesions. Eruptive cherry angiomas have been rarely reported to be associated with internal malignancy. The cause of angiomas is unknown. Genetic analysis of cherry angiomas has shown that they frequently carry specific somatic missense mutations in the GNAQ and GNA11 (Q209H) genes, which are involved in other " "vascular and melanocytic proliferations.      SEBORRHEIC KERATOSIS; NON-INFLAMED    Physical Exam:  Anatomic Location Affected:  trunk, extremities  Morphological Description:  Flat and raised, waxy, smooth to warty textured, yellow to brownish-grey to dark brown to blackish, discrete, \"stuck-on\" appearing papules.  Pertinent Positives:  Pertinent Negatives:    Additional History of Present Condition:      Assessment and Plan:  Based on a thorough discussion of this condition and the management approach to it (including a comprehensive discussion of the known risks, side effects and potential benefits of treatment), the patient (family) agrees to implement the following specific plan:  Monitor for changes  Benign, reassured      Seborrheic Keratosis  A seborrheic keratosis is a harmless warty spot that appears during adult life as a common sign of skin aging.  Seborrheic keratoses can arise on any area of skin, covered or uncovered, with the usual exception of the palms and soles. They do not arise from mucous membranes. Seborrheic keratoses can have highly variable appearance.      Seborrheic keratoses are extremely common. It has been estimated that over 90% of adults over the age of 60 years have one or more of them. They occur in males and females of all races, typically beginning to erupt in the 30s or 40s. They are uncommon under the age of 20 years.  The precise cause of seborrhoeic keratoses is not known.  Seborrhoeic keratoses are considered degenerative in nature. As time goes by, seborrheic keratoses tend to become more numerous. Some people inherit a tendency to develop a very large number of them; some people may have hundreds of them.      There is no easy way to remove multiple lesions on a single occasion.  Unless a specific lesion is \"inflamed\" and is causing pain or stinging/burning or is bleeding, most insurance companies do not authorize treatment.    XEROSIS (\"DRY SKIN\")    Physical " Exam:  Anatomic Location Affected:  diffuse  Morphological Description:  xerosis  Pertinent Positives:  Pertinent Negatives:    Additional History of Present Condition:      Assessment and Plan:  Based on a thorough discussion of this condition and the management approach to it (including a comprehensive discussion of the known risks, side effects and potential benefits of treatment), the patient (family) agrees to implement the following specific plan:  Use moisturizer like Eucerin,Cerave or Aveeno Cream 3 times a day for the dry skin            Dry skin refers to skin that feels dry to touch. Dry skin has a dull surface with a rough, scaly quality. The skin is less pliable and cracked. When dryness is severe, the skin may become inflamed and fissured.  Although any body site can be dry, dry skin tends to affect the shins more than any other site.    Dry skin is lacking moisture in the outer horny cell layer (stratum corneum) and this results in cracks in the skin surface.  Dry skin is also called xerosis, xeroderma or asteatosis (lack of fat).  It can affect males and females of all ages. There is some racial variability in water and lipid content of the skin.  Dry skin that starts in early childhood may be one of about 20 types of ichthyosis (fish-scale skin). There is often a family history of dry skin.   Dry skin is commonly seen in people with atopic dermatitis.  Nearly everyone > 60 years has dry skin.    Dry skin that begins later may be seen in people with certain diseases and conditions.  Postmenopausal women  Hypothyroidism  Chronic renal disease   Malnutrition and weight loss   Subclinical dermatitis   Treatment with certain drugs such as oral retinoids, diuretics and epidermal growth factor receptor inhibitors      What is the treatment for dry skin?  The mainstay of treatment of dry skin and ichthyosis is moisturisers/emollients. They should be applied liberally and often enough to:  Reduce itch    Improve the barrier function   Prevent entry of irritants, bacteria   Reduce transepidermal water loss.      How can dry skin be prevented?  Eliminate aggravating factors:  Reduce the frequency of bathing.   A humidifier in winter and air conditioner in summer   Compare having a short shower with a prolonged soak in a bath.   Use lukewarm, not hot, water.   Replace standard soap with a substitute such as a synthetic detergent cleanser, water-miscible emollient, bath oil, anti-pruritic tar oil, colloidal oatmeal etc.   Apply an emollient liberally and often, particularly shortly after bathing, and when itchy. The drier the skin, the thicker this should be, especially on the hands.    What is the outlook for dry skin?  A tendency to dry skin may persist life-long, or it may improve once contributing factors are controlled.   Scribe Attestation    I,:  Karin Shay MA am acting as a scribe while in the presence of the attending physician.:       I,:  Karen Negrete PA-C personally performed the services described in this documentation    as scribed in my presence.:

## 2025-03-30 ENCOUNTER — APPOINTMENT (EMERGENCY)
Dept: CT IMAGING | Facility: HOSPITAL | Age: 65
End: 2025-03-30
Payer: COMMERCIAL

## 2025-03-30 ENCOUNTER — APPOINTMENT (EMERGENCY)
Dept: RADIOLOGY | Facility: HOSPITAL | Age: 65
End: 2025-03-30
Payer: COMMERCIAL

## 2025-03-30 ENCOUNTER — HOSPITAL ENCOUNTER (INPATIENT)
Facility: HOSPITAL | Age: 65
LOS: 1 days | Discharge: HOME | End: 2025-03-31
Attending: EMERGENCY MEDICINE | Admitting: HOSPITALIST
Payer: COMMERCIAL

## 2025-03-30 DIAGNOSIS — R11.2 NAUSEA AND VOMITING: ICD-10-CM

## 2025-03-30 DIAGNOSIS — K52.9 ENTERITIS: ICD-10-CM

## 2025-03-30 DIAGNOSIS — R10.13 EPIGASTRIC PAIN: Primary | ICD-10-CM

## 2025-03-30 DIAGNOSIS — R79.89 ELEVATED LACTIC ACID LEVEL: ICD-10-CM

## 2025-03-30 PROBLEM — R10.9 ABDOMINAL PAIN: Status: ACTIVE | Noted: 2025-03-30

## 2025-03-30 LAB
2HR DELTA HS TROPONIN: 0 NG/L
ALBUMIN SERPL BCG-MCNC: 4.6 G/DL (ref 3.5–5)
ALP SERPL-CCNC: 65 U/L (ref 34–104)
ALT SERPL W P-5'-P-CCNC: 28 U/L (ref 7–52)
ANION GAP SERPL CALCULATED.3IONS-SCNC: 12 MMOL/L (ref 4–13)
AST SERPL W P-5'-P-CCNC: 23 U/L (ref 13–39)
ATRIAL RATE: 67 BPM
BASOPHILS # BLD AUTO: 0.04 THOUSANDS/ÂΜL (ref 0–0.1)
BASOPHILS NFR BLD AUTO: 0 % (ref 0–1)
BILIRUB SERPL-MCNC: 0.76 MG/DL (ref 0.2–1)
BILIRUB UR QL STRIP: NEGATIVE
BUN SERPL-MCNC: 12 MG/DL (ref 5–25)
CALCIUM SERPL-MCNC: 9.6 MG/DL (ref 8.4–10.2)
CARDIAC TROPONIN I PNL SERPL HS: 3 NG/L (ref ?–50)
CARDIAC TROPONIN I PNL SERPL HS: 3 NG/L (ref ?–50)
CHLORIDE SERPL-SCNC: 103 MMOL/L (ref 96–108)
CLARITY UR: CLEAR
CO2 SERPL-SCNC: 25 MMOL/L (ref 21–32)
COLOR UR: COLORLESS
CREAT SERPL-MCNC: 0.67 MG/DL (ref 0.6–1.3)
EOSINOPHIL # BLD AUTO: 0.17 THOUSAND/ÂΜL (ref 0–0.61)
EOSINOPHIL NFR BLD AUTO: 2 % (ref 0–6)
ERYTHROCYTE [DISTWIDTH] IN BLOOD BY AUTOMATED COUNT: 13.3 % (ref 11.6–15.1)
GFR SERPL CREATININE-BSD FRML MDRD: 93 ML/MIN/1.73SQ M
GLUCOSE SERPL-MCNC: 142 MG/DL (ref 65–140)
GLUCOSE UR STRIP-MCNC: NEGATIVE MG/DL
HCT VFR BLD AUTO: 43.6 % (ref 34.8–46.1)
HGB BLD-MCNC: 14.8 G/DL (ref 11.5–15.4)
HGB UR QL STRIP.AUTO: NEGATIVE
IMM GRANULOCYTES # BLD AUTO: 0.05 THOUSAND/UL (ref 0–0.2)
IMM GRANULOCYTES NFR BLD AUTO: 1 % (ref 0–2)
KETONES UR STRIP-MCNC: NEGATIVE MG/DL
LACTATE SERPL-SCNC: 1.1 MMOL/L (ref 0.5–2)
LACTATE SERPL-SCNC: 2.3 MMOL/L (ref 0.5–2)
LACTATE SERPL-SCNC: 2.8 MMOL/L (ref 0.5–2)
LEUKOCYTE ESTERASE UR QL STRIP: NEGATIVE
LIPASE SERPL-CCNC: 11 U/L (ref 11–82)
LYMPHOCYTES # BLD AUTO: 1.03 THOUSANDS/ÂΜL (ref 0.6–4.47)
LYMPHOCYTES NFR BLD AUTO: 10 % (ref 14–44)
MCH RBC QN AUTO: 29.1 PG (ref 26.8–34.3)
MCHC RBC AUTO-ENTMCNC: 33.9 G/DL (ref 31.4–37.4)
MCV RBC AUTO: 86 FL (ref 82–98)
MONOCYTES # BLD AUTO: 0.31 THOUSAND/ÂΜL (ref 0.17–1.22)
MONOCYTES NFR BLD AUTO: 3 % (ref 4–12)
NEUTROPHILS # BLD AUTO: 8.71 THOUSANDS/ÂΜL (ref 1.85–7.62)
NEUTS SEG NFR BLD AUTO: 84 % (ref 43–75)
NITRITE UR QL STRIP: NEGATIVE
NRBC BLD AUTO-RTO: 0 /100 WBCS
P AXIS: 51 DEGREES
PH UR STRIP.AUTO: 5.5 [PH]
PLATELET # BLD AUTO: 252 THOUSANDS/UL (ref 149–390)
PMV BLD AUTO: 11.5 FL (ref 8.9–12.7)
POTASSIUM SERPL-SCNC: 4.1 MMOL/L (ref 3.5–5.3)
PR INTERVAL: 156 MS
PROT SERPL-MCNC: 7.3 G/DL (ref 6.4–8.4)
PROT UR STRIP-MCNC: NEGATIVE MG/DL
QRS AXIS: 118 DEGREES
QRSD INTERVAL: 140 MS
QT INTERVAL: 456 MS
QTC INTERVAL: 481 MS
RBC # BLD AUTO: 5.09 MILLION/UL (ref 3.81–5.12)
SODIUM SERPL-SCNC: 140 MMOL/L (ref 135–147)
SP GR UR STRIP.AUTO: 1.04 (ref 1–1.03)
T WAVE AXIS: -6 DEGREES
UROBILINOGEN UR STRIP-ACNC: <2 MG/DL
VENTRICULAR RATE: 67 BPM
WBC # BLD AUTO: 10.31 THOUSAND/UL (ref 4.31–10.16)

## 2025-03-30 PROCEDURE — 83605 ASSAY OF LACTIC ACID: CPT | Performed by: EMERGENCY MEDICINE

## 2025-03-30 PROCEDURE — 99285 EMERGENCY DEPT VISIT HI MDM: CPT

## 2025-03-30 PROCEDURE — 93005 ELECTROCARDIOGRAM TRACING: CPT

## 2025-03-30 PROCEDURE — 36415 COLL VENOUS BLD VENIPUNCTURE: CPT

## 2025-03-30 PROCEDURE — 80053 COMPREHEN METABOLIC PANEL: CPT

## 2025-03-30 PROCEDURE — 84484 ASSAY OF TROPONIN QUANT: CPT

## 2025-03-30 PROCEDURE — 81003 URINALYSIS AUTO W/O SCOPE: CPT

## 2025-03-30 PROCEDURE — 99285 EMERGENCY DEPT VISIT HI MDM: CPT | Performed by: EMERGENCY MEDICINE

## 2025-03-30 PROCEDURE — 96374 THER/PROPH/DIAG INJ IV PUSH: CPT

## 2025-03-30 PROCEDURE — 99222 1ST HOSP IP/OBS MODERATE 55: CPT | Performed by: HOSPITALIST

## 2025-03-30 PROCEDURE — 93010 ELECTROCARDIOGRAM REPORT: CPT | Performed by: INTERNAL MEDICINE

## 2025-03-30 PROCEDURE — 96361 HYDRATE IV INFUSION ADD-ON: CPT

## 2025-03-30 PROCEDURE — 71046 X-RAY EXAM CHEST 2 VIEWS: CPT

## 2025-03-30 PROCEDURE — 74177 CT ABD & PELVIS W/CONTRAST: CPT

## 2025-03-30 PROCEDURE — 96375 TX/PRO/DX INJ NEW DRUG ADDON: CPT

## 2025-03-30 PROCEDURE — 83690 ASSAY OF LIPASE: CPT

## 2025-03-30 PROCEDURE — 85025 COMPLETE CBC W/AUTO DIFF WBC: CPT

## 2025-03-30 RX ORDER — SUCRALFATE 1 G/1
1 TABLET ORAL
Status: DISCONTINUED | OUTPATIENT
Start: 2025-03-30 | End: 2025-03-31 | Stop reason: HOSPADM

## 2025-03-30 RX ORDER — ATENOLOL 25 MG/1
25 TABLET ORAL DAILY
Status: DISCONTINUED | OUTPATIENT
Start: 2025-03-30 | End: 2025-03-31 | Stop reason: HOSPADM

## 2025-03-30 RX ORDER — ACETAMINOPHEN 325 MG/1
650 TABLET ORAL EVERY 6 HOURS SCHEDULED
Status: DISCONTINUED | OUTPATIENT
Start: 2025-03-30 | End: 2025-03-31 | Stop reason: HOSPADM

## 2025-03-30 RX ORDER — PANTOPRAZOLE SODIUM 40 MG/10ML
40 INJECTION, POWDER, LYOPHILIZED, FOR SOLUTION INTRAVENOUS ONCE
Status: COMPLETED | OUTPATIENT
Start: 2025-03-30 | End: 2025-03-30

## 2025-03-30 RX ORDER — PANTOPRAZOLE SODIUM 40 MG/10ML
40 INJECTION, POWDER, LYOPHILIZED, FOR SOLUTION INTRAVENOUS EVERY 12 HOURS SCHEDULED
Status: DISCONTINUED | OUTPATIENT
Start: 2025-03-30 | End: 2025-03-31 | Stop reason: HOSPADM

## 2025-03-30 RX ORDER — ONDANSETRON 2 MG/ML
4 INJECTION INTRAMUSCULAR; INTRAVENOUS ONCE
Status: COMPLETED | OUTPATIENT
Start: 2025-03-30 | End: 2025-03-30

## 2025-03-30 RX ORDER — ENOXAPARIN SODIUM 100 MG/ML
40 INJECTION SUBCUTANEOUS DAILY
Status: DISCONTINUED | OUTPATIENT
Start: 2025-03-30 | End: 2025-03-31 | Stop reason: HOSPADM

## 2025-03-30 RX ORDER — SODIUM CHLORIDE, SODIUM GLUCONATE, SODIUM ACETATE, POTASSIUM CHLORIDE, MAGNESIUM CHLORIDE, SODIUM PHOSPHATE, DIBASIC, AND POTASSIUM PHOSPHATE .53; .5; .37; .037; .03; .012; .00082 G/100ML; G/100ML; G/100ML; G/100ML; G/100ML; G/100ML; G/100ML
100 INJECTION, SOLUTION INTRAVENOUS CONTINUOUS
Status: DISPENSED | OUTPATIENT
Start: 2025-03-30 | End: 2025-03-31

## 2025-03-30 RX ORDER — METOCLOPRAMIDE 10 MG/1
10 TABLET ORAL EVERY 6 HOURS
Qty: 30 TABLET | Refills: 0 | Status: SHIPPED | OUTPATIENT
Start: 2025-03-30 | End: 2025-03-31

## 2025-03-30 RX ORDER — SUCRALFATE 1 G/1
1 TABLET ORAL ONCE
Status: COMPLETED | OUTPATIENT
Start: 2025-03-30 | End: 2025-03-30

## 2025-03-30 RX ORDER — KETOROLAC TROMETHAMINE 30 MG/ML
15 INJECTION, SOLUTION INTRAMUSCULAR; INTRAVENOUS ONCE
Status: COMPLETED | OUTPATIENT
Start: 2025-03-30 | End: 2025-03-30

## 2025-03-30 RX ORDER — MAGNESIUM HYDROXIDE/ALUMINUM HYDROXICE/SIMETHICONE 120; 1200; 1200 MG/30ML; MG/30ML; MG/30ML
30 SUSPENSION ORAL EVERY 6 HOURS PRN
Status: DISCONTINUED | OUTPATIENT
Start: 2025-03-30 | End: 2025-03-31 | Stop reason: HOSPADM

## 2025-03-30 RX ORDER — CITALOPRAM HYDROBROMIDE 20 MG/1
10 TABLET ORAL DAILY
Status: DISCONTINUED | OUTPATIENT
Start: 2025-03-30 | End: 2025-03-31 | Stop reason: HOSPADM

## 2025-03-30 RX ADMIN — ONDANSETRON 4 MG: 2 INJECTION, SOLUTION INTRAMUSCULAR; INTRAVENOUS at 06:02

## 2025-03-30 RX ADMIN — IOHEXOL 100 ML: 350 INJECTION, SOLUTION INTRAVENOUS at 06:54

## 2025-03-30 RX ADMIN — PANTOPRAZOLE SODIUM 40 MG: 40 INJECTION, POWDER, LYOPHILIZED, FOR SOLUTION INTRAVENOUS at 06:02

## 2025-03-30 RX ADMIN — PANTOPRAZOLE SODIUM 40 MG: 40 INJECTION, POWDER, LYOPHILIZED, FOR SOLUTION INTRAVENOUS at 21:05

## 2025-03-30 RX ADMIN — ENOXAPARIN SODIUM 40 MG: 40 INJECTION SUBCUTANEOUS at 12:07

## 2025-03-30 RX ADMIN — SODIUM CHLORIDE, SODIUM ACETATE ANHYDROUS, SODIUM GLUCONATE, POTASSIUM CHLORIDE, AND MAGNESIUM CHLORIDE 100 ML/HR: 526; 222; 502; 37; 30 INJECTION, SOLUTION INTRAVENOUS at 12:11

## 2025-03-30 RX ADMIN — SUCRALFATE 1 G: 1 TABLET ORAL at 12:04

## 2025-03-30 RX ADMIN — SUCRALFATE 1 G: 1 TABLET ORAL at 21:04

## 2025-03-30 RX ADMIN — SUCRALFATE 1 G: 1 TABLET ORAL at 15:17

## 2025-03-30 RX ADMIN — SODIUM CHLORIDE 1000 ML: 0.9 INJECTION, SOLUTION INTRAVENOUS at 08:43

## 2025-03-30 RX ADMIN — CITALOPRAM HYDROBROMIDE 10 MG: 20 TABLET ORAL at 12:05

## 2025-03-30 RX ADMIN — KETOROLAC TROMETHAMINE 15 MG: 30 INJECTION, SOLUTION INTRAMUSCULAR; INTRAVENOUS at 06:05

## 2025-03-30 RX ADMIN — SODIUM CHLORIDE 1000 ML: 0.9 INJECTION, SOLUTION INTRAVENOUS at 05:58

## 2025-03-30 RX ADMIN — SUCRALFATE 1 G: 1 TABLET ORAL at 06:05

## 2025-03-30 NOTE — ED PROVIDER NOTES
Time reflects when diagnosis was documented in both MDM as applicable and the Disposition within this note       Time User Action Codes Description Comment    3/30/2025  7:46 AM Zeyad Skaggs [R10.13] Epigastric pain     3/30/2025  7:46 AM Zeyad Skaggs [R11.2] Nausea and vomiting     3/30/2025  7:46 AM Zeyad Skaggs [K52.9] Enteritis           ED Disposition       None          Assessment & Plan       Medical Decision Making  64-year-old female history of gastritis, left bundle branch block presenting due to epigastric pain.  Patient states she went to bed in her normal state of health last night but was woken up around midnight with epigastric pain that worsens throughout the morning until presenting to the emergency department.  Patient states that she did take Pepcid prior to arrival.  Patient is feeling nauseous but denies any vomiting, no diarrhea.  Upper abdominal pain does not radiate up to the chest, to her arm or to her back.  Denies fever, chills.  No chest pain or shortness of breath.    On physical exam, patient has tenderness in epigastric and right upper abdomen.  Clear lung sounds bilaterally.  No CVA tenderness or lower abdominal tenderness appreciated.  History and exam concerning for gastritis, pancreatitis, biliary disease, ACS, pneumonia, constipation, less likely UTI, kidney stone, diverticulitis, appendicitis, epiploic appendagitis, ischemic gut.  Will get CT imaging of the abdomen as well.    Amount and/or Complexity of Data Reviewed  Labs: ordered. Decision-making details documented in ED Course.  Radiology: ordered and independent interpretation performed. Decision-making details documented in ED Course.    Risk  Prescription drug management.        ED Course as of 03/30/25 0810   Sun Mar 30, 2025   0551 Blood Pressure: 127/69   0551 Temperature: 98.3 °F (36.8 °C)   0551 Pulse: 84   0551 Respirations: 20   0551 SpO2: 99 %   0654 LACTIC ACID(!): 2.8  Elevated   0728 CT  abdomen pelvis with contrast  Findings involving multiple mid to distal small bowel loops which may represent nonspecific enteritis, with associated mild bowel dilatation which may represent ileus, however early or partial obstruction is not entirely excluded. Small ascites.     Cholelithiasis.     0728 XR chest 2 views  Patient interpretation-no acute cardiopulmonary disease appreciated.   0808 Pt signed out to Dr Solomon pending repeat LA, if normal pt feels good going home with Reglan and GI follow up, if remains elevated. Plan for admission.       Medications   sucralfate (CARAFATE) tablet 1 g (1 g Oral Given 3/30/25 0605)   sodium chloride 0.9 % bolus 1,000 mL (0 mL Intravenous Stopped 3/30/25 0658)   ondansetron (ZOFRAN) injection 4 mg (4 mg Intravenous Given 3/30/25 0602)   ketorolac (TORADOL) injection 15 mg (15 mg Intravenous Given 3/30/25 0605)   pantoprazole (PROTONIX) injection 40 mg (40 mg Intravenous Given 3/30/25 0602)   iohexol (OMNIPAQUE) 350 MG/ML injection (MULTI-DOSE) 100 mL (100 mL Intravenous Given 3/30/25 0654)       ED Risk Strat Scores                            SBIRT 22yo+      Flowsheet Row Most Recent Value   Initial Alcohol Screen: US AUDIT-C     1. How often do you have a drink containing alcohol? 0 Filed at: 03/30/2025 0537   2. How many drinks containing alcohol do you have on a typical day you are drinking?  0 Filed at: 03/30/2025 0537   3a. Male UNDER 65: How often do you have five or more drinks on one occasion? 0 Filed at: 03/30/2025 0537   3b. FEMALE Any Age, or MALE 65+: How often do you have 4 or more drinks on one occassion? 0 Filed at: 03/30/2025 0537   Audit-C Score 0 Filed at: 03/30/2025 0537   MARCELLE: How many times in the past year have you...    Used an illegal drug or used a prescription medication for non-medical reasons? Never Filed at: 03/30/2025 0537                            History of Present Illness       Chief Complaint   Patient presents with    Abdominal Pain      Patient comes in reporting intermittent upper mid abd pain that woke her up at 0000. Denies V/D/constipation. +nausea. Rates pain 9/10.        Past Medical History:   Diagnosis Date    Abnormal ECG 04/2009    Anxiety     Arthritis     Fibroids     GERD (gastroesophageal reflux disease)     Heart disease     Heart murmur 1988    Heart valve disease     HL (hearing loss)     Deaf right ear    Mitral valve prolapse 1988    Shingles     Skin tag     Varicella       Past Surgical History:   Procedure Laterality Date    BREAST CYST ASPIRATION      CARDIAC CATHETERIZATION      DILATION AND EVACUATION      TONSILECTOMY AND ADNOIDECTOMY      TUBAL LIGATION        Family History   Problem Relation Age of Onset    Breast cancer Mother 80    Cancer Mother     Hearing loss Mother     Diabetes Mother     Colon cancer Father     Coronary artery disease Father     COPD Father     Cancer Father     BRCA1 Negative Sister     BRCA2 Negative Sister     Diabetes Sister     Squamous cell carcinoma Sister     No Known Problems Sister     No Known Problems Sister     No Known Problems Son     Breast cancer Maternal Grandmother 30    Breast cancer Maternal Grandfather     Breast cancer Paternal Grandmother     Breast cancer Paternal Grandfather     Stomach cancer Maternal Aunt     Cancer Maternal Aunt         Ovarian    Breast cancer Family     Colon cancer Family     Ovarian cancer Neg Hx     Uterine cancer Neg Hx     Cervical cancer Neg Hx       Social History     Tobacco Use    Smoking status: Former     Current packs/day: 0.25     Average packs/day: 0.3 packs/day for 10.0 years (2.5 ttl pk-yrs)     Types: Cigarettes    Smokeless tobacco: Never   Vaping Use    Vaping status: Never Used   Substance Use Topics    Alcohol use: Yes     Alcohol/week: 4.0 standard drinks of alcohol     Types: 2 Glasses of wine, 2 Cans of beer per week     Comment: socially     Drug use: Never      E-Cigarette/Vaping    E-Cigarette Use Never User        E-Cigarette/Vaping Substances    Nicotine No     THC No     CBD No     Flavoring No     Other No     Unknown No       I have reviewed and agree with the history as documented.     64-year-old female history of gastritis, left bundle branch block presenting due to epigastric pain.  Patient states she went to bed in her normal state of health last night but was woken up around midnight with epigastric pain that worsens throughout the morning until presenting to the emergency department.  Patient states that she did take Pepcid prior to arrival.  Patient is feeling nauseous but denies any vomiting, no diarrhea.  Upper abdominal pain does not radiate up to the chest, to her arm or to her back.  Denies fever, chills.  No chest pain or shortness of breath.          Review of Systems   Constitutional:  Negative for chills and fever.   HENT:  Negative for ear pain and sore throat.    Eyes:  Negative for pain and visual disturbance.   Respiratory:  Negative for cough and shortness of breath.    Cardiovascular:  Negative for chest pain and palpitations.   Gastrointestinal:  Positive for abdominal pain and nausea. Negative for anal bleeding, blood in stool, constipation, diarrhea and vomiting.   Genitourinary:  Negative for dysuria, flank pain, hematuria and pelvic pain.   Musculoskeletal:  Negative for arthralgias and back pain.   Skin:  Negative for color change and rash.   Neurological:  Negative for dizziness, seizures, syncope, light-headedness and headaches.   All other systems reviewed and are negative.          Objective       ED Triage Vitals   Temperature Pulse Blood Pressure Respirations SpO2 Patient Position - Orthostatic VS   03/30/25 0549 03/30/25 0538 03/30/25 0538 03/30/25 0538 03/30/25 0538 03/30/25 0538   98.3 °F (36.8 °C) 84 127/69 20 99 % Sitting      Temp Source Heart Rate Source BP Location FiO2 (%) Pain Score    03/30/25 0549 03/30/25 0700 03/30/25 0538 -- 03/30/25 0538    Oral Monitor Left arm  9       Vitals      Date and Time Temp Pulse SpO2 Resp BP Pain Score FACES Pain Rating User   03/30/25 0730 -- 62 98 % 18 116/62 -- --    03/30/25 0709 -- -- -- -- -- 2 --    03/30/25 0700 -- 66 97 % 18 142/64 -- --    03/30/25 0630 -- 61 97 % -- 117/62 -- --    03/30/25 0549 98.3 °F (36.8 °C) -- -- -- -- -- --    03/30/25 0538 -- 84 99 % 20 127/69 9 --             Physical Exam  Vitals and nursing note reviewed.   Constitutional:       General: She is not in acute distress.     Appearance: She is well-developed.   HENT:      Head: Normocephalic and atraumatic.      Nose: Nose normal. No congestion.   Eyes:      Extraocular Movements: Extraocular movements intact.      Conjunctiva/sclera: Conjunctivae normal.   Cardiovascular:      Rate and Rhythm: Normal rate and regular rhythm.      Pulses: Normal pulses.      Heart sounds: Normal heart sounds. No murmur heard.  Pulmonary:      Effort: Pulmonary effort is normal. No respiratory distress.      Breath sounds: Normal breath sounds.   Chest:      Chest wall: No tenderness.   Abdominal:      General: Abdomen is flat. Bowel sounds are normal.      Palpations: Abdomen is soft.      Tenderness: There is abdominal tenderness. There is no right CVA tenderness or left CVA tenderness.      Comments: Epigastric tenderness   Musculoskeletal:         General: No deformity or signs of injury. Normal range of motion.      Cervical back: Normal range of motion and neck supple. No rigidity or tenderness.   Skin:     General: Skin is warm and dry.      Findings: No bruising, lesion or rash.   Neurological:      General: No focal deficit present.      Mental Status: She is alert and oriented to person, place, and time.         Results Reviewed       Procedure Component Value Units Date/Time    HS Troponin I 2hr [[id number]] Collected: 03/30/25 0758    Lab Status: In process Specimen: Blood from Arm, Right Updated: 03/30/25 0802    Lactic acid 2 Hours [[id number]] Collected:  03/30/25 0758    Lab Status: In process Specimen: Blood from Arm, Right Updated: 03/30/25 0802    HS Troponin I 4hr [[id number]]     Lab Status: No result Specimen: Blood     UA w Reflex to Microscopic w Reflex to Culture [[id number]]  (Abnormal) Collected: 03/30/25 0713    Lab Status: Final result Specimen: Urine, Clean Catch Updated: 03/30/25 0725     Color, UA Colorless     Clarity, UA Clear     Specific Gravity, UA 1.043     pH, UA 5.5     Leukocytes, UA Negative     Nitrite, UA Negative     Protein, UA Negative mg/dl      Glucose, UA Negative mg/dl      Ketones, UA Negative mg/dl      Urobilinogen, UA <2.0 mg/dl      Bilirubin, UA Negative     Occult Blood, UA Negative    Comprehensive metabolic panel [[id number]]  (Abnormal) Collected: 03/30/25 0557    Lab Status: Final result Specimen: Blood from Arm, Right Updated: 03/30/25 0645     Sodium 140 mmol/L      Potassium 4.1 mmol/L      Chloride 103 mmol/L      CO2 25 mmol/L      ANION GAP 12 mmol/L      BUN 12 mg/dL      Creatinine 0.67 mg/dL      Glucose 142 mg/dL      Calcium 9.6 mg/dL      AST 23 U/L      ALT 28 U/L      Alkaline Phosphatase 65 U/L      Total Protein 7.3 g/dL      Albumin 4.6 g/dL      Total Bilirubin 0.76 mg/dL      eGFR 93 ml/min/1.73sq m     Narrative:      [employer] guidelines for Chronic Kidney Disease (CKD):     Stage 1 with normal or high GFR (GFR > 90 mL/min/1.73 square meters)    Stage 2 Mild CKD (GFR = 60-89 mL/min/1.73 square meters)    Stage 3A Moderate CKD (GFR = 45-59 mL/min/1.73 square meters)    Stage 3B Moderate CKD (GFR = 30-44 mL/min/1.73 square meters)    Stage 4 Severe CKD (GFR = 15-29 mL/min/1.73 square meters)    Stage 5 End Stage CKD (GFR <15 mL/min/1.73 square meters)  Note: GFR calculation is accurate only with a steady state creatinine    Lipase [[id number]]  (Normal) Collected: 03/30/25 0557    Lab Status: Final result Specimen: Blood from Arm, Right Updated: 03/30/25 0645     Lipase 11 u/L      HS Troponin 0hr (reflex protocol) [[id number]]  (Normal) Collected: 03/30/25 0557    Lab Status: Final result Specimen: Blood from Arm, Right Updated: 03/30/25 0644     hs TnI 0hr 3 ng/L     Lactic acid, plasma (w/reflex if result > 2.0) [[id number]]  (Abnormal) Collected: 03/30/25 0603    Lab Status: Final result Specimen: Blood from Arm, Right Updated: 03/30/25 0638     LACTIC ACID 2.8 mmol/L     Narrative:      Result may be elevated if tourniquet was used during collection.    CBC and differential [[id number]]  (Abnormal) Collected: 03/30/25 0557    Lab Status: Final result Specimen: Blood from Arm, Right Updated: 03/30/25 0615     WBC 10.31 Thousand/uL      RBC 5.09 Million/uL      Hemoglobin 14.8 g/dL      Hematocrit 43.6 %      MCV 86 fL      MCH 29.1 pg      MCHC 33.9 g/dL      RDW 13.3 %      MPV 11.5 fL      Platelets 252 Thousands/uL      nRBC 0 /100 WBCs      Segmented % 84 %      Immature Grans % 1 %      Lymphocytes % 10 %      Monocytes % 3 %      Eosinophils Relative 2 %      Basophils Relative 0 %      Absolute Neutrophils 8.71 Thousands/µL      Absolute Immature Grans 0.05 Thousand/uL      Absolute Lymphocytes 1.03 Thousands/µL      Absolute Monocytes 0.31 Thousand/µL      Eosinophils Absolute 0.17 Thousand/µL      Basophils Absolute 0.04 Thousands/µL             CT abdomen pelvis with contrast   Final Interpretation by Wilbur Leach MD (03/30 0723)      Findings involving multiple mid to distal small bowel loops which may represent nonspecific enteritis, with associated mild bowel dilatation which may represent ileus, however early or partial obstruction is not entirely excluded. Small ascites.      Cholelithiasis.      The study was marked in EPIC for immediate notification.         Workstation performed: [id number]         XR chest 2 views   ED Interpretation by Zeyad Skaggs MD (03/30 0728)   Patient interpretation-no acute cardiopulmonary disease appreciated.          ECG 12 Lead  Documentation Only    Date/Time: 3/30/2025 6:12 AM    Performed by: Zeyad Skaggs MD  Authorized by: Zeyad Skaggs MD    Patient location:  ED  Interpretation:     Interpretation: abnormal    Rate:     ECG rate:  67    ECG rate assessment: normal    Rhythm:     Rhythm: sinus rhythm    Ectopy:     Ectopy: none    QRS:     QRS axis:  Right    QRS intervals:  Wide  Conduction:     Conduction: abnormal      Abnormal conduction: non-specific intraventricular conduction delay    ST segments:     ST segments:  Normal  T waves:     T waves: normal        ED Medication and Procedure Management   Prior to Admission Medications   Prescriptions Last Dose Informant Patient Reported? Taking?   ALPRAZolam (XANAX) 0.25 mg tablet  Self No No   Sig: Take 1 tablet (0.25 mg total) by mouth if needed for anxiety (Take 30 minutes to 1 hour prior to flight)   B Complex Vitamins (B COMPLEX 1 PO)  Self Yes No   Sig: Take by mouth   Omega-3 Fatty Acids (Omega-3 Fish Oil) 500 MG CAPS  Self Yes No   Sig: Take 600 mg by mouth in the morning   atenolol (TENORMIN) 25 mg tablet   No No   Sig: TAKE 1 TABLET (25 MG TOTAL) BY MOUTH DAILY.   cetirizine (ZyrTEC) 10 mg tablet  Self Yes No   Sig: Take 10 mg by mouth daily   citalopram (CeleXA) 10 mg tablet   No No   Sig: TAKE 1 TABLET BY MOUTH EVERY DAY   omeprazole (PriLOSEC) 40 MG capsule   Yes No      Facility-Administered Medications: None     Patient's Medications   Discharge Prescriptions    METOCLOPRAMIDE (REGLAN) 10 MG TABLET    Take 1 tablet (10 mg total) by mouth every 6 (six) hours       Start Date: 3/30/2025 End Date: --       Order Dose: 10 mg       Quantity: 30 tablet    Refills: 0       ED SEPSIS DOCUMENTATION   Time reflects when diagnosis was documented in both MDM as applicable and the Disposition within this note       Time User Action Codes Description Comment    3/30/2025  7:46 AM Zeyad Skaggs [R10.13] Epigastric pain     3/30/2025  7:46 AM Zeyad Skaggs [R11.2] Nausea  and vomiting     3/30/2025  7:46 AM Zeyad Skaggs Add [K52.9] Enteritis                  Zeyad Skaggs MD  03/30/25 0810

## 2025-03-30 NOTE — PLAN OF CARE
Problem: PAIN - ADULT  Goal: Verbalizes/displays adequate comfort level or baseline comfort level  Description: Interventions:- Encourage patient to monitor pain and request assistance- Assess pain using appropriate pain scale- Administer analgesics based on type and severity of pain and evaluate response- Implement non-pharmacological measures as appropriate and evaluate response- Consider cultural and social influences on pain and pain management- Notify physician/advanced practitioner if interventions unsuccessful or patient reports new pain  Outcome: Progressing     Problem: INFECTION - ADULT  Goal: Absence or prevention of progression during hospitalization  Description: INTERVENTIONS:- Assess and monitor for signs and symptoms of infection- Monitor lab/diagnostic results- Monitor all insertion sites, i.e. indwelling lines, tubes, and drains- Monitor endotracheal if appropriate and nasal secretions for changes in amount and color- [place] appropriate cooling/warming therapies per order- Administer medications as ordered- Instruct and encourage patient and family to use good hand hygiene technique- Identify and instruct in appropriate isolation precautions for identified infection/condition  Outcome: Progressing  Goal: Absence of fever/infection during neutropenic period  Description: INTERVENTIONS:- Monitor WBC  Outcome: Progressing     Problem: SAFETY ADULT  Goal: Patient will remain free of falls  Description: INTERVENTIONS:- Educate patient/family on patient safety including physical limitations- Instruct patient to call for assistance with activity - Consult OT/PT to assist with strengthening/mobility - Keep Call bell within reach- Keep bed low and locked with side rails adjusted as appropriate- Keep care items and personal belongings within reach- Initiate and maintain comfort rounds- Make Fall Risk Sign visible to staff- Offer Toileting every 2 Hours, in advance of need- Initiate/Maintain 2alarm-  Obtain necessary fall risk management equipment: 2- Apply yellow socks and bracelet for high fall risk patients- Consider moving patient to room near nurses station  Outcome: Progressing  Goal: Maintain or return to baseline ADL function  Description: INTERVENTIONS:-  Assess patient's ability to carry out ADLs; assess patient's baseline for ADL function and identify physical deficits which impact ability to perform ADLs (bathing, care of mouth/teeth, toileting, grooming, dressing, etc.)- Assess/evaluate cause of self-care deficits - Assess range of motion- Assess patient's mobility; develop plan if impaired- Assess patient's need for assistive devices and provide as appropriate- Encourage maximum independence but intervene and supervise when necessary- Involve family in performance of ADLs- Assess for home care needs following discharge - Consider OT consult to assist with ADL evaluation and planning for discharge- Provide patient education as appropriate  Outcome: Progressing  Goal: Maintains/Returns to pre admission functional level  Description: INTERVENTIONS:- Perform AM-PAC 6 Click Basic Mobility/ Daily Activity assessment daily.- Set and communicate daily mobility goal to care team and patient/family/caregiver. - Collaborate with rehabilitation services on mobility goals if consulted- Perform Range of Motion 2 times a day.- Reposition patient every 2 hours.- Dangle patient 2 times a day- Stand patient 2 times a day- Ambulate patient 2 times a day- Out of bed to chair 2 times a day - Out of bed for meals 2 times a day- Out of bed for toileting- Record patient progress and toleration of activity level   Outcome: Progressing     Problem: DISCHARGE PLANNING  Goal: Discharge to home or other facility with appropriate resources  Description: INTERVENTIONS:- Identify barriers to discharge w/patient and caregiver- Arrange for needed discharge resources and transportation as appropriate- Identify discharge learning  needs (meds, wound care, etc.)- Arrange for interpretive services to assist at discharge as needed- Refer to Case Management Department for coordinating discharge planning if the patient needs post-hospital services based on physician/advanced practitioner order or complex needs related to functional status, cognitive ability, or social support system  Outcome: Progressing     Problem: Knowledge Deficit  Goal: Patient/family/caregiver demonstrates understanding of disease process, treatment plan, medications, and discharge instructions  Description: Complete learning assessment and assess knowledge base.Interventions:- Provide teaching at level of understanding- Provide teaching via preferred learning methods  Outcome: Progressing

## 2025-03-30 NOTE — ED CARE HANDOFF
Emergency Department Sign Out Note        Sign out and transfer of care from Dr. Skaggs. See Separate Emergency Department note.     The patient, Shell Dey, was evaluated by the previous provider for abdominal pain.    Workup Completed:  CBC, CMP, lipase, UA, lactic acid, troponin, EKG, CT abdomen pelvis, chest x-ray    ED Course / Workup Pending (followup):  CT abdomen pelvis concerning for enteritis versus ileus  Elevated lactic acidosi  Disposition pending lactate repeat lactate/IV fluids: If lactate remains elevated, patient to be admitted, if normal or cleared patient can be discharged home with GI follow-up.            ED Course as of 03/30/25 0858   Sun Mar 30, 2025   0823 LACTIC ACID(!): 2.3   0837 Patient updated on plan thus far.  Reports improvement in abdominal pain repeat belly exam benign.  However given repeat elevated lactic acidosis patient would benefit from continued IV fluids.  Troponin negative.  Patient agreeable with admission plan.  Reached out to [facility] for admission.     Procedures  Medical Decision Making  64-year-old female presented with severe abdominal pain along with nausea.  Labs significant for elevated lactic acidosis, with CT imaging showing nonspecific enteritis versus ileus.  Patient did have bowel movement while in the ED so enteritis favored.  Patient would really like to be discharged home, however discussion with prior team was that if she remains with elevated lactic acid she should be admitted for further management.  If lactate cleared and patient still good pain control and benign serial abdominal exams she could be discharged home with close outpatient follow-up.    Amount and/or Complexity of Data Reviewed  Labs: ordered. Decision-making details documented in ED Course.  Radiology: ordered and independent interpretation performed.    Risk  Prescription drug management.  Decision regarding hospitalization.            Disposition  Final diagnoses:   Epigastric pain    Nausea and vomiting   Enteritis   Elevated lactic acid level     Time reflects when diagnosis was documented in both MDM as applicable and the Disposition within this note       Time User Action Codes Description Comment    3/30/2025  7:46 AM Zeyad Skaggs [R10.13] Epigastric pain     3/30/2025  7:46 AM Zeyad Skaggs [R11.2] Nausea and vomiting     3/30/2025  7:46 AM Zeyad Skaggs [K52.9] Enteritis     3/30/2025  8:38 AM Vernon Soloomn [R79.89] Elevated lactic acid level           ED Disposition       ED Disposition   Admit    Condition   Stable    Date/Time   Sun Mar 30, 2025  8:57 AM    Comment   Case was discussed with anthony and the patient's admission status was agreed to be Admission Status: observation status to the service of Dr. Steinberg .               Follow-up Information       Follow up With Specialties Details Why Contact Info Additional Information    [facility]   [address]  [address]  [facility] [address]  [phone] [facility] [address], [place], [place], [address]   [phone]    Naty Holbrook DO Family Medicine   [address] [place] [id number] [phone]        [facility] Emergency Department Emergency Medicine   [address] [address]  [phone] [facility] Emergency Department, [facility] [place], [place], [address]          Patient's Medications   Discharge Prescriptions    METOCLOPRAMIDE (REGLAN) 10 MG TABLET    Take 1 tablet (10 mg total) by mouth every 6 (six) hours       Start Date: 3/30/2025 End Date: --       Order Dose: 10 mg       Quantity: 30 tablet    Refills: 0            ED Provider  Electronically Signed by     Vernon Solomon DO  03/30/25 0858

## 2025-03-30 NOTE — ASSESSMENT & PLAN NOTE
Negative CMP less likely hepatic or biliary etiology.  Negative lipase, less likely to be pancreatic etiology.  Troponin was negative, EKG negative for ST changes, less likely ACS.  History and characterization makes vascular causes less likely.  CT imaging showing enteritis.  However patient is having normal bowel movements as her previous schedules, no signs of diarrhea, symptoms of fever, unsure if patient has true enteritis.    Overall presentation, differential includes enteritis, possibly viral etiology, food poisoning, food reaction, esophageal spasm.    Plan:  Managing supportively, IV fluid overnight  Protonix 40 mg twice daily  Sucralfate  Scheduled Tylenol for now  Tigan for nausea vomiting  Pending repeat morning CMP and CBC as there could be a delay in objective findings from clinical presentation  Full liquid diet

## 2025-03-30 NOTE — ED ATTENDING ATTESTATION
3/30/2025  I, Norris Holley MD, saw and evaluated the patient. I have discussed the patient with the resident/non-physician practitioner and agree with the resident's/non-physician practitioner's findings, Plan of Care, and MDM as documented in the resident's/non-physician practitioner's note, except where noted. All available labs and Radiology studies were reviewed.  I was present for key portions of any procedure(s) performed by the resident/non-physician practitioner and I was immediately available to provide assistance.       At this point I agree with the current assessment done in the Emergency Department.  I have conducted an independent evaluation of this patient a history and physical is as follows: Patient is a 64 year old female with upper abdominal pain with nausea since midnight tonight. Tried pepcid without relief. (+) reflux sx. No vomiting. Last BM today. (+) flatus. No diarrhea. Has had prior TL. No fever. No GI bleeding. No urinary sx. Had alcohol last night. Was last seen at  Dermatology in [place] on 3/5/25 for xerosis of skin. [employer] website checked on this patient and last Rx filled was on 5/21/24 for xanax for 3 day supply. NCAT. No scleral icterus. Mucous membranes somewhat moist. Lungs clear. Heart regular without murmur. Abdomen soft with diffuse upper abdominal tenderness. No guarding or rebound. (+) bowel sounds. No edema. No rash noted. No jaundice. DDx including but not limited to: appendicitis, gastroenteritis, gastritis, PUD, GERD, esophagitis, gastroparesis, hepatitis, pancreatitis, colitis, enteritis, diverticulitis, food poisoning, mesenteric adenitis, epiploic appendagitis, mesenteric panniculitis, mesenteric ischemia, IBD, IBS, ileus, bowel obstruction, volvulus, internal hernia, doubt AAA; cholecystitis, biliary colic, choledocholithiasis, perforated viscus, tumor, splenic etiology, constipation, pelvic pathology, renal colic, pyelonephritis, UTI; doubt cardiac  etiology or mediastinitis. I reviewed EKG. Will check labs, CXR, CT abd/pelvis and give IVFs and IV protonix and carafate and IV zofran and IV toradol.     ED Course         Critical Care Time  Procedures

## 2025-03-30 NOTE — ASSESSMENT & PLAN NOTE
PTA atenolol was prescribed for indication of this problem.  Continue with heart rate and blood pressure parameter.

## 2025-03-30 NOTE — PLAN OF CARE
Problem: PAIN - ADULT  Goal: Verbalizes/displays adequate comfort level or baseline comfort level  Description: Interventions:- Encourage patient to monitor pain and request assistance- Assess pain using appropriate pain scale- Administer analgesics based on type and severity of pain and evaluate response- Implement non-pharmacological measures as appropriate and evaluate response- Consider cultural and social influences on pain and pain management- Notify physician/advanced practitioner if interventions unsuccessful or patient reports new pain  Outcome: Progressing     Problem: INFECTION - ADULT  Goal: Absence or prevention of progression during hospitalization  Description: INTERVENTIONS:- Assess and monitor for signs and symptoms of infection- Monitor lab/diagnostic results- Monitor all insertion sites, i.e. indwelling lines, tubes, and drains- Monitor endotracheal if appropriate and nasal secretions for changes in amount and color- [place] appropriate cooling/warming therapies per order- Administer medications as ordered- Instruct and encourage patient and family to use good hand hygiene technique- Identify and instruct in appropriate isolation precautions for identified infection/condition  Outcome: Progressing  Goal: Absence of fever/infection during neutropenic period  Description: INTERVENTIONS:- Monitor WBC  Outcome: Progressing     Problem: SAFETY ADULT  Goal: Patient will remain free of falls  Description: INTERVENTIONS:- Educate patient/family on patient safety including physical limitations- Instruct patient to call for assistance with activity - Consult OT/PT to assist with strengthening/mobility - Keep Call bell within reach- Keep bed low and locked with side rails adjusted as appropriate- Keep care items and personal belongings within reach- Initiate and maintain comfort rounds- Outcome: Progressing  Goal: Maintain or return to baseline ADL function  Description: INTERVENTIONS:-  Assess patient's  ability to carry out ADLs; assess patient's baseline for ADL function and identify physical deficits which impact ability to perform ADLs (bathing, care of mouth/teeth, toileting, grooming, dressing, etc.)- Assess/evaluate cause of self-care deficits - Assess range of motion- Assess patient's mobility; develop plan if impaired- Assess patient's need for assistive devices and provide as appropriate- Encourage maximum independence but intervene and supervise when necessary- Involve family in performance of ADLs- Assess for home care needs following discharge - Consider OT consult to assist with ADL evaluation and planning for discharge- Provide patient education as appropriate  Outcome: Progressing  Goal: Maintains/Returns to pre admission functional level  Description: INTERVENTIONS:- Perform AM-PAC 6 Click Basic Mobility/ Daily Activity assessment daily.- Set and communicate daily mobility goal to care team and patient/family/caregiver. - Collaborate with rehabilitation services on mobility goals if consulted-    Problem: DISCHARGE PLANNING  Goal: Discharge to home or other facility with appropriate resources  Description: INTERVENTIONS:- Identify barriers to discharge w/patient and caregiver- Arrange for needed discharge resources and transportation as appropriate- Identify discharge learning needs (meds, wound care, etc.)- Arrange for interpretive services to assist at discharge as needed- Refer to Case Management Department for coordinating discharge planning if the patient needs post-hospital services based on physician/advanced practitioner order or complex needs related to functional status, cognitive ability, or social support system  Outcome: Progressing     Problem: Knowledge Deficit  Goal: Patient/family/caregiver demonstrates understanding of disease process, treatment plan, medications, and discharge instructions  Description: Complete learning assessment and assess knowledge base.Interventions:- Provide  teaching at level of understanding- Provide teaching via preferred learning methods  Outcome: Progressing

## 2025-03-30 NOTE — H&P
H&P - Hospitalist   Name: Shell Dey 64 y.o. female I MRN: [id number]  Unit/Bed#: -01 I Date of Admission: 3/30/2025   Date of Service: 3/30/2025 I Hospital Day: 0     Assessment & Plan  Abdominal pain  Negative CMP less likely hepatic or biliary etiology.  Negative lipase, less likely to be pancreatic etiology.  Troponin was negative, EKG negative for ST changes, less likely ACS.  History and characterization makes vascular causes less likely.  CT imaging showing enteritis.  However patient is having normal bowel movements as her previous schedules, no signs of diarrhea, symptoms of fever, unsure if patient has true enteritis.    Overall presentation, differential includes enteritis, possibly viral etiology, food poisoning, food reaction, esophageal spasm.    Plan:  Managing supportively, IV fluid overnight  Protonix 40 mg twice daily  Sucralfate  Scheduled Tylenol for now  Tigan for nausea vomiting  Pending repeat morning CMP and CBC as there could be a delay in objective findings from clinical presentation  Full liquid diet    Anxiety  Continue home anxiolytic  PVC (premature ventricular contraction)  PTA atenolol was prescribed for indication of this problem.  Continue with heart rate and blood pressure parameter.      VTE Pharmacologic Prophylaxis: VTE Score: 3 Moderate Risk (Score 3-4) - Pharmacological DVT Prophylaxis Ordered: enoxaparin (Lovenox).  Code Status: Level 1 - Full Code    Discussion with family: Updated  () at bedside.    Anticipated Length of Stay: Patient will be admitted on an observation basis with an anticipated length of stay of less than 2 midnights secondary to  .    History of Present Illness   Chief Complaint: Epigastric pain    Shell Dey is a 64 y.o. female with a PMH of gastritis, left bundle branch block (nonischemic causative), GERD, anxiety who presents with abdominal pain.    Acute onset, started 12 AM this morning, localizes in the mid epigastric  region, spastic, achy in characteristic, fluctuate intermittently, max of 8 out of 10 pain.  Identified no alleviating factor.  Denies any associated factors of shortness of breath, lightheadedness, diarrhea, fever, chills, nausea, vomiting, dysuria.    Prior to this admission, patient and family ate out, had a cocktail of wine.  Patient reported new foods for her    History of GERD, had endoscopy in 2024, however found to have normal fundus and cardia, negative for H. pylori.    Follows GYN no longer for annual exam.      Review of Systems    Historical Information   Past Medical History:   Diagnosis Date    Abnormal ECG 04/2009    Anxiety     Arthritis     Fibroids     GERD (gastroesophageal reflux disease)     Heart disease     Heart murmur 1988    Heart valve disease     HL (hearing loss)     Deaf right ear    Mitral valve prolapse 1988    Shingles     Skin tag     Varicella      Past Surgical History:   Procedure Laterality Date    BREAST CYST ASPIRATION      CARDIAC CATHETERIZATION      DILATION AND EVACUATION      TONSILECTOMY AND ADNOIDECTOMY      TUBAL LIGATION       Social History     Tobacco Use    Smoking status: Former     Current packs/day: 0.25     Average packs/day: 0.3 packs/day for 10.0 years (2.5 ttl pk-yrs)     Types: Cigarettes    Smokeless tobacco: Never   Vaping Use    Vaping status: Never Used   Substance and Sexual Activity    Alcohol use: Yes     Alcohol/week: 4.0 standard drinks of alcohol     Types: 2 Glasses of wine, 2 Cans of beer per week     Comment: socially     Drug use: Never    Sexual activity: Yes     Partners: Male     Birth control/protection: Post-menopausal     E-Cigarette/Vaping    E-Cigarette Use Never User      E-Cigarette/Vaping Substances    Nicotine No     THC No     CBD No     Flavoring No     Other No     Unknown No          Meds/Allergies   I have reviewed home medications with patient personally.  Prior to Admission medications    Medication Sig Start Date End Date  Taking? Authorizing Provider   atenolol (TENORMIN) 25 mg tablet TAKE 1 TABLET (25 MG TOTAL) BY MOUTH DAILY. 2/27/25  Yes Naty Holbrook DO   B Complex Vitamins (B COMPLEX 1 PO) Take by mouth   Yes Historical Provider, MD   cetirizine (ZyrTEC) 10 mg tablet Take 10 mg by mouth daily   Yes Historical Provider, MD   citalopram (CeleXA) 10 mg tablet TAKE 1 TABLET BY MOUTH EVERY DAY 2/27/25  Yes Naty Holbrook DO   metoclopramide (Reglan) 10 mg tablet Take 1 tablet (10 mg total) by mouth every 6 (six) hours 3/30/25  Yes Zeyad Skaggs MD   Omega-3 Fatty Acids (Omega-3 Fish Oil) 500 MG CAPS Take 600 mg by mouth in the morning   Yes Historical Provider, MD   omeprazole (PriLOSEC) 40 MG capsule  2/25/25  Yes Historical Provider, MD   ALPRAZolam (XANAX) 0.25 mg tablet Take 1 tablet (0.25 mg total) by mouth if needed for anxiety (Take 30 minutes to 1 hour prior to flight) 5/21/24   Naty Holbrook DO     No Known Allergies    Objective :  Temp:  [97.6 °F (36.4 °C)-98.3 °F (36.8 °C)] 97.6 °F (36.4 °C)  HR:  [50-84] 51  BP: (116-156)/(59-69) 156/68  Resp:  [18-20] 18  SpO2:  [97 %-100 %] 99 %  O2 Device: None (Room air)    Physical Exam  Vitals and nursing note reviewed.   Constitutional:       General: She is not in acute distress.     Appearance: She is well-developed.   HENT:      Head: Normocephalic and atraumatic.   Eyes:      Conjunctiva/sclera: Conjunctivae normal.   Cardiovascular:      Rate and Rhythm: Normal rate and regular rhythm.      Heart sounds: No murmur heard.  Pulmonary:      Effort: Pulmonary effort is normal. No respiratory distress.      Breath sounds: Normal breath sounds.   Abdominal:      Palpations: Abdomen is soft.      Tenderness: There is abdominal tenderness (Epigastric and left upper quadrant tenderness).      Comments: Negative for Martini sign, suprapubic   Musculoskeletal:         General: No swelling.      Cervical back: Neck supple.   Skin:     General: Skin is warm and dry.      Capillary Refill:  "Capillary refill takes less than 2 seconds.   Neurological:      Mental Status: She is alert.   Psychiatric:         Mood and Affect: Mood normal.           Lines/Drains:            Lab Results: I have reviewed the following results:  Results from last 7 days   Lab Units 03/30/25  0557   WBC Thousand/uL 10.31*   HEMOGLOBIN g/dL 14.8   HEMATOCRIT % 43.6   PLATELETS Thousands/uL 252   SEGS PCT % 84*   LYMPHO PCT % 10*   MONO PCT % 3*   EOS PCT % 2     Results from last 7 days   Lab Units 03/30/25  0557   SODIUM mmol/L 140   POTASSIUM mmol/L 4.1   CHLORIDE mmol/L 103   CO2 mmol/L 25   BUN mg/dL 12   CREATININE mg/dL 0.67   ANION GAP mmol/L 12   CALCIUM mg/dL 9.6   ALBUMIN g/dL 4.6   TOTAL BILIRUBIN mg/dL 0.76   ALK PHOS U/L 65   ALT U/L 28   AST U/L 23   GLUCOSE RANDOM mg/dL 142*             No results found for: \"HGBA1C\"  Results from last 7 days   Lab Units 03/30/25  1008 03/30/25  0758 03/30/25  0603   LACTIC ACID mmol/L 1.1 2.3* 2.8*             Administrative Statements       ** Please Note: This note has been constructed using a voice recognition system. **    "

## 2025-03-31 VITALS
HEART RATE: 53 BPM | TEMPERATURE: 98 F | OXYGEN SATURATION: 97 % | SYSTOLIC BLOOD PRESSURE: 128 MMHG | DIASTOLIC BLOOD PRESSURE: 62 MMHG | RESPIRATION RATE: 18 BRPM

## 2025-03-31 LAB
ALBUMIN SERPL BCG-MCNC: 3.5 G/DL (ref 3.5–5)
ALP SERPL-CCNC: 44 U/L (ref 34–104)
ALT SERPL W P-5'-P-CCNC: 20 U/L (ref 7–52)
ANION GAP SERPL CALCULATED.3IONS-SCNC: 4 MMOL/L (ref 4–13)
AST SERPL W P-5'-P-CCNC: 17 U/L (ref 13–39)
BILIRUB SERPL-MCNC: 0.77 MG/DL (ref 0.2–1)
BUN SERPL-MCNC: 5 MG/DL (ref 5–25)
CALCIUM SERPL-MCNC: 8.3 MG/DL (ref 8.4–10.2)
CHLORIDE SERPL-SCNC: 110 MMOL/L (ref 96–108)
CO2 SERPL-SCNC: 29 MMOL/L (ref 21–32)
CREAT SERPL-MCNC: 0.65 MG/DL (ref 0.6–1.3)
ERYTHROCYTE [DISTWIDTH] IN BLOOD BY AUTOMATED COUNT: 13.8 % (ref 11.6–15.1)
GFR SERPL CREATININE-BSD FRML MDRD: 94 ML/MIN/1.73SQ M
GLUCOSE SERPL-MCNC: 94 MG/DL (ref 65–140)
HCT VFR BLD AUTO: 35 % (ref 34.8–46.1)
HGB BLD-MCNC: 11.5 G/DL (ref 11.5–15.4)
MCH RBC QN AUTO: 28.8 PG (ref 26.8–34.3)
MCHC RBC AUTO-ENTMCNC: 33.1 G/DL (ref 31.4–37.4)
MCV RBC AUTO: 87 FL (ref 82–98)
PLATELET # BLD AUTO: 183 THOUSANDS/UL (ref 149–390)
PMV BLD AUTO: 11.5 FL (ref 8.9–12.7)
POTASSIUM SERPL-SCNC: 3.9 MMOL/L (ref 3.5–5.3)
PROT SERPL-MCNC: 5.3 G/DL (ref 6.4–8.4)
RBC # BLD AUTO: 4.03 MILLION/UL (ref 3.81–5.12)
SODIUM SERPL-SCNC: 143 MMOL/L (ref 135–147)
WBC # BLD AUTO: 4.66 THOUSAND/UL (ref 4.31–10.16)

## 2025-03-31 PROCEDURE — 80053 COMPREHEN METABOLIC PANEL: CPT

## 2025-03-31 PROCEDURE — 99238 HOSP IP/OBS DSCHRG MGMT 30/<: CPT | Performed by: INTERNAL MEDICINE

## 2025-03-31 PROCEDURE — 85027 COMPLETE CBC AUTOMATED: CPT

## 2025-03-31 RX ADMIN — CITALOPRAM HYDROBROMIDE 10 MG: 20 TABLET ORAL at 09:29

## 2025-03-31 RX ADMIN — PANTOPRAZOLE SODIUM 40 MG: 40 INJECTION, POWDER, LYOPHILIZED, FOR SOLUTION INTRAVENOUS at 09:28

## 2025-03-31 RX ADMIN — SUCRALFATE 1 G: 1 TABLET ORAL at 06:39

## 2025-03-31 RX ADMIN — ACETAMINOPHEN 650 MG: 325 TABLET, FILM COATED ORAL at 06:39

## 2025-03-31 RX ADMIN — ENOXAPARIN SODIUM 40 MG: 40 INJECTION SUBCUTANEOUS at 09:28

## 2025-03-31 NOTE — ASSESSMENT & PLAN NOTE
Negative CMP less likely hepatic or biliary etiology.  Negative lipase, less likely to be pancreatic etiology.  Troponin was negative, EKG negative for ST changes, less likely ACS.  History and characterization makes vascular causes less likely.  CT imaging showing nonspecific enteritis, mild bowel dilation which may represent ileus. However patient is having normal bowel movements as her previous schedules, no signs of diarrhea, symptoms of fever, unsure if patient has true enteritis.  Overall presentation, differential includes most likely enteritis, possibly viral etiology, food poisoning/food reaction.  Patient's clinical symptoms improved. Able to tolerate regular diet this morning.    Plan:  Continue PTA omeprazole 40 mg daily  Close follow-up with PCP within a week upon discharge  Recommended to present to ED if has worsening abdominal pain, nausea, and/or fevers

## 2025-03-31 NOTE — PLAN OF CARE
Problem: PAIN - ADULT  Goal: Verbalizes/displays adequate comfort level or baseline comfort level  Description: Interventions:- Encourage patient to monitor pain and request assistance- Assess pain using appropriate pain scale- Administer analgesics based on type and severity of pain and evaluate response- Implement non-pharmacological measures as appropriate and evaluate response- Consider cultural and social influences on pain and pain management- Notify physician/advanced practitioner if interventions unsuccessful or patient reports new pain  Outcome: Completed     Problem: INFECTION - ADULT  Goal: Absence or prevention of progression during hospitalization  Description: INTERVENTIONS:- Assess and monitor for signs and symptoms of infection- Monitor lab/diagnostic results- Monitor all insertion sites, i.e. indwelling lines, tubes, and drains- Monitor endotracheal if appropriate and nasal secretions for changes in amount and color- [place] appropriate cooling/warming therapies per order- Administer medications as ordered- Instruct and encourage patient and family to use good hand hygiene technique- Identify and instruct in appropriate isolation precautions for identified infection/condition  Outcome: Completed  Goal: Absence of fever/infection during neutropenic period  Description: INTERVENTIONS:- Monitor WBC  Outcome: Completed     Problem: SAFETY ADULT  Goal: Patient will remain free of falls  Description: INTERVENTIONS:- Educate patient/family on patient safety including physical limitations- Instruct patient to call for assistance with activity - Consult OT/PT to assist with strengthening/mobility - Keep Call bell within reach- Keep bed low and locked with side rails adjusted as appropriate- Keep care items and personal belongings within reach- Initiate and maintain comfort rounds- Make Fall Risk Sign visible to staff-   Outcome: Completed  Goal: Maintain or return to baseline ADL function  Description:  INTERVENTIONS:-  Assess patient's ability to carry out ADLs; assess patient's baseline for ADL function and identify physical deficits which impact ability to perform ADLs (bathing, care of mouth/teeth, toileting, grooming, dressing, etc.)- Assess/evaluate cause of self-care deficits - Assess range of motion- Assess patient's mobility; develop plan if impaired- Assess patient's need for assistive devices and provide as appropriate- Encourage maximum independence but intervene and supervise when necessary- Involve family in performance of ADLs- Assess for home care needs following discharge - Consider OT consult to assist with ADL evaluation and planning for discharge- Provide patient education as appropriate  Outcome: Completed  Goal: Maintains/Returns to pre admission functional level  Description: INTERVENTIONS:- Perform AM-PAC 6 Click Basic Mobility/ Daily Activity assessment daily.- Set and communicate daily mobility goal to care team and patient/family/caregiver. - Collaborate with rehabilitation services on mobility goals  Out of bed for toileting- Record patient progress and toleration of activity level   Outcome: Completed     Problem: DISCHARGE PLANNING  Goal: Discharge to home or other facility with appropriate resources  Description: INTERVENTIONS:- Identify barriers to discharge w/patient and caregiver- Arrange for needed discharge resources and transportation as appropriate- Identify discharge learning needs (meds, wound care, etc.)- Arrange for interpretive services to assist at discharge as needed- Refer to Case Management Department for coordinating discharge planning if the patient needs post-hospital services based on physician/advanced practitioner order or complex needs related to functional status, cognitive ability, or social support system  Outcome: Completed     Problem: Knowledge Deficit  Goal: Patient/family/caregiver demonstrates understanding of disease process, treatment plan,  medications, and discharge instructions  Description: Complete learning assessment and assess knowledge base.Interventions:- Provide teaching at level of understanding- Provide teaching via preferred learning methods  Outcome: Completed

## 2025-03-31 NOTE — UTILIZATION REVIEW
Initial Clinical Review    Admission: Date/Time/Statement:   Admission Orders (From admission, onward)       Ordered        03/30/25 0916  Inpatient Admission  Once            03/30/25 0857  Place in Observation  Once,   Status:  Canceled                          Orders Placed This Encounter   Procedures    Inpatient Admission     Standing Status:   Standing     Number of Occurrences:   1     Level of Care:   Med Surg [16]     Estimated length of stay:   More than 2 Midnights     Certification:   I certify that inpatient services are medically necessary for this patient for a duration of greater than two midnights. See H&P and MD Progress Notes for additional information about the patient's course of treatment.     ED Arrival Information       Expected   -    Arrival   3/30/2025 05:28    Acuity   Urgent              Means of arrival   Walk-In    Escorted by   Spouse    Service   Hospitalist    Admission type   Emergency              Arrival complaint   Abdominal Pain             Chief Complaint   Patient presents with    Abdominal Pain     Patient comes in reporting intermittent upper mid abd pain that woke her up at 0000. Denies V/D/constipation. +nausea. Rates pain 9/10.        Initial Presentation: 64 y.o. female with a PMH of gastritis, left bundle branch block (nonischemic causative), GERD, anxiety who presents with abdominal pain. Acute onset, started 12 AM this morning, localizes in the mid epigastric region, spastic, achy in characteristic, fluctuate intermittently, max of 8 out of 10 pain. Identified no alleviating factor. Plan: Inpatient admission for evaluation and treatment of abdominal pain, anxiety, PVC: IV fluids, Protonix bid, sucralfate, scheduled Tylenol, Tiigan prn, CMP and CBC in am, full liquid diet, continue home antianxiety and atenolol.     Date: 3/31   Day 2:     Internal medicine: IV Protonix bid. Sucralfate. Scheduled Tylenol. Tigan prn. Full liquid diet. Continue home antianxiety and  atenolol.     ED Treatment-Medication Administration from 03/30/2025 0528 to 03/30/2025 1013         Date/Time Order Dose Route Action     03/30/2025 0605 sucralfate (CARAFATE) tablet 1 g 1 g Oral Given     03/30/2025 0558 sodium chloride 0.9 % bolus 1,000 mL 1,000 mL Intravenous New Bag     03/30/2025 0602 ondansetron (ZOFRAN) injection 4 mg 4 mg Intravenous Given     03/30/2025 0605 ketorolac (TORADOL) injection 15 mg 15 mg Intravenous Given     03/30/2025 0602 pantoprazole (PROTONIX) injection 40 mg 40 mg Intravenous Given     03/30/2025 0654 iohexol (OMNIPAQUE) 350 MG/ML injection (MULTI-DOSE) 100 mL 100 mL Intravenous Given     03/30/2025 0843 sodium chloride 0.9 % bolus 1,000 mL 1,000 mL Intravenous New Bag            Scheduled Medications:  acetaminophen, 650 mg, Oral, Q6H MILADY  atenolol, 25 mg, Oral, Daily  citalopram, 10 mg, Oral, Daily  enoxaparin, 40 mg, Subcutaneous, Daily  pantoprazole, 40 mg, Intravenous, Q12H MILADY  sucralfate, 1 g, Oral, 4x Daily (AC & HS)      Continuous IV Infusions:     PRN Meds:  aluminum-magnesium hydroxide-simethicone, 30 mL, Oral, Q6H PRN  trimethobenzamide, 200 mg, Intramuscular, Q6H PRN      ED Triage Vitals   Temperature Pulse Respirations Blood Pressure SpO2 Pain Score   03/30/25 0549 03/30/25 0538 03/30/25 0538 03/30/25 0538 03/30/25 0538 03/30/25 0538   98.3 °F (36.8 °C) 84 20 127/69 99 % 9     Weight (last 2 days)       None            Vital Signs (last 3 days)       Date/Time Temp Pulse Resp BP MAP (mmHg) SpO2 O2 Device Patient Position - Orthostatic VS Pain    03/31/25 0929 -- 53 -- 128/62 -- -- -- -- --    03/31/25 0729 98 °F (36.7 °C) 54 18 130/61 88 97 % None (Room air) Lying No Pain    03/31/25 0639 -- -- -- -- -- -- -- -- 3    03/31/25 0000 -- -- -- -- -- -- -- -- No Pain    03/30/25 2208 98.1 °F (36.7 °C) 53 18 119/55 77 97 % None (Room air) Lying --    03/30/25 1739 -- -- -- -- -- -- -- -- No Pain    03/30/25 1509 98 °F (36.7 °C) 61 16 105/60 74 100 % None (Room  air) Lying --    03/30/25 1415 -- -- -- -- -- -- -- -- No Pain    03/30/25 1205 -- -- -- -- -- -- -- -- No Pain    03/30/25 1204 -- 51 -- 137/63 -- -- -- -- --    03/30/25 1013 97.6 °F (36.4 °C) 51 18 156/68 98 99 % None (Room air) Lying --    03/30/25 1000 -- 50 18 123/59 85 100 % None (Room air) Lying --    03/30/25 0900 -- 50 18 124/65 89 99 % None (Room air) Lying --    03/30/25 0830 -- 55 18 122/66 89 100 % None (Room air) Lying --    03/30/25 0730 -- 62 18 116/62 84 98 % None (Room air) Lying --    03/30/25 0709 -- -- -- -- -- -- -- -- 2    03/30/25 0700 -- 66 18 142/64 92 97 % None (Room air) Lying --    03/30/25 0630 -- 61 -- 117/62 83 97 % -- -- --    03/30/25 0549 98.3 °F (36.8 °C) -- -- -- -- -- -- -- --    03/30/25 0538 -- 84 20 127/69 -- 99 % None (Room air) Sitting 9              Pertinent Labs/Diagnostic Test Results:   Radiology:  CT abdomen pelvis with contrast   Final Interpretation by Wilbur Leach MD (03/30 0723)      Findings involving multiple mid to distal small bowel loops which may represent nonspecific enteritis, with associated mild bowel dilatation which may represent ileus, however early or partial obstruction is not entirely excluded. Small ascites.      Cholelithiasis.      The study was marked in EPIC for immediate notification.         Workstation performed: [id number]         XR chest 2 views   ED Interpretation by Zeyad Skaggs MD (03/30 0728)   Patient interpretation-no acute cardiopulmonary disease appreciated.      Final Interpretation by Matheus Narayan MD (03/31 1053)      No acute cardiopulmonary disease.            Resident: Sriram Joyner I, the attending radiologist, have reviewed the images and agree with the final report above.      Workstation performed: [id number]           Cardiology:  ECG 12 lead   Final Result by Noah Monge MD (03/30 0824)   Normal sinus rhythm   Non-specific intra-ventricular conduction block   Abnormal QRS-T angle, consider  primary T wave abnormality   Abnormal ECG   When compared with ECG of 15-Aly-2023 21:45,   Premature ventricular complexes are no longer Present   QRS axis Shifted right   T wave inversion now evident in Inferior leads   T wave amplitude has increased in Lateral leads   Confirmed by Noah Monge ([id number]) on 3/30/2025 8:23:58 AM        GI:  No orders to display           Results from last 7 days   Lab Units 03/31/25  0503 03/30/25  0557   WBC Thousand/uL 4.66 10.31*   HEMOGLOBIN g/dL 11.5 14.8   HEMATOCRIT % 35.0 43.6   PLATELETS Thousands/uL 183 252   TOTAL NEUT ABS Thousands/µL  --  8.71*         Results from last 7 days   Lab Units 03/31/25  0503 03/30/25  0557   SODIUM mmol/L 143 140   POTASSIUM mmol/L 3.9 4.1   CHLORIDE mmol/L 110* 103   CO2 mmol/L 29 25   ANION GAP mmol/L 4 12   BUN mg/dL 5 12   CREATININE mg/dL 0.65 0.67   EGFR ml/min/1.73sq m 94 93   CALCIUM mg/dL 8.3* 9.6     Results from last 7 days   Lab Units 03/31/25  0503 03/30/25  0557   AST U/L 17 23   ALT U/L 20 28   ALK PHOS U/L 44 65   TOTAL PROTEIN g/dL 5.3* 7.3   ALBUMIN g/dL 3.5 4.6   TOTAL BILIRUBIN mg/dL 0.77 0.76         Results from last 7 days   Lab Units 03/31/25  0503 03/30/25  0557   GLUCOSE RANDOM mg/dL 94 142*         Results from last 7 days   Lab Units 03/30/25  0758 03/30/25  0557   HS TNI 0HR ng/L  --  3   HS TNI 2HR ng/L 3  --    HSTNI D2 ng/L 0  --            Results from last 7 days   Lab Units 03/30/25  1008 03/30/25  0758 03/30/25  0603   LACTIC ACID mmol/L 1.1 2.3* 2.8*           Results from last 7 days   Lab Units 03/30/25  0557   LIPASE u/L 11                 Results from last 7 days   Lab Units 03/30/25  0713   CLARITY UA  Clear   COLOR UA  Colorless   SPEC GRAV UA  1.043*   PH UA  5.5   GLUCOSE UA mg/dl Negative   KETONES UA mg/dl Negative   BLOOD UA  Negative   PROTEIN UA mg/dl Negative   NITRITE UA  Negative   BILIRUBIN UA  Negative   UROBILINOGEN UA (BE) mg/dl <2.0   LEUKOCYTES UA  Negative         Past Medical  History:   Diagnosis Date    Abnormal ECG 04/2009    Anxiety     Arthritis     Fibroids     GERD (gastroesophageal reflux disease)     Heart disease     Heart murmur 1988    Heart valve disease     HL (hearing loss)     Deaf right ear    Mitral valve prolapse 1988    Shingles     Skin tag     Varicella      Present on Admission:   Anxiety   PVC (premature ventricular contraction)      Admitting Diagnosis: Enteritis [K52.9]  Epigastric pain [R10.13]  Abdominal pain [R10.9]  Nausea and vomiting [R11.2]  Elevated lactic acid level [R79.89]  Age/Sex: 64 y.o. female    Network Utilization Review Department  ATTENTION: Please call with any questions or concerns to [phone] and carefully listen to the prompts so that you are directed to the right person. All voicemails are confidential.   For Discharge needs, contact Care Management DC Support Team at [phone] opt. 2  Send all requests for admission clinical reviews, approved or denied determinations and any other requests to dedicated fax number below belonging to the campus where the patient is receiving treatment. List of dedicated fax numbers for the Facilities:  FACILITY NAME UR FAX NUMBER   ADMISSION DENIALS (Administrative/Medical Necessity) [phone]   DISCHARGE SUPPORT TEAM (NETWORK) [phone]   PARENT CHILD HEALTH (Maternity/NICU/Pediatrics) [phone]   [facility] [phone]   [facility] [phone]   [facility] [phone]   [facility] [phone]   [facility] [phone]   [facility] [phone]   [facility] [phone]   [facility] [phone]   [facility] [phone]   [facility] [phone]   [facility] [phone]   [facility]  [facility] [phone]

## 2025-03-31 NOTE — INCIDENTAL FINDINGS
The following findings require follow up:  Radiographic finding   Finding:   CT abdomen pelvis with contrast: Findings involving multiple mid to distal small bowel loops which may represent nonspecific enteritis, with associated mild bowel dilatation which may represent ileus, however early or partial obstruction is not entirely excluded. Small ascites., Cholelithiasis.    Follow up required: Yes    Please notify the following clinician to assist with the follow up:   Dr. Naty Holbrook    Incidental finding results were discussed with the Patient by Taisha Ochoa MD on 03/31/25.   They expressed understanding and all questions answered.

## 2025-03-31 NOTE — DISCHARGE SUMMARY
Discharge Summary - Hospitalist   Name: Shell Dey 64 y.o. female I MRN: [id number]  Unit/Bed#: [facility] I Date of Admission: 3/30/2025   Date of Service: 3/31/2025 I Hospital Day: 1     Assessment & Plan  Abdominal pain  Negative CMP less likely hepatic or biliary etiology.  Negative lipase, less likely to be pancreatic etiology.  Troponin was negative, EKG negative for ST changes, less likely ACS.  History and characterization makes vascular causes less likely.  CT imaging showing nonspecific enteritis, mild bowel dilation which may represent ileus. However patient is having normal bowel movements as her previous schedules, no signs of diarrhea, symptoms of fever, unsure if patient has true enteritis.  Overall presentation, differential includes most likely enteritis, possibly viral etiology, food poisoning/food reaction.  Patient's clinical symptoms improved. Able to tolerate regular diet this morning.    Plan:  Continue PTA omeprazole 40 mg daily  Close follow-up with PCP within a week upon discharge  Recommended to present to ED if has worsening abdominal pain, nausea, and/or fevers  Anxiety  Continue home anxiolytic  PVC (premature ventricular contraction)  Continue PTA atenolol     Medical Problems       Resolved Problems  Date Reviewed: 3/30/2025   None       Discharging Physician / Practitioner: Taisha Ochoa MD  PCP: Naty Holbrook DO  Admission Date:   Admission Orders (From admission, onward)       Ordered        03/30/25 0916  Inpatient Admission  Once            03/30/25 0857  Place in Observation  Once,   Status:  Canceled                          Discharge Date: 03/31/25    Consultations During Hospital Stay:  None    Procedures Performed:   None    Significant Findings / Test Results:   CT abdomen pelvis w contrast showed multiple mid to distal small bowel loops which may represent nonspecific enteritis, with associated mild bowel dilatation which may represent ileus, however early or  partial obstruction is not entirely excluded. Small ascites.     Incidental Findings:    CT abdomen pelvis with contrast: Findings involving multiple mid to distal small bowel loops which may represent nonspecific enteritis, with associated mild bowel dilatation which may represent ileus, however early or partial obstruction is not entirely excluded. Small ascites., Cholelithiasis.   I reviewed the above mentioned incidental findings with the patient and/or family and they expressed understanding.    Test Results Pending at Discharge (will require follow up):   None     Outpatient Tests Requested:  None    Complications:  None    Reason for Admission: Abdominal pain    Hospital Course:   Shell Dey is a 64 y.o. female with PMH of gastritis, LBBB(nonischemic causative), GERD, anxiety, who originally presented to the hospital on 3/30/2025 due to epigastric abdominal pain. Initial lab workup overall unremarkable for hepatic, biliary, or pancreatic etiology. CT abdomen and pelvis noted findings suggestive of enteritis, with mild bowel dilation which may represent ileus. Patient monitored overnight. Upon reevaluation the following day, patient reports improvement in symptoms. No nausea, abdominal pain, diarrhea. Passing gas. Abdominal exam benign. She is medically stable for discharge back to home with plan to continue omeprazole 40 mg daily. To note, patient was on carafate per GI. This was discontinued after EGD was unremarkable, negative H.pylori. Recommend close follow-up with PCP upon discharge for transition of care.        Please see above list of diagnoses and related plan for additional information.     Condition at Discharge: fair    Discharge Day Visit / Exam:   Subjective:  No acute events overnight. Patient seen and examined at bedside. Feels better. No nausea, vomiting, diarrhea, abdominal pain. Agreeable to advancing diet.  Vitals: Blood Pressure: 128/62 (03/31/25 0929)  Pulse: (!) 53 (03/31/25  0929)  Temperature: 98 °F (36.7 °C) (03/31/25 0729)  Temp Source: Oral (03/31/25 0729)  Respirations: 18 (03/31/25 0729)  SpO2: 97 % (03/31/25 0729)  Physical Exam  Vitals and nursing note reviewed.   Constitutional:       General: She is not in acute distress.     Appearance: She is well-developed.   HENT:      Head: Normocephalic and atraumatic.   Eyes:      Conjunctiva/sclera: Conjunctivae normal.   Cardiovascular:      Rate and Rhythm: Normal rate and regular rhythm.      Heart sounds: No murmur heard.  Pulmonary:      Effort: Pulmonary effort is normal. No respiratory distress.      Breath sounds: Normal breath sounds.   Abdominal:      General: Bowel sounds are normal.      Palpations: Abdomen is soft.      Tenderness: There is no abdominal tenderness.   Musculoskeletal:         General: No swelling.      Cervical back: Neck supple.   Skin:     General: Skin is warm and dry.      Capillary Refill: Capillary refill takes less than 2 seconds.   Neurological:      Mental Status: She is alert.   Psychiatric:         Mood and Affect: Mood normal.         Discussion with Family: Updated  () at bedside.    Discharge instructions/Information to patient and family:   See after visit summary for information provided to patient and family.      Provisions for Follow-Up Care:  See after visit summary for information related to follow-up care and any pertinent home health orders.      Mobility at time of Discharge:   Basic Mobility Inpatient Raw Score: 24  JH-HLM Goal: 8: Walk 250 feet or more  JH-HLM Achieved: 8: Walk 250 feet ot more  HLM Goal achieved. Continue to encourage appropriate mobility.     Disposition:   Home    Planned Readmission: No    Discharge Medications:  See after visit summary for reconciled discharge medications provided to patient and/or family.      Administrative Statements   Discharge Statement:  I have spent a total time of >30 minutes in caring for this patient on the day  of the visit/encounter. .    **Please Note: This note may have been constructed using a voice recognition system**

## 2025-03-31 NOTE — DISCHARGE INSTR - AVS FIRST PAGE
Dear Shell Dey,     It was our pleasure to care for you here at [facility].  It is our hope that we were always able to exceed the expected standards for your care during your stay.  You were hospitalized due to abdominal pain and nausea.  You were cared for on the first floor by Taisha Ochoa MD under the service of Damien Clement MD with the [facility] who covers for your primary care physician (PCP), Naty Holbrook DO, while you were hospitalized.  If you have any questions or concerns related to this hospitalization, you may contact us at .  For follow up as well as any medication refills, we recommend that you follow up with your primary care physician.  A registered nurse will reach out to you by phone within a few days after your discharge to answer any additional questions that you may have after going home.  However, at this time we provide for you here, the most important instructions / recommendations at discharge:     Notable Medication Adjustments -   No new medication changes  Continue taking the rest of your medications as prescribed  Testing Required after Discharge -   None  ** Please contact your PCP to request testing orders for any of the testing recommended here **  Important follow up information -   Please follow-up with your primary care provider within a week upon discharge  Other Instructions -   If you have worsening abdominal pain, nausea, or fever, please present to your nearest emergency room.  Please review this entire after visit summary as additional general instructions including medication list, appointments, activity, diet, any pertinent wound care, and other additional recommendations from your care team that may be provided for you.      Sincerely,     Taisha Ochoa MD

## 2025-04-01 ENCOUNTER — TRANSITIONAL CARE MANAGEMENT (OUTPATIENT)
Dept: FAMILY MEDICINE CLINIC | Facility: CLINIC | Age: 65
End: 2025-04-01

## 2025-04-01 NOTE — UTILIZATION REVIEW
NOTIFICATION OF ADMISSION DISCHARGE   This is a Notification of Discharge from [facility]. Please be advised that this patient has been discharge from our facility. Below you will find the admission and discharge date and time including the patient’s disposition.   UTILIZATION REVIEW CONTACT:  Utilization Review Assistants  Network Utilization Review Department  Phone: [phone] x carefully listen to the prompts. All voicemails are confidential.  Email: [email or web address]     ADMISSION INFORMATION  PRESENTATION DATE: 3/30/2025  5:32 AM  OBERVATION ADMISSION DATE: 03/30/2025 0857  INPATIENT ADMISSION DATE: 3/30/25  9:16 AM   DISCHARGE DATE: 3/31/2025  1:31 PM   DISPOSITION:Home/Self Care    Network Utilization Review Department  ATTENTION: Please call with any questions or concerns to [phone] and carefully listen to the prompts so that you are directed to the right person. All voicemails are confidential.   For Discharge needs, contact Care Management DC Support Team at [phone] opt. 2  Send all requests for admission clinical reviews, approved or denied determinations and any other requests to dedicated fax number below belonging to the campus where the patient is receiving treatment. List of dedicated fax numbers for the Facilities:  FACILITY NAME UR FAX NUMBER   ADMISSION DENIALS (Administrative/Medical Necessity) [phone]   DISCHARGE SUPPORT TEAM ([facility]) [phone]   PARENT CHILD HEALTH (Maternity/NICU/Pediatrics) [phone]   [facility] [phone]   [facility] [phone]   [facility] [phone]   [facility] [phone]   [facility] [phone]   [facility] [phone]   [facility] [phone]   [facility] [phone]    [facility] [phone]   [facility] [phone]   [facility] [phone]   [facility] [phone]

## 2025-04-09 ENCOUNTER — CONSULT (OUTPATIENT)
Dept: GASTROENTEROLOGY | Facility: CLINIC | Age: 65
End: 2025-04-09
Payer: COMMERCIAL

## 2025-04-09 VITALS
SYSTOLIC BLOOD PRESSURE: 124 MMHG | OXYGEN SATURATION: 95 % | BODY MASS INDEX: 26.35 KG/M2 | TEMPERATURE: 97.2 F | DIASTOLIC BLOOD PRESSURE: 62 MMHG | WEIGHT: 143.2 LBS | HEIGHT: 62 IN | HEART RATE: 64 BPM

## 2025-04-09 DIAGNOSIS — K52.9 ENTERITIS: ICD-10-CM

## 2025-04-09 DIAGNOSIS — Z80.0 FAMILY HISTORY OF COLON CANCER: ICD-10-CM

## 2025-04-09 DIAGNOSIS — K58.1 IRRITABLE BOWEL SYNDROME WITH CONSTIPATION: ICD-10-CM

## 2025-04-09 DIAGNOSIS — K21.9 GASTROESOPHAGEAL REFLUX DISEASE WITHOUT ESOPHAGITIS: ICD-10-CM

## 2025-04-09 DIAGNOSIS — K59.01 SLOW TRANSIT CONSTIPATION: Primary | ICD-10-CM

## 2025-04-09 PROCEDURE — 99204 OFFICE O/P NEW MOD 45 MIN: CPT | Performed by: INTERNAL MEDICINE

## 2025-04-09 RX ORDER — SUCRALFATE 1 G/1
TABLET ORAL
COMMUNITY
Start: 2025-03-30

## 2025-04-09 RX ORDER — LINACLOTIDE 72 UG/1
72 CAPSULE, GELATIN COATED ORAL DAILY
Qty: 31 CAPSULE | Refills: 3 | Status: SHIPPED | OUTPATIENT
Start: 2025-04-09

## 2025-04-09 NOTE — PROGRESS NOTES
Name: Shell Dey      : 1960      MRN: [id number]  Encounter Provider: Braxton Elizalde DO  Encounter Date: 2025   Encounter department: [facility] [place]  :  Assessment & Plan  Slow transit constipation  Continue MiraLAX as needed  High-fiber diet daily  Increase water intake daily    Orders:    linaCLOtide (Linzess) 72 MCG CAPS; Take 72 mcg by mouth daily    Irritable bowel syndrome with constipation  High fiber diet daily  8 x 8 ounce glasses of water daily  Fiber supplementation twice daily  Continue probiotic daily  Aloe vera juice daily  Peppermint oil capsules twice daily  Gastroesophageal reflux disease without esophagitis  Continue omeprazole 40 mg daily  Continue with sucralfate as prescribed  No lying down within 1 hour of snacking or eating  Enteritis  ED/hospitalization note from 3/30 through 3/31/2025 reviewed  CTAP with contrast on 3/30/2025, personally viewed  All labs from 3/30/2025 reviewed  Overall, I suspect that this may have been a foodborne illness.  Family history of colon cancer  Her father was diagnosed with colon cancer  Colonoscopy to be scheduled 2026  Colonoscopy from 10/19/2021 reviewed      History of Present Illness     Shell Dey is a 64 y.o. female who presents to the office today for the evaluation of possible enteritis.  She was seen in the ED on 3/30/2025.  Chief complaint at the time of the evaluation was abdominal pain, nausea, without vomiting which woke her from sleep.  The pain was located in the epigastrium.  She did take a Pepcid prior to the arrival to the emergency room but this did not help.  She denied fevers or chills.  There is no chest pain or shortness of breath.  CT scan of the abdomen and pelvis with contrast demonstrated multiple mid to distal small bowel loops with mild dilation which they felt could represent either an ileus or nonspecific enteritis.  There was a small amount of ascites.  CBC  demonstrated a WBC of 10.31, hemoglobin 14.8, platelet 252.  Her serum chemistry panel was remarkable for a glucose level of 142 but otherwise normal.  Her troponins were normal.  Her lipase level was normal.  Lactic acid level was elevated to 2.8.  She was discharged from the hospital 1 day later with improvement in the abdominal pain and bloating.  Referral was made to gastroenterology.    She suffers from a longstanding history of constipation for several decades.  She had undergone colonoscopy on 10/19/2021 which showed a polyp and diverticulosis, and internal hemorrhoids.  She states that she eats plenty of fiber on a daily basis and drink plenty of water on a daily basis.  She denies any bright red blood per rectum or melena.  She does have heartburn occasionally and sometimes frequently.  She denies abdominal pain, nausea, vomiting.  Her bowel movements have been abnormal for more than 2 decades.  She now has either palpable bowel movements or watery diarrhea.  There is no associated rectal pain.  There is bloating and gaseousness.  She states that she had constipation problems when she was a teenager.  She also has a history of heartburn which is quite frequent.  She had been taking omeprazole 40 mg daily between 2009 and 2017.  Her PCP took her off this medication.  She was restarted again on omeprazole in August 2024.  Initially she was seeing successful improvement of the heartburn but then when she became ill requiring this brief hospitalization, the heartburn seem to get worse.  Her father did has a diagnosis of colon cancer.  She takes her MiraLAX on an as-needed basis.  She is having bowel movements 1-2 times weekly.  She states that she is not regular.  Here lately she has been experiencing evidence of either pebble-like stool or loose diarrhea.  She is now on sucralfate which she is taking 4 times a day and she states that this is helping the bloating as well as the heartburn.       Objective BP  "124/62 (BP Location: Right arm, Patient Position: Sitting, Cuff Size: Standard)   Pulse 64   Temp (!) 97.2 °F (36.2 °C) (Temporal)   Ht 5' 2\" (1.575 m)   Wt 65 kg (143 lb 3.2 oz)   SpO2 95%   BMI 26.19 kg/m²      Physical Exam  Vitals reviewed.   Constitutional:       General: She is not in acute distress.     Appearance: Normal appearance. She is not ill-appearing.   Eyes:      General: No scleral icterus.     Extraocular Movements: Extraocular movements intact.      Pupils: Pupils are equal, round, and reactive to light.   Cardiovascular:      Rate and Rhythm: Normal rate and regular rhythm.      Pulses: Normal pulses.      Heart sounds: Normal heart sounds. No murmur heard.  Pulmonary:      Effort: Pulmonary effort is normal.      Breath sounds: Normal breath sounds. No wheezing, rhonchi or rales.   Abdominal:      Palpations: Abdomen is soft. There is no mass.      Tenderness: There is no abdominal tenderness. There is no guarding or rebound.   Musculoskeletal:      Right lower leg: No edema.      Left lower leg: No edema.   Skin:     General: Skin is warm and dry.      Coloration: Skin is not jaundiced.      Findings: No rash.   Neurological:      General: No focal deficit present.      Mental Status: She is alert and oriented to person, place, and time.   Psychiatric:         Mood and Affect: Mood normal.         Behavior: Behavior normal.           "

## 2025-04-10 ENCOUNTER — OFFICE VISIT (OUTPATIENT)
Dept: FAMILY MEDICINE CLINIC | Facility: MEDICAL CENTER | Age: 65
End: 2025-04-10
Payer: COMMERCIAL

## 2025-04-10 VITALS
BODY MASS INDEX: 27.09 KG/M2 | WEIGHT: 147.2 LBS | RESPIRATION RATE: 20 BRPM | DIASTOLIC BLOOD PRESSURE: 80 MMHG | TEMPERATURE: 97.8 F | SYSTOLIC BLOOD PRESSURE: 110 MMHG | HEIGHT: 62 IN | OXYGEN SATURATION: 99 % | HEART RATE: 50 BPM

## 2025-04-10 DIAGNOSIS — H10.31 ACUTE CONJUNCTIVITIS OF RIGHT EYE, UNSPECIFIED ACUTE CONJUNCTIVITIS TYPE: Primary | ICD-10-CM

## 2025-04-10 PROCEDURE — 99213 OFFICE O/P EST LOW 20 MIN: CPT | Performed by: INTERNAL MEDICINE

## 2025-04-10 RX ORDER — OFLOXACIN 3 MG/ML
2 SOLUTION/ DROPS OPHTHALMIC 4 TIMES DAILY
Qty: 10 ML | Refills: 0 | Status: SHIPPED | OUTPATIENT
Start: 2025-04-10

## 2025-04-10 NOTE — PROGRESS NOTES
Name: Shell Dey      : 1960      MRN: [id number]  Encounter Provider: Earnest Pisano MD  Encounter Date: 4/10/2025   Encounter department: [facility] [place]  :  Assessment & Plan  Acute conjunctivitis of right eye, unspecified acute conjunctivitis type    Orders:    ofloxacin (OCUFLOX) 0.3 % ophthalmic solution; Administer 2 drops to the right eye 4 (four) times a day    A couple of days ago patient seemed to have injured her right eye due to the contacts.  She continues to have a gritty feeling in the eye.  Was advised to use the ofloxacin eyedrops till she gets an appointment with her ophthalmologist.  It is imperative that she sees her ophthalmologist to rule out any abrasions in the eye.       History of Present Illness   HPI  About 3 days ago patient had some irritation in her right eye and she thought it was her contacts.  She did not wear her contacts for a day but then wore them again and the irritation got worse.  She has a gritty feeling in her right eye with photophobia.  There is no discharge or itching in the eye but there is discomfort.    Review of Systems   Constitutional:  Negative for chills, diaphoresis, fatigue and fever.   HENT:  Negative for congestion, ear discharge, ear pain, hearing loss, postnasal drip, rhinorrhea, sinus pressure, sinus pain, sneezing, sore throat and voice change.    Eyes:  Positive for photophobia, pain, redness and visual disturbance. Negative for discharge.   Respiratory:  Negative for cough, chest tightness, shortness of breath and wheezing.    Cardiovascular:  Negative for chest pain, palpitations and leg swelling.   Gastrointestinal:  Negative for abdominal distention, abdominal pain, blood in stool, constipation, diarrhea, nausea and vomiting.   Endocrine: Negative for cold intolerance, heat intolerance, polydipsia, polyphagia and polyuria.   Genitourinary:  Negative for dysuria, flank pain, frequency, hematuria and urgency.  "  Musculoskeletal:  Negative for arthralgias, back pain, gait problem, joint swelling, myalgias, neck pain and neck stiffness.   Skin:  Negative for rash.   Neurological:  Negative for dizziness, tremors, syncope, facial asymmetry, speech difficulty, weakness, light-headedness, numbness and headaches.   Hematological:  Does not bruise/bleed easily.   Psychiatric/Behavioral:  Negative for behavioral problems, confusion and sleep disturbance. The patient is not nervous/anxious.        Objective   /80 (BP Location: Left arm, Patient Position: Sitting, Cuff Size: Standard)   Pulse (!) 50   Temp 97.8 °F (36.6 °C) (Temporal)   Resp 20   Ht 5' 2\" (1.575 m)   Wt 66.8 kg (147 lb 3.2 oz)   SpO2 99%   BMI 26.92 kg/m²      Physical Exam  Constitutional:       Appearance: Normal appearance.   HENT:      Head: Normocephalic.   Eyes:      Comments: There is some redness in the right eye.  No discharge or watering seen.  No obvious injury seen   Pulmonary:      Effort: Pulmonary effort is normal.   Neurological:      Mental Status: She is alert and oriented to person, place, and time.   Psychiatric:         Behavior: Behavior normal.         "

## 2025-04-14 ENCOUNTER — PATIENT MESSAGE (OUTPATIENT)
Dept: FAMILY MEDICINE CLINIC | Facility: MEDICAL CENTER | Age: 65
End: 2025-04-14

## 2025-04-14 ENCOUNTER — OFFICE VISIT (OUTPATIENT)
Dept: FAMILY MEDICINE CLINIC | Facility: MEDICAL CENTER | Age: 65
End: 2025-04-14
Payer: COMMERCIAL

## 2025-04-14 VITALS
RESPIRATION RATE: 16 BRPM | DIASTOLIC BLOOD PRESSURE: 82 MMHG | HEART RATE: 52 BPM | TEMPERATURE: 98.4 F | SYSTOLIC BLOOD PRESSURE: 122 MMHG | BODY MASS INDEX: 26.91 KG/M2 | OXYGEN SATURATION: 98 % | HEIGHT: 62 IN | WEIGHT: 146.2 LBS

## 2025-04-14 DIAGNOSIS — Z78.9 TRANSITION OF CARE: Primary | ICD-10-CM

## 2025-04-14 DIAGNOSIS — E78.5 HYPERLIPIDEMIA, UNSPECIFIED HYPERLIPIDEMIA TYPE: ICD-10-CM

## 2025-04-14 PROCEDURE — 99495 TRANSJ CARE MGMT MOD F2F 14D: CPT | Performed by: STUDENT IN AN ORGANIZED HEALTH CARE EDUCATION/TRAINING PROGRAM

## 2025-04-14 NOTE — PROGRESS NOTES
Transition of Care Visit:  Name: Shell Dey      : 1960      MRN: [id number]  Encounter Provider: Naty Holbrook DO  Encounter Date: 2025   Encounter department: [facility] [place]    Assessment & Plan  Transition of care  Benign abdominal exam today  She is to continue measures as discussed by her gastroenterologist for IBS with constipation and GERD  Encouraged adequate hydration, fiber intake and regular exercise       Hyperlipidemia, unspecified hyperlipidemia type    Orders:    Lipid Panel with Direct LDL reflex; Future         History of Present Illness     Transitional Care Management Review:   Shell Dey is a 64 y.o. female here for TCM follow up.     During the TCM phone call patient stated:  TCM Call (since 3/31/2025)       Hospital care reviewed  Records reviewed    Patient was hospitialized at  [facility]    Date of Admission  25    Date of discharge  25    Diagnosis  Abdominal pain    Disposition  Home    Were the patients medications reviewed and updated  Yes    Current Symptoms  None          TCM Call (since 3/31/2025)       Post hospital issues  None    Scheduled for follow up?  Yes    Did you obtain your prescribed medications  Yes    Do you need help managing your prescriptions or medications  No    Is transportation to your appointment needed  No    I have advised the patient to call PCP with any new or worsening symptoms  ABDIRIZAK Bedoya    Living Arrangements  Spouse or Significiant other    Support System  Spouse    The type of support provided  Emotional; Financial; Physical    Do you have social support  Yes, as much as I need    Are you recieving home care services  No          HPI    Patient was hospitalized at [facility] from 2025 to 2025 for abdominal pain.  Since discharge from hospital patient states she has felt better but did have some constipation.  Patient did see  "gastroenterologist last week.  Patient is able to tolerate current diet without issue.  She has been more conscientious of hydrating well.    Hospital Course per discharge summary:   Shell Dey is a 64 y.o. female with PMH of gastritis, LBBB(nonischemic causative), GERD, anxiety, who originally presented to the hospital on 3/30/2025 due to epigastric abdominal pain. Initial lab workup overall unremarkable for hepatic, biliary, or pancreatic etiology. CT abdomen and pelvis noted findings suggestive of enteritis, with mild bowel dilation which may represent ileus. Patient monitored overnight. Upon reevaluation the following day, patient reports improvement in symptoms. No nausea, abdominal pain, diarrhea. Passing gas. Abdominal exam benign. She is medically stable for discharge back to home with plan to continue omeprazole 40 mg daily. To note, patient was on carafate per GI. This was discontinued after EGD was unremarkable, negative H.pylori. Recommend close follow-up with PCP upon discharge for transition of care.  Review of Systems    As noted in HPI   Objective   /82 (BP Location: Left arm, Patient Position: Sitting, Cuff Size: Large)   Pulse (!) 52   Temp 98.4 °F (36.9 °C) (Temporal)   Resp 16   Ht 5' 2\" (1.575 m)   Wt 66.3 kg (146 lb 3.2 oz)   SpO2 98%   BMI 26.74 kg/m²     Physical Exam  Vitals reviewed.   Constitutional:       General: She is not in acute distress.     Appearance: Normal appearance. She is not ill-appearing or toxic-appearing.   HENT:      Head: Normocephalic and atraumatic.   Eyes:      Conjunctiva/sclera: Conjunctivae normal.   Cardiovascular:      Rate and Rhythm: Regular rhythm. Bradycardia present.      Pulses: Normal pulses.      Heart sounds: Normal heart sounds.   Pulmonary:      Effort: Pulmonary effort is normal.      Breath sounds: Normal breath sounds.   Abdominal:      General: Abdomen is flat. Bowel sounds are normal. There is no distension.      Palpations: " Abdomen is soft.      Tenderness: There is no abdominal tenderness. There is no guarding or rebound.   Skin:     General: Skin is warm and dry.   Neurological:      Mental Status: She is alert and oriented to person, place, and time.   Psychiatric:         Mood and Affect: Mood normal.         Behavior: Behavior normal.         Thought Content: Thought content normal.       Medications have been reviewed by provider in current encounter

## 2025-04-22 DIAGNOSIS — K21.9 GASTROESOPHAGEAL REFLUX DISEASE WITHOUT ESOPHAGITIS: Primary | ICD-10-CM

## 2025-04-23 RX ORDER — SUCRALFATE 1 G/1
1 TABLET ORAL 4 TIMES DAILY
Qty: 120 TABLET | Refills: 0 | Status: SHIPPED | OUTPATIENT
Start: 2025-04-23

## 2025-04-23 NOTE — TELEPHONE ENCOUNTER
Patient called the refill line for this medication. Informed her that the medication was sent for review

## 2025-05-09 ENCOUNTER — OFFICE VISIT (OUTPATIENT)
Dept: GASTROENTEROLOGY | Facility: CLINIC | Age: 65
End: 2025-05-09

## 2025-05-09 VITALS
HEIGHT: 62 IN | WEIGHT: 147.8 LBS | OXYGEN SATURATION: 99 % | DIASTOLIC BLOOD PRESSURE: 70 MMHG | HEART RATE: 51 BPM | TEMPERATURE: 97.2 F | BODY MASS INDEX: 27.2 KG/M2 | SYSTOLIC BLOOD PRESSURE: 120 MMHG

## 2025-05-09 DIAGNOSIS — K59.01 SLOW TRANSIT CONSTIPATION: ICD-10-CM

## 2025-05-09 DIAGNOSIS — K58.1 IRRITABLE BOWEL SYNDROME WITH CONSTIPATION: Primary | ICD-10-CM

## 2025-05-09 RX ORDER — CYCLOSPORINE 0.5 MG/ML
1 EMULSION OPHTHALMIC EVERY 12 HOURS
COMMUNITY
Start: 2025-04-21

## 2025-05-09 NOTE — PROGRESS NOTES
Name: Shell Dey      : 1960      MRN: [id number]  Encounter Provider: Braxton Elizalde DO  Encounter Date: 2025   Encounter department: [facility] [place]    Assessment & Plan      Assessment & Plan  1. Irritable bowel syndrome:  - Continue current regimen of Metamucil, aloe vera juice, and peppermint oil.  - Use MiraLAX only if there are 3 to 4 days without a bowel movement.  - Maintain hydration with significant water intake daily.  - No need for Linzess due to effective natural remedies and pending insurance approval.    2. Heartburn:  - Discontinue Carafate.  - Maintain current dosage of omeprazole 40 mg in the morning.    Follow-up: Follow up in 3 months.    History of Present Illness  The patient presents for evaluation of irritable bowel syndrome and heartburn.    She has been managing her irritable bowel syndrome with natural remedies, including peppermint oil and aloe vera juice, which have proven effective. She has also been maintaining a log of her bowel movements, recording 19 instances over the past 30 days, most of which were formed and smooth. She experienced constipation on 1 or 2 days, characterized by pebble-like stools, and had a single day of loose stools. Her primary care physician advised her to take MiraLAX only if she goes 3 to 4 days without a bowel movement. She reports no abdominal pain but does experience flatulence. She continues to take Metamucil for fiber supplementation and consumes a significant amount of water daily. She has not been using Linzess due to pending insurance approval.    She has had only 1 episode of heartburn since her last visit. Her primary care physician suggested that her heartburn could be a result of her gastrointestinal issues. She is currently on omeprazole 40 mg in the morning and Carafate, but expresses a desire to discontinue the latter.    SOCIAL HISTORY  Marital Status: Retired  Diet: Drinks a lot of  "water, takes Metamucil for fiber, uses aloe vera juice and peppermint oil.       Objective   /70 (BP Location: Right arm, Patient Position: Sitting, Cuff Size: Standard)   Pulse (!) 51   Temp (!) 97.2 °F (36.2 °C) (Temporal)   Ht 5' 2\" (1.575 m)   Wt 67 kg (147 lb 12.8 oz)   SpO2 99%   BMI 27.03 kg/m²     Physical Exam  Lungs: Clear to auscultation bilaterally.  Abdomen: Non-tender to palpation.  Physical Exam  Vitals reviewed.   Constitutional:       General: She is not in acute distress.     Appearance: Normal appearance. She is not ill-appearing.   Eyes:      General: No scleral icterus.     Extraocular Movements: Extraocular movements intact.      Pupils: Pupils are equal, round, and reactive to light.   Cardiovascular:      Rate and Rhythm: Normal rate and regular rhythm.      Pulses: Normal pulses.      Heart sounds: Normal heart sounds. No murmur heard.  Pulmonary:      Effort: Pulmonary effort is normal.      Breath sounds: Normal breath sounds. No wheezing, rhonchi or rales.   Abdominal:      Palpations: Abdomen is soft. There is no mass.      Tenderness: There is no abdominal tenderness. There is no guarding or rebound.   Musculoskeletal:      Right lower leg: No edema.      Left lower leg: No edema.   Skin:     General: Skin is warm and dry.      Coloration: Skin is not jaundiced.      Findings: No rash.   Neurological:      General: No focal deficit present.      Mental Status: She is alert and oriented to person, place, and time.   Psychiatric:         Mood and Affect: Mood normal.         Behavior: Behavior normal.         "

## 2025-05-15 DIAGNOSIS — K21.9 GASTROESOPHAGEAL REFLUX DISEASE WITHOUT ESOPHAGITIS: ICD-10-CM

## 2025-05-16 RX ORDER — SUCRALFATE 1 G/1
1 TABLET ORAL 4 TIMES DAILY
Qty: 360 TABLET | Refills: 1 | Status: SHIPPED | OUTPATIENT
Start: 2025-05-16

## 2025-05-23 ENCOUNTER — APPOINTMENT (OUTPATIENT)
Dept: LAB | Facility: MEDICAL CENTER | Age: 65
End: 2025-05-23
Attending: STUDENT IN AN ORGANIZED HEALTH CARE EDUCATION/TRAINING PROGRAM
Payer: COMMERCIAL

## 2025-05-23 DIAGNOSIS — E78.5 HYPERLIPIDEMIA, UNSPECIFIED HYPERLIPIDEMIA TYPE: ICD-10-CM

## 2025-05-23 LAB
CHOLEST SERPL-MCNC: 225 MG/DL (ref ?–200)
HDLC SERPL-MCNC: 67 MG/DL
LDLC SERPL CALC-MCNC: 139 MG/DL (ref 0–100)
TRIGL SERPL-MCNC: 97 MG/DL (ref ?–150)

## 2025-05-23 PROCEDURE — 80061 LIPID PANEL: CPT

## 2025-05-23 PROCEDURE — 36415 COLL VENOUS BLD VENIPUNCTURE: CPT

## 2025-05-27 ENCOUNTER — RESULTS FOLLOW-UP (OUTPATIENT)
Dept: FAMILY MEDICINE CLINIC | Facility: MEDICAL CENTER | Age: 65
End: 2025-05-27

## 2025-05-28 DIAGNOSIS — K29.70 GASTRITIS WITHOUT BLEEDING, UNSPECIFIED CHRONICITY, UNSPECIFIED GASTRITIS TYPE: Primary | ICD-10-CM

## 2025-05-28 RX ORDER — OMEPRAZOLE 40 MG/1
40 CAPSULE, DELAYED RELEASE ORAL DAILY
Qty: 90 CAPSULE | Refills: 0 | Status: SHIPPED | OUTPATIENT
Start: 2025-05-28

## 2025-05-29 ENCOUNTER — OFFICE VISIT (OUTPATIENT)
Dept: FAMILY MEDICINE CLINIC | Facility: MEDICAL CENTER | Age: 65
End: 2025-05-29
Payer: COMMERCIAL

## 2025-05-29 VITALS
SYSTOLIC BLOOD PRESSURE: 100 MMHG | RESPIRATION RATE: 20 BRPM | WEIGHT: 144.6 LBS | HEART RATE: 55 BPM | OXYGEN SATURATION: 99 % | BODY MASS INDEX: 26.61 KG/M2 | HEIGHT: 62 IN | TEMPERATURE: 98.1 F | DIASTOLIC BLOOD PRESSURE: 74 MMHG

## 2025-05-29 DIAGNOSIS — Z00.00 ANNUAL PHYSICAL EXAM: Primary | ICD-10-CM

## 2025-05-29 DIAGNOSIS — E78.5 HYPERLIPIDEMIA, UNSPECIFIED HYPERLIPIDEMIA TYPE: ICD-10-CM

## 2025-05-29 DIAGNOSIS — F40.243 FEAR OF FLYING: ICD-10-CM

## 2025-05-29 PROCEDURE — 99396 PREV VISIT EST AGE 40-64: CPT | Performed by: STUDENT IN AN ORGANIZED HEALTH CARE EDUCATION/TRAINING PROGRAM

## 2025-05-29 PROCEDURE — 99214 OFFICE O/P EST MOD 30 MIN: CPT | Performed by: STUDENT IN AN ORGANIZED HEALTH CARE EDUCATION/TRAINING PROGRAM

## 2025-05-29 RX ORDER — ALPRAZOLAM 0.25 MG
0.25 TABLET ORAL AS NEEDED
Qty: 2 TABLET | Refills: 0 | Status: SHIPPED | OUTPATIENT
Start: 2025-05-29

## 2025-05-29 NOTE — ASSESSMENT & PLAN NOTE
I have reviewed pertinent labs:  Lipid Profile:   Lab Results   Component Value Date    CHOLESTEROL 225 (H) 05/23/2025    HDL 67 05/23/2025    TRIG 97 05/23/2025    LDLCALC 139 (H) 05/23/2025     The 10-year ASCVD risk score (Tico YODER, et al., 2019) is: 3%    Values used to calculate the score:      Age: 64 years      Clincally relevant sex: Female      Is Non- : No      Diabetic: No      Tobacco smoker: No      Systolic Blood Pressure: 100 mmHg      Is BP treated: No      HDL Cholesterol: 67 mg/dL      Total Cholesterol: 225 mg/dL

## 2025-05-29 NOTE — PROGRESS NOTES
Adult Annual Physical  Name: Shell Dey      : 1960      MRN: [id number]  Encounter Provider: Naty Holbrook DO  Encounter Date: 2025   Encounter department: [facility]    :  Assessment & Plan  Annual physical exam         Fear of flying    Orders:    ALPRAZolam (XANAX) 0.25 mg tablet; Take 1 tablet (0.25 mg total) by mouth if needed for anxiety (Take 30 minutes to 1 hour prior to flight)    Hyperlipidemia, unspecified hyperlipidemia type    I have reviewed pertinent labs:  Lipid Profile:   Lab Results   Component Value Date    CHOLESTEROL 225 (H) 2025    HDL 67 2025    TRIG 97 2025    LDLCALC 139 (H) 2025     The 10-year ASCVD risk score (Tico YODER, et al., 2019) is: 3%    Values used to calculate the score:      Age: 64 years      Clincally relevant sex: Female      Is Non- : No      Diabetic: No      Tobacco smoker: No      Systolic Blood Pressure: 100 mmHg      Is BP treated: No      HDL Cholesterol: 67 mg/dL      Total Cholesterol: 225 mg/dL                 Preventive Screenings:  - Diabetes Screening: screening up-to-date  - Cholesterol Screening: has hyperlipidemia and screening up-to-date   - Hepatitis C screening: screening up-to-date   - HIV screening: screening up-to-date   - Cervical cancer screening: screening up-to-date   - Breast cancer screening: screening up-to-date   - Colon cancer screening: screening up-to-date   - Lung cancer screening: risks/benefits discussed and orders placed     Immunizations:  - Immunizations due: Zoster (Shingrix)  - Risks/benefits immunizations discussed      Counseling/Anticipatory Guidance:  - Alcohol: discussed moderation in alcohol intake and recommendations for healthy alcohol use.   - Dental health: discussed importance of regular tooth brushing, flossing, and dental visits.   - Diet: discussed recommendations for a healthy/well-balanced diet.   - Exercise: the importance of  "regular exercise/physical activity was discussed. Recommend exercise 3-5 times per week for at least 30 minutes.   - Injury prevention: discussed safety/seat belts, safety helmets, smoke detectors, carbon monoxide detectors, and smoking near bedding or upholstery.     Counseled about sunscreen use and sun protection        Depression Screening and Follow-up Plan: Patient was screened for depression during today's encounter. They screened negative with a PHQ-2 score of 0.          History of Present Illness     Patient will be traveling soon and requests refill of Xanax for fear flying.    Adult Annual Physical:  Patient presents for annual physical.     Diet and Physical Activity:  - Diet/Nutrition: well balanced diet.  - Exercise: walking, strength training exercises, 5-7 times a week on average and 1-2 hours on average.    Depression Screening:  - PHQ-2 Score: 0    General Health:  - Sleep: sleeps poorly, 4-6 hours of sleep on average and unrefreshing sleep. \"off and on, sometimes can sleep 12 hours straight\"  - Hearing: requires use of hearing aids. Deaf right ear  - Vision: most recent eye exam < 1 year ago and wears glasses and contacts.  - Dental: regular dental visits, brushes teeth once daily and does not floss.    /GYN Health:  - Follows with GYN: yes.   - Menopause: postmenopausal.   - History of STDs: no  - Contraception: menopause.      Advanced Care Planning:  - Has an advanced directive?: no    - Has a durable medical POA?: no      Review of Systems    As noted in HPI   Medical History Reviewed by provider this encounter:  Tobacco  Allergies  Meds  Problems  Med Hx  Surg Hx  Fam Hx     .  Past Medical History   Past Medical History[1]  Past Surgical History[2]  Family History[3]   reports that she has quit smoking. Her smoking use included cigarettes. She has a 2.5 pack-year smoking history. She has never used smokeless tobacco. She reports current alcohol use of about 4.0 standard drinks of " "alcohol per week. She reports that she does not use drugs.  Current Outpatient Medications   Medication Instructions    ALPRAZolam (XANAX) 0.25 mg, Oral, As needed    atenolol (TENORMIN) 25 mg, Oral, Daily    B Complex Vitamins (B COMPLEX 1 PO) Take by mouth    cetirizine (ZYRTEC) 10 mg, Daily    citalopram (CELEXA) 10 mg, Oral, Daily    DOXYCYCLINE 50 mg, Daily    Linzess 72 mcg, Oral, Daily    ofloxacin (OCUFLOX) 0.3 % ophthalmic solution 2 drops, Right Eye, 4 times daily    Omega-3 Fish Oil 600 mg, Daily    omeprazole (PRILOSEC) 40 mg, Oral, Daily    Restasis 0.05 % ophthalmic emulsion 1 drop, Every 12 hours    sucralfate (CARAFATE) 1 g, Oral, 4 times daily   Allergies[4]   Medications Ordered Prior to Encounter[5]   Social History[6]    Objective   /74 (Patient Position: Sitting, Cuff Size: Standard)   Pulse 55   Temp 98.1 °F (36.7 °C) (Temporal)   Resp 20   Ht 5' 2\" (1.575 m)   Wt 65.6 kg (144 lb 9.6 oz)   SpO2 99%   BMI 26.45 kg/m²     Physical Exam  Vitals reviewed.   Constitutional:       General: She is not in acute distress.     Appearance: Normal appearance.   HENT:      Head: Normocephalic and atraumatic.      Right Ear: External ear normal.      Left Ear: External ear normal.      Nose: Nose normal.      Mouth/Throat:      Mouth: Mucous membranes are moist.      Pharynx: Oropharynx is clear.     Eyes:      Extraocular Movements: Extraocular movements intact.      Conjunctiva/sclera: Conjunctivae normal.      Pupils: Pupils are equal, round, and reactive to light.       Cardiovascular:      Rate and Rhythm: Normal rate and regular rhythm.      Pulses: Normal pulses.      Heart sounds: Normal heart sounds.   Pulmonary:      Effort: Pulmonary effort is normal.      Breath sounds: Normal breath sounds.   Abdominal:      General: Abdomen is flat. Bowel sounds are normal.      Palpations: Abdomen is soft.      Tenderness: There is no abdominal tenderness.     Musculoskeletal:      Cervical back: " Neck supple.      Right lower leg: No edema.      Left lower leg: No edema.   Lymphadenopathy:      Cervical: No cervical adenopathy.     Skin:     General: Skin is warm and dry.     Neurological:      Mental Status: She is alert and oriented to person, place, and time.     Psychiatric:         Mood and Affect: Mood normal.         Behavior: Behavior normal.         Thought Content: Thought content normal.         Judgment: Judgment normal.              [1]   Past Medical History:  Diagnosis Date    Abnormal ECG 04/2009    Anxiety     Arthritis     Breast cyst     Fibrocystic breast     Fibroids     GERD (gastroesophageal reflux disease) 2009    Heart disease     Heart murmur 1988    Heart valve disease     HL (hearing loss)     Deaf right ear    Mitral valve prolapse 1988    Shingles     Skin tag     Varicella    [2]   Past Surgical History:  Procedure Laterality Date    BREAST CYST ASPIRATION      CARDIAC CATHETERIZATION      COLONOSCOPY  2022    DILATION AND EVACUATION      TONSILECTOMY AND ADNOIDECTOMY      TUBAL LIGATION  1991    UPPER GASTROINTESTINAL ENDOSCOPY  2024   [3]   Family History  Problem Relation Name Age of Onset    Breast cancer Mother Richa 80    Cancer Mother Richa     Hearing loss Mother Richa     Diabetes Mother Richa     Colon cancer Father Uche     Coronary artery disease Father Uche     COPD Father Uche     Cancer Father Uche     Colon polyps Father Uche     Liver disease Father Uche         [place]    BRCA1 Negative Sister Latonia     BRCA2 Negative Sister Latonia     Diabetes Sister Latonia     Squamous cell carcinoma Sister Latonia     No Known Problems Sister      No Known Problems Sister      No Known Problems Son      Breast cancer Maternal Grandmother Merry 30    Breast cancer Maternal Grandfather Popop     Breast cancer Paternal Grandmother Liliya     Breast cancer Paternal Grandfather Isidoro     Stroke Paternal Grandfather Isidoro     Stomach cancer Maternal Aunt Ana      Cancer Maternal Aunt Ana         Ovarian    Breast cancer Family      Colon cancer Family      Ovarian cancer Neg Hx      Uterine cancer Neg Hx      Cervical cancer Neg Hx     [4] No Known Allergies  [5]   Current Outpatient Medications on File Prior to Visit   Medication Sig Dispense Refill    atenolol (TENORMIN) 25 mg tablet TAKE 1 TABLET (25 MG TOTAL) BY MOUTH DAILY. 90 tablet 1    B Complex Vitamins (B COMPLEX 1 PO) Take by mouth      cetirizine (ZyrTEC) 10 mg tablet Take 10 mg by mouth in the morning.      citalopram (CeleXA) 10 mg tablet TAKE 1 TABLET BY MOUTH EVERY DAY 90 tablet 1    DOXYCYCLINE Take 50 mg by mouth daily      Omega-3 Fatty Acids (Omega-3 Fish Oil) 500 MG CAPS Take 600 mg by mouth in the morning      omeprazole (PriLOSEC) 40 MG capsule Take 1 capsule (40 mg total) by mouth daily 90 capsule 0    Restasis 0.05 % ophthalmic emulsion Administer 1 drop to both eyes every 12 (twelve) hours      [DISCONTINUED] ALPRAZolam (XANAX) 0.25 mg tablet Take 1 tablet (0.25 mg total) by mouth if needed for anxiety (Take 30 minutes to 1 hour prior to flight) 6 tablet 0    linaCLOtide (Linzess) 72 MCG CAPS Take 72 mcg by mouth daily (Patient not taking: Reported on 4/10/2025) 31 capsule 3    ofloxacin (OCUFLOX) 0.3 % ophthalmic solution Administer 2 drops to the right eye 4 (four) times a day 10 mL 0    sucralfate (CARAFATE) 1 g tablet TAKE 1 TABLET BY MOUTH FOUR TIMES A  tablet 1     No current facility-administered medications on file prior to visit.   [6]   Social History  Tobacco Use    Smoking status: Former     Current packs/day: 0.25     Average packs/day: 0.3 packs/day for 10.0 years (2.5 ttl pk-yrs)     Types: Cigarettes    Smokeless tobacco: Never   Vaping Use    Vaping status: Never Used   Substance and Sexual Activity    Alcohol use: Yes     Alcohol/week: 4.0 standard drinks of alcohol     Types: 2 Glasses of wine, 2 Cans of beer per week     Comment: socially     Drug use: Never    Sexual  activity: Yes     Partners: Male     Birth control/protection: Post-menopausal

## 2025-05-29 NOTE — ASSESSMENT & PLAN NOTE
Orders:    ALPRAZolam (XANAX) 0.25 mg tablet; Take 1 tablet (0.25 mg total) by mouth if needed for anxiety (Take 30 minutes to 1 hour prior to flight)

## 2025-07-11 ENCOUNTER — APPOINTMENT (OUTPATIENT)
Dept: RADIOLOGY | Facility: AMBULARY SURGERY CENTER | Age: 65
End: 2025-07-11
Attending: STUDENT IN AN ORGANIZED HEALTH CARE EDUCATION/TRAINING PROGRAM
Payer: COMMERCIAL

## 2025-07-11 ENCOUNTER — OFFICE VISIT (OUTPATIENT)
Dept: OBGYN CLINIC | Facility: CLINIC | Age: 65
End: 2025-07-11
Payer: COMMERCIAL

## 2025-07-11 DIAGNOSIS — M25.561 RIGHT KNEE PAIN, UNSPECIFIED CHRONICITY: ICD-10-CM

## 2025-07-11 DIAGNOSIS — M25.561 RIGHT KNEE PAIN, UNSPECIFIED CHRONICITY: Primary | ICD-10-CM

## 2025-07-11 PROCEDURE — 73564 X-RAY EXAM KNEE 4 OR MORE: CPT

## 2025-07-11 PROCEDURE — 20610 DRAIN/INJ JOINT/BURSA W/O US: CPT

## 2025-07-11 PROCEDURE — 99204 OFFICE O/P NEW MOD 45 MIN: CPT | Performed by: STUDENT IN AN ORGANIZED HEALTH CARE EDUCATION/TRAINING PROGRAM

## 2025-07-11 RX ORDER — NAPROXEN 500 MG/1
500 TABLET ORAL 2 TIMES DAILY WITH MEALS
Qty: 60 TABLET | Refills: 0 | Status: SHIPPED | OUTPATIENT
Start: 2025-07-11 | End: 2025-08-10

## 2025-07-11 RX ORDER — BUPIVACAINE HYDROCHLORIDE 2.5 MG/ML
4 INJECTION, SOLUTION INFILTRATION; PERINEURAL
Status: COMPLETED | OUTPATIENT
Start: 2025-07-11 | End: 2025-07-11

## 2025-07-11 RX ORDER — TRIAMCINOLONE ACETONIDE 40 MG/ML
40 INJECTION, SUSPENSION INTRA-ARTICULAR; INTRAMUSCULAR
Status: COMPLETED | OUTPATIENT
Start: 2025-07-11 | End: 2025-07-11

## 2025-07-11 RX ORDER — NAPROXEN 500 MG/1
500 TABLET ORAL 2 TIMES DAILY WITH MEALS
Qty: 60 TABLET | Refills: 0 | Status: SHIPPED | OUTPATIENT
Start: 2025-07-11 | End: 2025-07-11

## 2025-07-11 RX ADMIN — BUPIVACAINE HYDROCHLORIDE 4 ML: 2.5 INJECTION, SOLUTION INFILTRATION; PERINEURAL at 12:15

## 2025-07-11 RX ADMIN — TRIAMCINOLONE ACETONIDE 40 MG: 40 INJECTION, SUSPENSION INTRA-ARTICULAR; INTRAMUSCULAR at 12:15

## 2025-07-11 NOTE — PROGRESS NOTES
"[facility] [place]  [address]  [address]  [place] [id number] [phone]       Shell Dey  [id number]  1960    ORTHOPAEDIC SURGERY OUTPATIENT NOTE  7/11/2025      :  Assessment & Plan  Right knee pain, unspecified chronicity  X-ray of the right knee was obtained and reviewed with patient and her   Discussed that had moderate tricompartmental osteoarthritis worse in the medial compartment with likely medial degenerative tear  Recommended corticosteroid injection as patient is leaving for [place]  A right knee corticosteroid injection was given without complication and was well tolerated  Discussed that injection can be repeated every 3 months   We also discussed visco gel injections as an option if corticosteroid injections do not allow relief, if not improved will discuss possible MRI  Prescription for naproxen sent to patients pharmacy, to take with food  Follow up if pain continues upon her return from her Vacation     Orders:    XR knee 4+ vw right injury; Future    Ambulatory Referral to Physical Therapy; Future    naproxen (Naprosyn) 500 mg tablet; Take 1 tablet (500 mg total) by mouth 2 (two) times a day with meals          Large joint arthrocentesis: R knee    Performed by: Swati Medellin PA-C  Authorized by: Paco Darby DO    Universal Protocol:  Consent: Verbal consent obtained. Written consent not obtained  Risks and benefits: risks, benefits and alternatives were discussed  Consent given by: patient  Time out: Immediately prior to procedure a \"time out\" was called to verify the correct patient, procedure, equipment, support staff and site/side marked as required.  Timeout called at: 7/11/2025 12:41 PM.  Patient understanding: patient states understanding of the procedure being performed  Relevant documents: relevant documents present and verified  Test results: test results available and properly labeled  Site " "marked: the operative site was marked  Radiology Images displayed and confirmed. If images not available, report reviewed: imaging studies available  Patient identity confirmed: verbally with patient, provided demographic data and hospital-assigned identification number  Supporting Documentation  Indications: pain and joint swelling     Is this a Visco injection? NoProcedure Details  Location: knee - R knee  Preparation: Patient was prepped and draped in the usual sterile fashion  Needle size: 18 G  Ultrasound guidance: no  Approach: anterolateral  Medications administered: 4 mL bupivacaine 0.25 %; 40 mg triamcinolone acetonide 40 mg/mL    Patient tolerance: patient tolerated the procedure well with no immediate complications  Dressing:  Sterile dressing applied            Translation: No    HISTORY:  64 y.o. female  who is present in office for evaluation of her right knee pain.  She is retired.  She notes that her pain started Tuesday, without injury or trauma and no new exercise routine.  She notes that she is very active and always walks 5 miles a day.  She notes that she was walking and had knee pain and swelling that is worse at the medial joint line.  She notes that the following day she went and is unable to walk due to the pain and swelling.  She notes that her pain was a 10 out of 10 yesterday, however today rates her pain to be an 8 out of 10 at the medial joint line characterized to be dull achy and then with ambulation is sharp.  Denies any previous surgeries and denies any previous corticosteroid injections of the knee    Surgical History:  None    Previous Injection(s): none      The following portions of the patient's history were reviewed and updated as appropriate: allergies, current medications, past family history, past social history, past surgical history and problem list.    There were no vitals taken for this visit.   No results found for: \"HGBA1C\"      Past Medical History[1]    Past " Surgical History[2]    Social History     Socioeconomic History    Marital status: /Civil Union     Spouse name: Not on file    Number of children: Not on file    Years of education: Not on file    Highest education level: Not on file   Occupational History    Not on file   Tobacco Use    Smoking status: Former     Current packs/day: 0.25     Average packs/day: 0.3 packs/day for 10.0 years (2.5 ttl pk-yrs)     Types: Cigarettes    Smokeless tobacco: Never   Vaping Use    Vaping status: Never Used   Substance and Sexual Activity    Alcohol use: Yes     Alcohol/week: 4.0 standard drinks of alcohol     Types: 2 Glasses of wine, 2 Cans of beer per week     Comment: socially     Drug use: Never    Sexual activity: Yes     Partners: Male     Birth control/protection: Post-menopausal   Other Topics Concern    Not on file   Social History Narrative    Not on file     Social Drivers of Health     Financial Resource Strain: Low Risk  (8/22/2024)    Received from [facility]    Overall Financial Resource Strain (CARDIA)     Difficulty of Paying Living Expenses: Not very hard   Food Insecurity: No Food Insecurity (5/27/2025)    Nursing - Inadequate Food Risk Classification     Worried About Running Out of Food in the Last Year: Never true     Ran Out of Food in the Last Year: Never true     Ran Out of Food in the Last Year: Never true   Transportation Needs: No Transportation Needs (5/27/2025)    PRAPARE - Transportation     Lack of Transportation (Medical): No     Lack of Transportation (Non-Medical): No   Physical Activity: Not on file   Stress: Not on file   Social Connections: Not on file   Intimate Partner Violence: Unknown (3/30/2025)    Nursing IPS     Feels Physically and Emotionally Safe: Not on file     Physically Hurt by Someone: Not on file     Humiliated or Emotionally Abused by Someone: Not on file     Physically Hurt by Someone: No     Hurt or Threatened by Someone: No   Housing Stability:  Low Risk  (5/27/2025)    Housing Stability Vital Sign     Unable to Pay for Housing in the Last Year: No     Number of Times Moved in the Last Year: 0     Homeless in the Last Year: No       Family History[3]     Patient's Medications   New Prescriptions    NAPROXEN (NAPROSYN) 500 MG TABLET    Take 1 tablet (500 mg total) by mouth 2 (two) times a day with meals   Previous Medications    ALPRAZOLAM (XANAX) 0.25 MG TABLET    Take 1 tablet (0.25 mg total) by mouth if needed for anxiety (Take 30 minutes to 1 hour prior to flight)    ATENOLOL (TENORMIN) 25 MG TABLET    TAKE 1 TABLET (25 MG TOTAL) BY MOUTH DAILY.    B COMPLEX VITAMINS (B COMPLEX 1 PO)    Take by mouth    CETIRIZINE (ZYRTEC) 10 MG TABLET    Take 10 mg by mouth in the morning.    CITALOPRAM (CELEXA) 10 MG TABLET    TAKE 1 TABLET BY MOUTH EVERY DAY    DOXYCYCLINE    Take 50 mg by mouth daily    LINACLOTIDE (LINZESS) 72 MCG CAPS    Take 72 mcg by mouth daily    OFLOXACIN (OCUFLOX) 0.3 % OPHTHALMIC SOLUTION    Administer 2 drops to the right eye 4 (four) times a day    OMEGA-3 FATTY ACIDS (OMEGA-3 FISH OIL) 500 MG CAPS    Take 600 mg by mouth in the morning    OMEPRAZOLE (PRILOSEC) 40 MG CAPSULE    Take 1 capsule (40 mg total) by mouth daily    RESTASIS 0.05 % OPHTHALMIC EMULSION    Administer 1 drop to both eyes every 12 (twelve) hours    SUCRALFATE (CARAFATE) 1 G TABLET    TAKE 1 TABLET BY MOUTH FOUR TIMES A DAY   Modified Medications    No medications on file   Discontinued Medications    No medications on file       Allergies[4]       REVIEW OF SYSTEMS:  Constitutional: Negative.    HEENT: Negative.    Respiratory: Negative.    Skin: Negative.    Neurological: Negative.    Psychiatric/Behavioral: Negative.  Musculoskeletal: Negative except for that mentioned in the HPI.    Gen: No acute distress, resting comfortably in bed  HEENT: Eyes clear, moist mucus membranes, hearing intact  Respiratory: No audible wheezing or stridor  Cardiovascular: Well Perfused  "peripherally, 2+ distal pulse  Abdomen: nondistended, no peritoneal signs     PHYSICAL EXAM:      Right Knee Exam  Alignment:  Normal knee alignment.  Inspection:  minimal swelling. No edema. No erythema. No ecchymosis.  Palpation:  + TTP medial joint line, - TTP lateral joint line  ROM:  0-120  Strength:  Not tested.  Stability:  (-) Varus instability. (-) Valgus instability. (-) Lachman. (-) Posterior drawer.  Tests:  (+) Art.  Patella:  (-) J-sign. (-) Patellar apprehension. (-) Patellar tilt.  Neurovascular:  Sensation intact in DP/SP/Hawkins/Sa/T nerve distributions.  2+ DP & PT pulses.  Gait:  Normal.     IMAGING:  XR of right knee - shows mild to moderate tricompartmental osteoarthritis that is moderate in medial compartment, no acute osseous abnormality. I do not currently have a radiology reading from [facility], but will check the result once the reading is performed.          Swati Medellin PA-C    Scribe Attestation      I,:  Swati Medellin PA-C am acting as a scribe while in the presence of the attending physician.:       I,:  Paco Darby, DO personally performed the services described in this documentation    as scribed in my presence.:               Portions of the record may have been created with voice recognition software.  Occasional wrong word or \"sound a like\" substitutions may have occurred due to the inherent limitations of voice recognition software.  Read the chart carefully and recognize, using context, where substitutions have occurred. All patient's questions were answered to their satisfaction.           [1]   Past Medical History:  Diagnosis Date    Abnormal ECG 04/2009    Anxiety     Arthritis     Breast cyst     Fibrocystic breast     Fibroids     GERD (gastroesophageal reflux disease) 2009    Heart disease     Heart murmur 1988    Heart valve disease     HL (hearing loss)     Deaf right ear    Mitral valve prolapse 1988    Shingles     Skin tag     Varicella    [2]   Past Surgical " History:  Procedure Laterality Date    BREAST CYST ASPIRATION      CARDIAC CATHETERIZATION      COLONOSCOPY  2022    DILATION AND EVACUATION      TONSILECTOMY AND ADNOIDECTOMY      TUBAL LIGATION  1991    UPPER GASTROINTESTINAL ENDOSCOPY  2024   [3]   Family History  Problem Relation Name Age of Onset    Breast cancer Mother Richa 80    Cancer Mother Richa     Hearing loss Mother Richa     Diabetes Mother Richa     Colon cancer Father Uche     Coronary artery disease Father Uche     COPD Father Uche     Cancer Father Uche     Colon polyps Father Uche     Liver disease Father Uche         [place]    BRCA1 Negative Sister Latonia     BRCA2 Negative Sister Latonia     Diabetes Sister Latonia     Squamous cell carcinoma Sister Latonia     No Known Problems Sister      No Known Problems Sister      No Known Problems Son      Breast cancer Maternal Grandmother Merry 30    Breast cancer Maternal Grandfather Popop     Breast cancer Paternal Grandmother Liliya     Breast cancer Paternal Grandfather Isidoro     Stroke Paternal Grandfather Isidoro     Stomach cancer Maternal Aunt Ana     Cancer Maternal Aunt Ana         Ovarian    Breast cancer Family      Colon cancer Family      Ovarian cancer Neg Hx      Uterine cancer Neg Hx      Cervical cancer Neg Hx     [4] No Known Allergies

## 2025-08-06 ENCOUNTER — TELEPHONE (OUTPATIENT)
Age: 65
End: 2025-08-06

## 2025-08-08 ENCOUNTER — OFFICE VISIT (OUTPATIENT)
Dept: GASTROENTEROLOGY | Facility: CLINIC | Age: 65
End: 2025-08-08
Payer: COMMERCIAL

## 2025-08-08 VITALS
DIASTOLIC BLOOD PRESSURE: 72 MMHG | TEMPERATURE: 97.6 F | WEIGHT: 135 LBS | BODY MASS INDEX: 24.84 KG/M2 | HEIGHT: 62 IN | HEART RATE: 53 BPM | SYSTOLIC BLOOD PRESSURE: 132 MMHG | OXYGEN SATURATION: 99 %

## 2025-08-08 DIAGNOSIS — K58.1 IRRITABLE BOWEL SYNDROME WITH CONSTIPATION: ICD-10-CM

## 2025-08-08 DIAGNOSIS — K21.9 GASTROESOPHAGEAL REFLUX DISEASE WITHOUT ESOPHAGITIS: Primary | ICD-10-CM

## 2025-08-08 PROCEDURE — 99214 OFFICE O/P EST MOD 30 MIN: CPT | Performed by: INTERNAL MEDICINE
